# Patient Record
Sex: MALE | Race: WHITE | NOT HISPANIC OR LATINO | Employment: OTHER | URBAN - METROPOLITAN AREA
[De-identification: names, ages, dates, MRNs, and addresses within clinical notes are randomized per-mention and may not be internally consistent; named-entity substitution may affect disease eponyms.]

---

## 2017-08-25 ENCOUNTER — OFFICE VISIT (OUTPATIENT)
Dept: INTERNAL MEDICINE | Facility: CLINIC | Age: 57
End: 2017-08-25
Payer: COMMERCIAL

## 2017-08-25 VITALS
DIASTOLIC BLOOD PRESSURE: 70 MMHG | WEIGHT: 188.94 LBS | BODY MASS INDEX: 25.04 KG/M2 | HEART RATE: 69 BPM | SYSTOLIC BLOOD PRESSURE: 128 MMHG | HEIGHT: 73 IN

## 2017-08-25 DIAGNOSIS — K40.90 INGUINAL HERNIA OF RIGHT SIDE WITHOUT OBSTRUCTION OR GANGRENE: Primary | ICD-10-CM

## 2017-08-25 PROCEDURE — 3008F BODY MASS INDEX DOCD: CPT | Mod: S$GLB,,, | Performed by: INTERNAL MEDICINE

## 2017-08-25 PROCEDURE — 99202 OFFICE O/P NEW SF 15 MIN: CPT | Mod: S$GLB,,, | Performed by: INTERNAL MEDICINE

## 2017-08-25 PROCEDURE — 99999 PR PBB SHADOW E&M-EST. PATIENT-LVL III: CPT | Mod: PBBFAC,,, | Performed by: INTERNAL MEDICINE

## 2017-08-25 NOTE — PROGRESS NOTES
REASON FOR VISIT:  This is a 57-year-old male who has noticed a bulge in his   right suprapubic region.  It is a little bit tender, particularly when he is   moving something.  He started noticing the pain about a week ago when sneezing   and then two days ago, he started noticing the bulge; he is able to push it in.    There is no abdominal pain or back pain.  No problems with bowel or urine   function.    PAST MEDICAL HISTORY:  No major health conditions.    MEDICATIONS:  None.    PHYSICAL EXAMINATION:  VITAL SIGNS:  Weight 188 pounds, blood pressure 128/70, pulse 72.  LUNGS:  Clear.  HEART:  Regular rate and rhythm.  ABDOMEN:  Active bowel sounds, soft, nontender.  GROINS:  There is a noticeable bulge in the right suprapubic region that is   reducible.  On standing up, the bulge is again noticed and I can feel it within   the inguinal canal.  No testicular abnormalities.    IMPRESSION:  Acute right inguinal hernia.    PLAN:  Refer to General Surgery.  Proper fluid intake discussed.    /sc [id number] review      JAM/ALEISHA  dd: 08/25/2017 11:40:43 (CDT)  td: 08/26/2017 01:26:53 (CDT)  Doc ID   #[id number]  Job ID #[id number]    CC:

## 2017-08-28 DIAGNOSIS — Z12.11 COLON CANCER SCREENING: ICD-10-CM

## 2017-08-29 ENCOUNTER — OFFICE VISIT (OUTPATIENT)
Dept: SURGERY | Facility: CLINIC | Age: 57
End: 2017-08-29
Payer: COMMERCIAL

## 2017-08-29 ENCOUNTER — LAB VISIT (OUTPATIENT)
Dept: LAB | Facility: HOSPITAL | Age: 57
End: 2017-08-29
Attending: SURGERY
Payer: COMMERCIAL

## 2017-08-29 ENCOUNTER — HOSPITAL ENCOUNTER (OUTPATIENT)
Dept: CARDIOLOGY | Facility: CLINIC | Age: 57
Discharge: HOME | End: 2017-08-29
Attending: SURGERY
Payer: COMMERCIAL

## 2017-08-29 VITALS
HEIGHT: 73 IN | WEIGHT: 188 LBS | BODY MASS INDEX: 24.92 KG/M2 | DIASTOLIC BLOOD PRESSURE: 78 MMHG | TEMPERATURE: 98 F | HEART RATE: 61 BPM | SYSTOLIC BLOOD PRESSURE: 126 MMHG

## 2017-08-29 DIAGNOSIS — K40.90 RIGHT INGUINAL HERNIA: ICD-10-CM

## 2017-08-29 DIAGNOSIS — K40.90 RIGHT INGUINAL HERNIA: Primary | ICD-10-CM

## 2017-08-29 LAB
ANION GAP SERPL CALC-SCNC: 5 MMOL/L
BASOPHILS # BLD AUTO: 0.03 K/UL
BASOPHILS NFR BLD: 0.5 %
BUN SERPL-MCNC: 18 MG/DL
CALCIUM SERPL-MCNC: 9.6 MG/DL
CHLORIDE SERPL-SCNC: 102 MMOL/L
CO2 SERPL-SCNC: 30 MMOL/L
CREAT SERPL-MCNC: 0.9 MG/DL
DIFFERENTIAL METHOD: NORMAL
EOSINOPHIL # BLD AUTO: 0.1 K/UL
EOSINOPHIL NFR BLD: 1.7 %
ERYTHROCYTE [DISTWIDTH] IN BLOOD BY AUTOMATED COUNT: 13.2 %
EST. GFR  (AFRICAN AMERICAN): >60 ML/MIN/1.73 M^2
EST. GFR  (NON AFRICAN AMERICAN): >60 ML/MIN/1.73 M^2
GLUCOSE SERPL-MCNC: 98 MG/DL
HCT VFR BLD AUTO: 46.8 %
HGB BLD-MCNC: 16 G/DL
LYMPHOCYTES # BLD AUTO: 2.1 K/UL
LYMPHOCYTES NFR BLD: 31.4 %
MCH RBC QN AUTO: 30.3 PG
MCHC RBC AUTO-ENTMCNC: 34.2 G/DL
MCV RBC AUTO: 89 FL
MONOCYTES # BLD AUTO: 0.4 K/UL
MONOCYTES NFR BLD: 6.7 %
NEUTROPHILS # BLD AUTO: 3.9 K/UL
NEUTROPHILS NFR BLD: 59.4 %
PLATELET # BLD AUTO: 206 K/UL
PMV BLD AUTO: 9.3 FL
POTASSIUM SERPL-SCNC: 4.6 MMOL/L
RBC # BLD AUTO: 5.28 M/UL
SODIUM SERPL-SCNC: 137 MMOL/L
WBC # BLD AUTO: 6.56 K/UL

## 2017-08-29 PROCEDURE — 80048 BASIC METABOLIC PNL TOTAL CA: CPT

## 2017-08-29 PROCEDURE — 99999 PR PBB SHADOW E&M-EST. PATIENT-LVL IV: CPT | Mod: PBBFAC,,, | Performed by: SURGERY

## 2017-08-29 PROCEDURE — 36415 COLL VENOUS BLD VENIPUNCTURE: CPT

## 2017-08-29 PROCEDURE — 93000 ELECTROCARDIOGRAM COMPLETE: CPT | Mod: S$GLB,,, | Performed by: INTERNAL MEDICINE

## 2017-08-29 PROCEDURE — 85025 COMPLETE CBC W/AUTO DIFF WBC: CPT

## 2017-08-29 PROCEDURE — 99243 OFF/OP CNSLTJ NEW/EST LOW 30: CPT | Mod: S$GLB,,, | Performed by: SURGERY

## 2017-08-29 RX ORDER — SODIUM CHLORIDE 9 MG/ML
INJECTION, SOLUTION INTRAVENOUS CONTINUOUS
Status: CANCELLED | OUTPATIENT
Start: 2017-08-29

## 2017-08-29 NOTE — PROGRESS NOTES
"History & Physical    SUBJECTIVE:     History of Present Illness:  Patient is a 57 y.o. male referred to the clinic for a bulge in his supra pubic region. He states that one week ago he has coughing and noticed some discomfort and pain in his groin/ suprapubic region radiating up into his abdomen. Two days later he was lifting weights in the gym and he noticed a bulge in the suprapubic area. He was able to reduce the bulge. He has relieved the discomfort and mass some by wearing compression shorts. He is a very active man who enjoys heavy lifting weights and frequent cardio and the discomfort and mass is very bothersome to him.       Chief Complaint   Patient presents with    Consult       Review of patient's allergies indicates:   Allergen Reactions    Peanut        No current outpatient prescriptions on file.     No current facility-administered medications for this visit.        No past medical history on file.  Past Surgical History:   Procedure Laterality Date    EYE SURGERY       Family History   Problem Relation Age of Onset    Heart disease Mother     Stroke Mother      Social History   Substance Use Topics    Smoking status: Never Smoker    Smokeless tobacco: Not on file    Alcohol use 0.0 oz/week      Comment: limited        Review of Systems:  Review of Systems   Respiratory: Negative for cough, chest tightness and shortness of breath.    Cardiovascular: Negative for chest pain.   Gastrointestinal: Negative for abdominal pain, nausea and vomiting.   Genitourinary: Negative for testicular pain.       OBJECTIVE:     Vital Signs (Most Recent)  Temp: 97.6 °F (36.4 °C) (08/29/17 0943)  Pulse: 61 (08/29/17 0943)  BP: 126/78 (08/29/17 0943)  6' 1" (1.854 m)  85.3 kg (188 lb)     Physical Exam:  Physical Exam   Constitutional: He appears well-developed and well-nourished. No distress.   HENT:   Head: Normocephalic and atraumatic.   Eyes: EOM are normal. Pupils are equal, round, and reactive to light. "   Cardiovascular: Normal rate and regular rhythm.    Pulmonary/Chest: Breath sounds normal.   Abdominal: Soft. Normal appearance and bowel sounds are normal. There is no tenderness. A hernia is present. Hernia confirmed positive in the right inguinal area. Hernia confirmed negative in the left inguinal area.   Skin: He is not diaphoretic.   Nursing note and vitals reviewed.        ASSESSMENT/PLAN:     Patient is a very pleasant and healthy 57 year old male who presents with a reducible right inguinal hernia    Plan for an open right inguinal hernia repair with mesh this Friday  The hernia and surgical repair has been discussed with him in extensive detail including risk, benefits, alternative treatments and he is interested in going through with the procedure as soon as possible. . Pre procedure instructions were provided.   Consent obtained    Subhash Ivy MD PGY III  [phone]      ATTENDING ATTESTATION: Patient seen and examined. My examination confirms the resident's findings and I agree with his assessment and plan above.

## 2017-08-29 NOTE — PATIENT INSTRUCTIONS
What Is a Hernia?    A hernia is when an organ or tissue pushes through a weak area in the belly (abdominal) wall. This weak area may be present at birth. Or it may be caused by abdominal strain over time. If not treated, a hernia can get worse with time and physical stress.  When a bulge forms  When there is a weak area in the abdominal wall, an organ or tissue can push outward. This often causes a bulge that you can see under your skin. The bulge may get bigger when you stand up. It may go away when you lie down. You may also feel some pressure or mild pain when lifting, coughing, urinating, or doing other activities.  Types of hernias  The type of hernia you have depends on its location. Most hernias form in the groin at or near the internal ring. This is the entrance to a canal between the abdomen and groin. Hernias can also occur in the abdomen, thigh, or genitals.  · An incisional hernia occurs at the site of a previous surgical incision.  · An umbilical hernia occurs at the navel.  · An indirect inguinal hernia occurs in the groin at the internal ring.  · A direct inguinal hernia occurs in the groin near the internal ring.  · A femoral hernia occurs just below the groin.  · An epigastric hernia occurs in the upper abdomen at the midline.  Diagnosis  In most cases, your healthcare provider can diagnose a hernia by doing a physical exam.  In some cases it might not be clear why you have a swelling in the belly wall. Then your provider may order an imaging test such as an ultrasound. This can help with the diagnosis.  Surgery  A hernia will not heal on its own. Surgery is needed to fix the weak spot in the abdominal wall. If not treated, a hernia can get larger. It can also cause serious health problems. The good news is that hernia surgery can be done quickly and safely. In some cases, you can go home the same day as your surgery.   When to call your provider  Call your healthcare provider right away if the  swelling around your hernia becomes larger, firmer, or more painful. These may be signs that your intestines are stuck in the abdominal wall. This is an emergency. The hernia must be repaired right away to avoid serious problems.  Date Last Reviewed: 7/1/2016 © 2000-2016 [employer]. [address] [place] [address]. All rights reserved. This information is not intended as a substitute for professional medical care. Always follow your healthcare professional's instructions.        Having Hernia Surgery: Patch Repair  Surgery treats a hernia by repairing the weakness in the abdominal wall. An incision is made so the surgeon has a direct view of the hernia. The repair is then done through this incision (open surgery). To repair the defect, special mesh materials are used to patch the weak area and make a tension-free repair. Follow your healthcare providers advice on how to get ready for the procedure. You can usually go home the same day as your surgery. In some cases, though, you may need to stay in the hospital overnight.  Getting ready for surgery  Your healthcare provider will talk with you about preparing for surgery. Follow all the instructions youre given and be sure to:   · Tell your healthcare provider about any medicines, supplements, or herbs you take. This includes both prescription and over-the-counter items.  · Stop taking aspirin, ibuprofen, naproxen and other NSAIDs as directed.  · Arrange for an adult family member or friend to give you a ride home after surgery.  · Stop smoking. Smoking affects blood flow and can slow healing.  · Gently wash the surgical area the night before surgery.  · Dont eat or drink after midnight, the night before your surgery. You may need to take some medicine with sips of water on the day of surgery. Talk with your healthcare provider.     Repair in Front           Repair in Back           Combination Repair      The day of surgery  Arrive at  the hospital or surgical center at your scheduled time. Youll be asked to change into a patient gown. Youll then be given an IV to provide fluids and medicine. Shortly before surgery, an anesthesiologist or nurse anesthetist will talk with you. He or she will explain the types of anesthesia used to prevent pain during surgery. You will have one or more of the following:  · Monitored sedation to make you relaxed and sleepy.  · Local anesthesia to numb the surgical site.  · Regional anesthesia to numb specific areas of your body.  · General anesthesia to let you sleep during surgery.  During the surgery  Most hernias are treated using tension-free repairs. This is surgery that uses special mesh materials to repair the weak area. Unlike traditional repairs, the abdominal fascia (tissue around the muscle that gives strength to the abdominal wall) isnt sewn together. Instead, the mesh covers the weak area like a patch. This repairs the defect without tension on the muscles. It also makes it less likely to happen again. The mesh is made of strong, flexible plastic that stays in the body. Over time, nearby tissues grow into the mesh to strengthen the repair.  After surgery  When the procedure is over, youll be taken to the recovery area to rest. Your blood pressure and heart rate will be monitored. Youll also have a bandage over the surgical site. To help reduce discomfort, youll be given pain medicines. You may also be given breathing exercises to keep your lungs clear. Later, youll be asked to get up and walk. This helps prevent blood clots in the legs. You can go home when your healthcare provider says youre ready.     Risks and complications  Hernia surgery is safe, but does have risks including:  · Bleeding  · Infection  · Anesthesia risks  · Mesh complications  · Inability to urinate   · Bowel or bladder injury   · Numbness or pain in the groin or leg  · Risk the hernia will happen again  · Damage to the  testicles or testicular function      Date Last Reviewed: 10/1/2016  © 2833-0933 [employer]. [address] [place] [address], [place] [address]. All rights reserved. This information is not intended as a substitute for professional medical care. Always follow your healthcare professional's instructions.        After Hernia Surgery    You can usually go home the same day as surgery. If you had surgery to repair ventral or incisional hernia, you may need to stay in the hospital overnight. To speed healing, take an active role in your recovery. The tips below can help.  Reducing swelling  Early on, its common for the area around your incision to be swollen, bruised, and sore. To reduce swelling, put an ice pack or bag of frozen peas in a thin towel. Place the towel on the swollen area 3 to 5 times a day for 15 to 20 minutes at a time.  Managing pain  Take any prescribed pain medicines as directed. Be aware that some pain medicines can cause constipation. So your healthcare provider may also suggest a laxative or stool softener.  Returning to normal  You can return to your normal routine as soon as you feel able. Just take it easy and follow these guidelines:  · Take short walks to improve circulation.  · Avoid heavy lifting for at least a week.  · Ask your healthcare provider when you can drive and return to work.  · You can have sex again when you feel ready.  Follow-up care  Be sure to keep all follow-up appointments with your healthcare provider. These ensure youre healing well. During visits, your stitches, staples, or bandage may be removed.  When to call your healthcare provider   Call your healthcare provider if you have any of the following:  · A large amount of swelling or bruising (some testicular swelling and bruising is normal)  · Fever of 100.4°F (38°C) or higher, or as directed by your healthcare provider  · Pain, redness, bleeding, or fluid from the incision that gets worse  · Trouble  urinating  · Constipation  · Vomiting   Date Last Reviewed: 10/1/2016  © 5554-3424 [employer]. [address] [place] [address], [place] [address]. All rights reserved. This information is not intended as a substitute for professional medical care. Always follow your healthcare professional's instructions.

## 2017-08-29 NOTE — LETTER
August 29, 2017      Saturnino Hernandez MD  [address]  [place] [address]           [facility] - General Surgery  [address]  [facility] [address]  Phone: [phone]          Patient: Jovi Pacheco   MR Number: [id number]   YOB: 1960   Date of Visit: 8/29/2017       Dear Dr. Saturnino Hernandez:    Thank you for referring Jovi Pacheco to me for evaluation. Attached you will find relevant portions of my assessment and plan of care.    If you have questions, please do not hesitate to call me. I look forward to following Jovi Pacheco along with you.    Sincerely,    Joshua Goldberg, MD    Enclosure  CC:  No Recipients    If you would like to receive this communication electronically, please contact [email or web address] or [phone] to request more information on [employer] Link access.    For providers and/or their staff who would like to refer a patient to [facility], please contact us through our one-stop-shop provider referral line, [facility], at [phone].    If you feel you have received this communication in error or would no longer like to receive these types of communications, please e-mail [email or web address]

## 2017-08-31 ENCOUNTER — TELEPHONE (OUTPATIENT)
Dept: SURGERY | Facility: CLINIC | Age: 57
End: 2017-08-31

## 2017-09-01 ENCOUNTER — ANESTHESIA EVENT (OUTPATIENT)
Dept: SURGERY | Facility: HOSPITAL | Age: 57
End: 2017-09-01
Payer: COMMERCIAL

## 2017-09-01 ENCOUNTER — ANESTHESIA (OUTPATIENT)
Dept: SURGERY | Facility: HOSPITAL | Age: 57
End: 2017-09-01
Payer: COMMERCIAL

## 2017-09-01 ENCOUNTER — HOSPITAL ENCOUNTER (OUTPATIENT)
Facility: HOSPITAL | Age: 57
Discharge: HOME | End: 2017-09-01
Attending: SURGERY | Admitting: SURGERY
Payer: COMMERCIAL

## 2017-09-01 ENCOUNTER — SURGERY (OUTPATIENT)
Age: 57
End: 2017-09-01

## 2017-09-01 VITALS
HEIGHT: 73 IN | HEART RATE: 49 BPM | RESPIRATION RATE: 16 BRPM | TEMPERATURE: 97 F | DIASTOLIC BLOOD PRESSURE: 91 MMHG | OXYGEN SATURATION: 100 % | SYSTOLIC BLOOD PRESSURE: 135 MMHG | WEIGHT: 188 LBS | BODY MASS INDEX: 24.92 KG/M2

## 2017-09-01 DIAGNOSIS — Z98.890 S/P INGUINAL HERNIA REPAIR: Primary | ICD-10-CM

## 2017-09-01 DIAGNOSIS — K40.90 RIGHT INGUINAL HERNIA: ICD-10-CM

## 2017-09-01 DIAGNOSIS — Z87.19 S/P INGUINAL HERNIA REPAIR: Primary | ICD-10-CM

## 2017-09-01 PROCEDURE — 49505 PRP I/HERN INIT REDUC >5 YR: CPT | Mod: RT,,, | Performed by: SURGERY

## 2017-09-01 PROCEDURE — S0020 INJECTION, BUPIVICAINE HYDRO: HCPCS | Performed by: SURGERY

## 2017-09-01 PROCEDURE — 37000008 HC ANESTHESIA 1ST 15 MINUTES: Performed by: SURGERY

## 2017-09-01 PROCEDURE — 37000009 HC ANESTHESIA EA ADD 15 MINS: Performed by: SURGERY

## 2017-09-01 PROCEDURE — 71000015 HC POSTOP RECOV 1ST HR: Performed by: SURGERY

## 2017-09-01 PROCEDURE — 71000044 HC DOSC ROUTINE RECOVERY FIRST HOUR: Performed by: SURGERY

## 2017-09-01 PROCEDURE — 63600175 PHARM REV CODE 636 W HCPCS: Performed by: SURGERY

## 2017-09-01 PROCEDURE — D9220A PRA ANESTHESIA: Mod: CRNA,,, | Performed by: NURSE ANESTHETIST, CERTIFIED REGISTERED

## 2017-09-01 PROCEDURE — 71000016 HC POSTOP RECOV ADDL HR: Performed by: SURGERY

## 2017-09-01 PROCEDURE — 25000003 PHARM REV CODE 250: Performed by: SURGERY

## 2017-09-01 PROCEDURE — 36000706: Performed by: SURGERY

## 2017-09-01 PROCEDURE — 63600175 PHARM REV CODE 636 W HCPCS: Performed by: NURSE ANESTHETIST, CERTIFIED REGISTERED

## 2017-09-01 PROCEDURE — D9220A PRA ANESTHESIA: Mod: ANES,,, | Performed by: ANESTHESIOLOGY

## 2017-09-01 PROCEDURE — C1781 MESH (IMPLANTABLE): HCPCS | Performed by: SURGERY

## 2017-09-01 PROCEDURE — 36000707: Performed by: SURGERY

## 2017-09-01 DEVICE — MESH POLY KNIT PERFIX PLG LG+: Type: IMPLANTABLE DEVICE | Site: INGUINAL | Status: FUNCTIONAL

## 2017-09-01 RX ORDER — DIPHENHYDRAMINE HYDROCHLORIDE 50 MG/ML
25 INJECTION INTRAMUSCULAR; INTRAVENOUS EVERY 6 HOURS PRN
Status: DISCONTINUED | OUTPATIENT
Start: 2017-09-01 | End: 2017-09-01 | Stop reason: HOSPADM

## 2017-09-01 RX ORDER — LIDOCAINE HYDROCHLORIDE 10 MG/ML
1 INJECTION, SOLUTION EPIDURAL; INFILTRATION; INTRACAUDAL; PERINEURAL ONCE
Status: COMPLETED | OUTPATIENT
Start: 2017-09-01 | End: 2017-09-01

## 2017-09-01 RX ORDER — FENTANYL CITRATE 50 UG/ML
INJECTION, SOLUTION INTRAMUSCULAR; INTRAVENOUS
Status: DISCONTINUED | OUTPATIENT
Start: 2017-09-01 | End: 2017-09-01

## 2017-09-01 RX ORDER — LIDOCAINE HCL/PF 100 MG/5ML
SYRINGE (ML) INTRAVENOUS
Status: DISCONTINUED | OUTPATIENT
Start: 2017-09-01 | End: 2017-09-01

## 2017-09-01 RX ORDER — PROPOFOL 10 MG/ML
VIAL (ML) INTRAVENOUS CONTINUOUS PRN
Status: DISCONTINUED | OUTPATIENT
Start: 2017-09-01 | End: 2017-09-01

## 2017-09-01 RX ORDER — PROPOFOL 10 MG/ML
VIAL (ML) INTRAVENOUS
Status: DISCONTINUED | OUTPATIENT
Start: 2017-09-01 | End: 2017-09-01

## 2017-09-01 RX ORDER — BUPIVACAINE HYDROCHLORIDE 5 MG/ML
INJECTION, SOLUTION EPIDURAL; INTRACAUDAL
Status: DISCONTINUED | OUTPATIENT
Start: 2017-09-01 | End: 2017-09-01 | Stop reason: HOSPADM

## 2017-09-01 RX ORDER — MIDAZOLAM HYDROCHLORIDE 1 MG/ML
INJECTION, SOLUTION INTRAMUSCULAR; INTRAVENOUS
Status: DISCONTINUED | OUTPATIENT
Start: 2017-09-01 | End: 2017-09-01

## 2017-09-01 RX ORDER — SODIUM CHLORIDE 9 MG/ML
INJECTION, SOLUTION INTRAVENOUS CONTINUOUS
Status: DISCONTINUED | OUTPATIENT
Start: 2017-09-01 | End: 2017-09-01 | Stop reason: HOSPADM

## 2017-09-01 RX ORDER — HYDROMORPHONE HYDROCHLORIDE 1 MG/ML
0.2 INJECTION, SOLUTION INTRAMUSCULAR; INTRAVENOUS; SUBCUTANEOUS EVERY 5 MIN PRN
Status: DISCONTINUED | OUTPATIENT
Start: 2017-09-01 | End: 2017-09-01 | Stop reason: HOSPADM

## 2017-09-01 RX ORDER — OXYCODONE AND ACETAMINOPHEN 5; 325 MG/1; MG/1
1 TABLET ORAL EVERY 4 HOURS PRN
Qty: 31 TABLET | Refills: 0 | Status: SHIPPED | OUTPATIENT
Start: 2017-09-01 | End: 2017-09-14

## 2017-09-01 RX ORDER — LIDOCAINE HYDROCHLORIDE AND EPINEPHRINE 10; 10 MG/ML; UG/ML
INJECTION, SOLUTION INFILTRATION; PERINEURAL
Status: DISCONTINUED | OUTPATIENT
Start: 2017-09-01 | End: 2017-09-01 | Stop reason: HOSPADM

## 2017-09-01 RX ADMIN — SODIUM CHLORIDE: 0.9 INJECTION, SOLUTION INTRAVENOUS at 01:09

## 2017-09-01 RX ADMIN — BUPIVACAINE HYDROCHLORIDE 10 ML: 5 INJECTION, SOLUTION EPIDURAL; INTRACAUDAL; PERINEURAL at 12:09

## 2017-09-01 RX ADMIN — FENTANYL CITRATE 50 MCG: 50 INJECTION, SOLUTION INTRAMUSCULAR; INTRAVENOUS at 01:09

## 2017-09-01 RX ADMIN — PROPOFOL 20 MG: 10 INJECTION, EMULSION INTRAVENOUS at 11:09

## 2017-09-01 RX ADMIN — PROPOFOL 20 MG: 10 INJECTION, EMULSION INTRAVENOUS at 12:09

## 2017-09-01 RX ADMIN — LIDOCAINE HYDROCHLORIDE 50 MG: 20 INJECTION, SOLUTION INTRAVENOUS at 11:09

## 2017-09-01 RX ADMIN — SODIUM CHLORIDE: 0.9 INJECTION, SOLUTION INTRAVENOUS at 11:09

## 2017-09-01 RX ADMIN — Medication 2 G: at 11:09

## 2017-09-01 RX ADMIN — FENTANYL CITRATE 50 MCG: 50 INJECTION, SOLUTION INTRAMUSCULAR; INTRAVENOUS at 11:09

## 2017-09-01 RX ADMIN — LIDOCAINE HYDROCHLORIDE AND EPINEPHRINE 5 ML: 10; 10 INJECTION, SOLUTION INFILTRATION; PERINEURAL at 12:09

## 2017-09-01 RX ADMIN — LIDOCAINE HYDROCHLORIDE 1 MG: 10 INJECTION, SOLUTION EPIDURAL; INFILTRATION; INTRACAUDAL; PERINEURAL at 11:09

## 2017-09-01 RX ADMIN — PROPOFOL 100 MCG/KG/MIN: 10 INJECTION, EMULSION INTRAVENOUS at 11:09

## 2017-09-01 RX ADMIN — MIDAZOLAM HYDROCHLORIDE 2 MG: 1 INJECTION, SOLUTION INTRAMUSCULAR; INTRAVENOUS at 11:09

## 2017-09-01 NOTE — H&P (VIEW-ONLY)
"History & Physical    SUBJECTIVE:     History of Present Illness:  Patient is a 57 y.o. male referred to the clinic for a bulge in his supra pubic region. He states that one week ago he has coughing and noticed some discomfort and pain in his groin/ suprapubic region radiating up into his abdomen. Two days later he was lifting weights in the gym and he noticed a bulge in the suprapubic area. He was able to reduce the bulge. He has relieved the discomfort and mass some by wearing compression shorts. He is a very active man who enjoys heavy lifting weights and frequent cardio and the discomfort and mass is very bothersome to him.       Chief Complaint   Patient presents with    Consult       Review of patient's allergies indicates:   Allergen Reactions    Peanut        No current outpatient prescriptions on file.     No current facility-administered medications for this visit.        No past medical history on file.  Past Surgical History:   Procedure Laterality Date    EYE SURGERY       Family History   Problem Relation Age of Onset    Heart disease Mother     Stroke Mother      Social History   Substance Use Topics    Smoking status: Never Smoker    Smokeless tobacco: Not on file    Alcohol use 0.0 oz/week      Comment: limited        Review of Systems:  Review of Systems   Respiratory: Negative for cough, chest tightness and shortness of breath.    Cardiovascular: Negative for chest pain.   Gastrointestinal: Negative for abdominal pain, nausea and vomiting.   Genitourinary: Negative for testicular pain.       OBJECTIVE:     Vital Signs (Most Recent)  Temp: 97.6 °F (36.4 °C) (08/29/17 0943)  Pulse: 61 (08/29/17 0943)  BP: 126/78 (08/29/17 0943)  6' 1" (1.854 m)  85.3 kg (188 lb)     Physical Exam:  Physical Exam   Constitutional: He appears well-developed and well-nourished. No distress.   HENT:   Head: Normocephalic and atraumatic.   Eyes: EOM are normal. Pupils are equal, round, and reactive to light. "   Cardiovascular: Normal rate and regular rhythm.    Pulmonary/Chest: Breath sounds normal.   Abdominal: Soft. Normal appearance and bowel sounds are normal. There is no tenderness. A hernia is present. Hernia confirmed positive in the right inguinal area. Hernia confirmed negative in the left inguinal area.   Skin: He is not diaphoretic.   Nursing note and vitals reviewed.        ASSESSMENT/PLAN:     Patient is a very pleasant and healthy 57 year old male who presents with a reducible right inguinal hernia    Plan for an open right inguinal hernia repair with mesh this Friday  The hernia and surgical repair has been discussed with him in extensive detail including risk, benefits, alternative treatments and he is interested in going through with the procedure as soon as possible. . Pre procedure instructions were provided.   Consent obtained    Subhash Ivy MD PGY III  [phone]      ATTENDING ATTESTATION: Patient seen and examined. My examination confirms the resident's findings and I agree with his assessment and plan above.

## 2017-09-01 NOTE — OP NOTE
DATE OF PROCEDURE: 09/01/2017     PREOPERATIVE DIAGNOSIS: Right inguinal hernia.     POSTOPERATIVE DIAGNOSIS: Right pantalloon inguinal hernia.     PROCEDURE PERFORMED: Right inguinal hernia repair with mesh.     ATTENDING SURGEON: Joshua Goldberg, M.D.     HOUSESTAFF SURGEON: No appropriate level resident was available. Barry Langley, MS4, assisted    ANESTHESIA: Monitored anesthesia care plus local.     ESTIMATED BLOOD LOSS: 5 mL.     FINDINGS: Modeate-sized indirect and small direct inguinal hernia repaired with polypropylene plug-and-patch mesh.    SPECIMEN: None     DRAINS: None.     COMPLICATIONS: None.     INDICATIONS: Jovi Pacheco is a 57 y.o.male referred to my General Surgery Clinic with a history of a symptomatic, reducible inguinal bulge. The history and exam were consistent with inguinal hernia. We recommended an open inguinal hernia repair with mesh and the patient agreed to proceed. The patient signed informed consent and expressed understanding of the risks and benefits of surgery.     DESCRIPTION OF OPERATIVE PROCEDURE: The patient was identified in preoperative holding and brought back to the Operating Room. The patient was placed supine on the operating table and padded appropriately. Monitors were applied and monitored anesthesia care was initiated. His right groin was shaved with electric clippers and prepped and draped in the standard sterile surgical fashion. A timeout was performed and all team members present agreed this was the correct procedure on the correct side of the correct patient. We also confirmed administration of appropriate preoperative antibiotics.     We marked out an appropriate right inguinal incision and administered a mix of lidocaine and Marcaine. After assuring adequate local anesthesia, a 5 cm oblique skin incision was made. Subcutaneous tissue was divided with Bovie electrocautery. This dissection was carried down through Didier fascia down to the aponeurosis  of the external oblique. The aponeurosis of the external oblique was incised in line with its fibers, first with a scalpel and then Metzenbaum scissors, and this incision was extended to the external spermatic ring. The inguinal canal contents were bluntly encircled, first digitally and then with a Penrose drain. Weitlaner retractors were placed to facilitate the dissection. We proceeded to identify a cord lipoma and an indirect inguinal hernia sac, which were carefully dissected away from the inguinal canal contents until they could be reduced into the defect. We performed skeletonization of the spermatic cord and also identified a smaller direct hernia. We then had appropriate borders of the inguinal canal identified and decided to repair the defect with a plug and patch polypropylene mesh. The plug was inserted into the indirect defect and secured with two 2-0 Prolene sutures. The patch was then sewn in place with interrupted 2-0 Prolene suture. Medially, the mesh was anchored to the fascia overlying the pubic tubercle. Inferiorly, the mesh was anchored to the shelving edge of the inguinal ligament. Superiorly, the mesh was anchored to the conjoined tendon. The tails of the mesh were brought together around the cord structures creating a new internal ring that just admitted the tip of the finger. The mesh was inspected and noted to lie in appropriate position without any redundancy or tension. The testicle was pulled back down to the scrotum and there was noted to be no tension on the cord structures. The aponeurosis of the external oblique was closed with a running 3-0 Vicryl suture. Didier fascia was closed with several buried interrupted 3-0 Vicryl sutures and the skin was closed with a running subcuticular Monocryl suture. A sterile dressing was applied. The patient was then transported to the Recovery Room in stable condition. All sponge, instrument and needle counts were correct at the end of the case. I  was present for and performed the entire procedure.

## 2017-09-01 NOTE — TRANSFER OF CARE
"Anesthesia Transfer of Care Note    Patient: Jovi Pacheco    Procedure(s) Performed: Procedure(s) (LRB):  REPAIR-HERNIA-INGUINAL-INITIAL (5 YRS+) Open with Mesh (Right)    Patient location: [facility]    Anesthesia Type: MAC    Transport from OR: Transported from OR on 6-10 L/min O2 by face mask with adequate spontaneous ventilation    Post pain: adequate analgesia    Post assessment: no apparent anesthetic complications and tolerated procedure well    Post vital signs: stable    Level of consciousness: awake, alert and oriented    Nausea/Vomiting: no nausea/vomiting    Complications: none    Transfer of care protocol was followed      Last vitals:   Visit Vitals  /87 (BP Location: Right arm, Patient Position: Lying)   Pulse (!) 58   Temp 36.7 °C (98 °F) (Oral)   Resp 18   Ht 6' 1" (1.854 m)   Wt 85.3 kg (188 lb)   SpO2 99%   BMI 24.80 kg/m²     "

## 2017-09-01 NOTE — DISCHARGE INSTRUCTIONS
Discharge Instructions for Open Hernia Repair  You had a procedure called open hernia repair. Also called a rupture, a hernia is a tear or weakness in the wall of the belly. This weakness may be present at birth. Or it can be caused by the wear and tear of daily living. Hernias may get worse with time or with physical stress. But surgery can help repair the weakness and eliminate symptoms.  Activity after surgery  Recommendations include the following:  · After surgery, take it easy for the rest of the day. If you had general anesthesia, dont use machinery or power tools, drink alcohol, or make any major decisions for at least the first 24 hours.  · Dont drive while you are still taking opioid pain medicine, and dont drive until you are able to step firmly on the brake pedal without hesitation.  · Ask others to help with chores and errands while you recover.  · Dont lift anything heavier than 10 pounds until your healthcare provider says its OK.  · Dont mow the lawn, use a vacuum , or do other strenuous activities until your healthcare provider says its OK.  · Walk as often as you feel able.  · Continue the coughing and deep breathing exercises that you learned in the hospital.  · Ask your healthcare provider when you can expect to return to work.  · Avoid constipation:  ¨ Eat fruits, vegetables, and whole grains.  ¨ Drink 6 to 8 glasses of water a day, unless otherwise instructed.  ¨ Use a laxative or a mild stool softener as instructed by your healthcare provider.  Bandage and incision care  Tips include the following:  · Do not get the bandage or wound wet for 48 hours.  · If strips of tape were used to close your incision, dont pull them off. Let them fall off on their own.  · Remove any gauze bandage in 48 hours.  · Wash your incision with mild soap and water. Pat it dry. Dont use oils, powders, or lotions on your incision. Do not soak your incision or take tub baths until cleared by your  healthcare provider.  Follow-up care  Keep follow-up appointments during your recovery. These allow your healthcare provider to check your progress and make sure youre healing well. You may also need to have your stitches, staples, or bandage removed. During office visits, tell your healthcare provider if you have any new symptoms. And be sure to ask any questions you have.     When to call your healthcare provider  Call your healthcare provider immediately if you have any of the following:  · A large amount of swelling or bruising (some testicular swelling and bruising is common)  · Bleeding  · Increasing pain  · Increased redness or drainage of the incision  · Fever of 100.4°F (38.0°C) or higher, or as directed by your healthcare provider  · Trouble urinating  · Nausea or vomiting   Date Last Reviewed: 7/1/2016  © 5048-2168 [employer] [address] [place] [address], [place] [address]. All rights reserved. This information is not intended as a substitute for professional medical care. Always follow your healthcare professional's instructions.

## 2017-09-01 NOTE — ANESTHESIA PREPROCEDURE EVALUATION
09/01/2017  Jovi Pacheco is a 57 y.o., male.    Anesthesia Evaluation         Review of Systems  Anesthesia Hx:  No problems with previous Anesthesia   Social:  Non-Smoker    Cardiovascular:   Exercise tolerance: good Denies Hypertension.  Denies CAD.     Denies Angina.  Functional Capacity Can you climb two flights of stairs? ==> Yes    Pulmonary:   Denies Asthma.  Denies Recent URI.  Denies Sleep Apnea.    Renal/:  Renal/ Normal     Hepatic/GI:   Denies PUD. Denies Hiatal Hernia.  Denies GERD. Denies Liver Disease.  Denies Hepatitis.    Neurological:   Denies CVA. Denies Seizures.    Endocrine:   Denies Diabetes. Denies Hypothyroidism.        Physical Exam  General:  Well nourished    Airway/Jaw/Neck:  Airway Findings: Mouth Opening: Normal Tongue: Normal  General Airway Assessment: Adult  Mallampati: I  TM Distance: Normal, at least 6 cm  Jaw/Neck Findings:  Neck ROM: Normal ROM  Neck Findings:     Eyes/Ears/Nose:  EYES/EARS/NOSE FINDINGS: Normal   Dental:  Dental Findings: In tact   Chest/Lungs:  Chest/Lungs Findings: Clear to auscultation     Heart/Vascular:  Heart Findings: Rate: Normal  Rhythm: Regular Rhythm  Sounds: Normal        Mental Status:  Mental Status Findings:  Alert and Oriented         Anesthesia Plan  Type of Anesthesia, risks & benefits discussed:  Anesthesia Type:  general  Patient's Preference: Proceed with anesthesia understanding that the risks are very small but could be serious or life threatening.  Intra-op Monitoring Plan: standard ASA monitors  Intra-op Monitoring Plan Comments:   Post Op Pain Control Plan:   Post Op Pain Control Plan Comments:   Induction:   IV  Beta Blocker:  Patient is not currently on a Beta-Blocker (No further documentation required).       Informed Consent: Patient understands risks and agrees with Anesthesia plan.  Questions answered.  Anesthesia consent signed with patient.  ASA Score: 1     Day of Surgery Review of History & Physical: I have interviewed and examined the patient. I have reviewed the patient's H&P dated:            Ready For Surgery From Anesthesia Perspective.

## 2017-09-01 NOTE — PLAN OF CARE
Patient and patient's fiance received discharge instructions and prescription.  Patient and patient's fiance verbalized understanding of all instructions given and all questions were addressed prior to patient's discharge.  Patient's vital signs are stable and within patient's baseline.  Patient tolerated clear liquids PO.  Patient voided 225 mL of clear yellow urine without difficulty in post-op.  Patient denies pain.  Patient denies nausea and vomiting at this time.  Patient meets all criteria for discharge at this time.  All required consents present in patient's chart upon patient's discharge.

## 2017-09-01 NOTE — DISCHARGE SUMMARY
[facility]  Brief Operative Note     SUMMARY     Surgery Date: 9/1/2017     Surgeon(s) and Role:     * Joshua Goldberg, MD - Primary    Pre-op Diagnosis:  Right inguinal hernia [K40.90]    Post-op Diagnosis:  Post-Op Diagnosis Codes:     * Right inguinal hernia [K40.90]    Procedure(s) (LRB):  REPAIR-HERNIA-INGUINAL-INITIAL (5 YRS+) Open with Mesh (Right)    Anesthesia: Local MAC    Description of the findings of the procedure: Right inguinal hernia repair with mesh    Findings/Key Components: see above    Estimated Blood Loss: * No values recorded between 9/1/2017 12:03 PM and 9/1/2017  1:07 PM *         Specimens:   Specimen (12h ago through future)    None          Discharge Note    SUMMARY     Admit Date: 9/1/2017    Discharge Date and Time:  09/01/2017 1:08 PM    Hospital Course (synopsis of major diagnoses, care, treatment, and services provided during the course of the hospital stay): Jovi Pacheco was admitted to the hospital for surgery, specifically right inguinal hernia repair, the surgery went well and the patient was transferred to the PACU and then home in stable condition. The patient's postoperative course was uncomplicated. The patient was deemed stable and was discharged on 09/01/2017.       Final Diagnosis: Post-Op Diagnosis Codes:     * Right inguinal hernia [K40.90]    Disposition: Home or Self Care    Follow Up/Patient Instructions:     Medications:  Reconciled Home Medications:   Current Discharge Medication List      START taking these medications    Details   oxycodone-acetaminophen (PERCOCET) 5-325 mg per tablet Take 1 tablet by mouth every 4 (four) hours as needed.  Qty: 31 tablet, Refills: 0             Discharge Procedure Orders  Diet general     Shower on day dressing removed (No bath)   Scheduling Instructions: No submerging wound in water for 2 weeks, showering OK (no baths, hot tubs, pools, ocean, bayou, etc.)     Lifting restrictions   Scheduling  Instructions: No heavy lifting of anything 10 lbs or greater for six weeks.     Other restrictions (specify):   Scheduling Instructions: No driving while on narcotic medications.     Call MD for:  temperature >100.4     Call MD for:  persistent nausea and vomiting or diarrhea     Call MD for:  severe uncontrolled pain     Call MD for:  redness, tenderness, or signs of infection (pain, swelling, redness, odor or green/yellow discharge around incision site)     Call MD for:  difficulty breathing or increased cough     Call MD for:  severe persistent headache     Call MD for:  worsening rash     Call MD for:  persistent dizziness, light-headedness, or visual disturbances     Call MD for:  increased confusion or weakness     Remove dressing in 48 hours       Follow-up Information     Joshua Goldberg, MD In 2 weeks.    Specialty:  General Surgery  Why:  For wound re-check, Follow up  Contact information:  [address]  [facility] [id number] [phone]

## 2017-09-03 NOTE — ANESTHESIA POSTPROCEDURE EVALUATION
"Anesthesia Post Evaluation    Patient: Jovi Pacheco    Procedure(s) Performed: Procedure(s) (LRB):  REPAIR-HERNIA-INGUINAL-INITIAL (5 YRS+) Open with Mesh (Right)    Final Anesthesia Type: general  Patient location during evaluation: PACU  Patient participation: Yes- Able to Participate  Level of consciousness: awake and alert  Post-procedure vital signs: reviewed and stable  Pain management: adequate  Airway patency: patent  PONV status at discharge: No PONV  Anesthetic complications: no      Cardiovascular status: blood pressure returned to baseline  Respiratory status: unassisted  Hydration status: euvolemic  Follow-up not needed.        Visit Vitals  BP (!) 135/91 (BP Location: Right arm, Patient Position: Lying)   Pulse (!) 49   Temp 36.3 °C (97.3 °F) (Temporal)   Resp 16   Ht 6' 1" (1.854 m)   Wt 85.3 kg (188 lb)   SpO2 100%   BMI 24.80 kg/m²       Pain/Esperanza Score: Pain Assessment Performed: Yes (9/1/2017  3:00 PM)  Presence of Pain: denies (9/1/2017  3:00 PM)  Esperanza Score: 10 (9/1/2017  3:00 PM)      "

## 2017-09-14 ENCOUNTER — OFFICE VISIT (OUTPATIENT)
Dept: SURGERY | Facility: CLINIC | Age: 57
End: 2017-09-14
Payer: COMMERCIAL

## 2017-09-14 VITALS
HEIGHT: 73 IN | SYSTOLIC BLOOD PRESSURE: 128 MMHG | DIASTOLIC BLOOD PRESSURE: 77 MMHG | HEART RATE: 83 BPM | WEIGHT: 183 LBS | BODY MASS INDEX: 24.25 KG/M2

## 2017-09-14 DIAGNOSIS — K40.90 RIGHT INGUINAL HERNIA: Primary | ICD-10-CM

## 2017-09-14 PROCEDURE — 99024 POSTOP FOLLOW-UP VISIT: CPT | Mod: S$GLB,,, | Performed by: SURGERY

## 2017-09-14 PROCEDURE — 99999 PR PBB SHADOW E&M-EST. PATIENT-LVL III: CPT | Mod: PBBFAC,,, | Performed by: SURGERY

## 2017-09-14 NOTE — PROGRESS NOTES
SUBJECTIVE:  The patient is a 57 y.o. y/o male 2 weeks s/p mesh repair of right inguinal hernia. He states he had significant pain for the first 48 hours but now it is minimal. He denies fevers, chills, nausea, vomiting, diarrhea, or constipation. Eating well with normal appetite and bowel function. Denies redness around or drainage from incision.    OBJECTIVE:  GEN: male in NAD  ABD: soft, non-tender, non-distended  INCISION: healing well without signs of infection or hernia recurrence    ASSESSMENT/PLAN:  Doing well 2 weeks s/p mesh repair for inguinal hernia. Patient is advised to avoid heavy lifting or strenuous activity for another 2-4 weeks. Patient may bathe and continue to take a regular diet. Will follow-up with me on an as-needed basis. All questions answered; patient is comfortable with follow-up plan.

## 2017-11-14 ENCOUNTER — TELEPHONE (OUTPATIENT)
Dept: INTERNAL MEDICINE | Facility: CLINIC | Age: 57
End: 2017-11-14

## 2017-11-14 ENCOUNTER — OFFICE VISIT (OUTPATIENT)
Dept: INTERNAL MEDICINE | Facility: CLINIC | Age: 57
End: 2017-11-14
Payer: COMMERCIAL

## 2017-11-14 VITALS
SYSTOLIC BLOOD PRESSURE: 123 MMHG | HEART RATE: 64 BPM | DIASTOLIC BLOOD PRESSURE: 86 MMHG | HEIGHT: 73 IN | WEIGHT: 191.56 LBS | BODY MASS INDEX: 25.39 KG/M2

## 2017-11-14 DIAGNOSIS — I80.02 THROMBOPHLEBITIS OF SUPERFICIAL VEINS OF LEFT LOWER EXTREMITY: Primary | ICD-10-CM

## 2017-11-14 PROCEDURE — 99999 PR PBB SHADOW E&M-EST. PATIENT-LVL III: CPT | Mod: PBBFAC,,, | Performed by: INTERNAL MEDICINE

## 2017-11-14 PROCEDURE — 99214 OFFICE O/P EST MOD 30 MIN: CPT | Mod: S$GLB,,, | Performed by: INTERNAL MEDICINE

## 2017-11-14 RX ORDER — DOXYCYCLINE HYCLATE 100 MG
100 TABLET ORAL DAILY
COMMUNITY
End: 2017-11-14

## 2017-11-14 RX ORDER — INDOMETHACIN 50 MG/1
50 CAPSULE ORAL
Qty: 21 CAPSULE | Refills: 0 | Status: SHIPPED | OUTPATIENT
Start: 2017-11-14 | End: 2017-11-21

## 2017-11-14 NOTE — TELEPHONE ENCOUNTER
Called pt markel back and she states that the pt has a possible blood clot in his leg and the  Dr told him to follow up with Dr Hernandez today  Scheduled for today waiting on a call back from marylu to know what time works best for pt

## 2017-11-14 NOTE — PROGRESS NOTES
REASON FOR VISIT:  This is a 57-year-old male.  About a week ago or maybe a   little bit more, he was feeling a discomfort within his left inner thigh, more   medially.  He did notice some redness and swelling in the area and then as days   went on, it got a little bit worse and yesterday it extended down into the   proximal calf.  He went to Urgent Care at [facility] where they did a venous   Doppler.  There was no evidence of DVT.  There was prominent superficial   thrombophlebitis in the thigh, calf, and ankle region as described, which noted   the greater saphenous and lesser saphenous veins.  He has had no direct trauma   to the leg.  He had hernia surgery on September 1, and postoperatively had no   problems.  At some point, he did go back to being more active, walking and   jogging, but then eventually stopped because it was irritating his hernia area.    PAST MEDICAL HISTORY:  He has had no major health conditions.    MEDICATIONS:  Doxycycline was prescribed by the nurse practitioner yesterday.    REVIEW OF SYMPTOMS:  No shortness of breath or chest pain.  He has had no edema   involving the legs.    PHYSICAL EXAMINATION:  VITAL SIGNS:  His weight is 191, pulse 64, blood pressure 120/72.  MUSCULOSKELETAL:  He has 2+ femoral and pedal pulses.  There is a venous cord   that I can palpate in the medial aspect of the leg from the mid-thigh to the   proximal calf region.  It is tender to touch and there is surrounding hyperemia.    IMPRESSION:  Superficial thrombophlebitis.    PLAN:  I feel he can stop doxycycline, but we will start him on indomethacin 50   mg three times a day for one week.  Warm pack has been recommended over the   area.  However, if he sees no improvement with this at all, we will make an   appointment to meet with Vascular Medicine.      JAM/HN  dd: 11/14/2017 17:14:35 (CST)  td: 11/15/2017 03:24:57 (CST)  Doc ID   #[id number]  Job ID #[id number]    CC:

## 2017-11-14 NOTE — TELEPHONE ENCOUNTER
----- Message from Henna Leung sent at 11/14/2017  8:24 AM CST -----  Contact: Pt's alexis Burch  Pt called asking for advice about an earlier appointment.  Pt was seen in UC on 11/13 and was told he may have a potential blood clot in his leg.      Please call Ms. Burch at [phone].        Thanks!

## 2017-11-15 ENCOUNTER — PATIENT MESSAGE (OUTPATIENT)
Dept: INTERNAL MEDICINE | Facility: CLINIC | Age: 57
End: 2017-11-15

## 2017-11-24 ENCOUNTER — PATIENT MESSAGE (OUTPATIENT)
Dept: INTERNAL MEDICINE | Facility: CLINIC | Age: 57
End: 2017-11-24

## 2017-11-27 NOTE — TELEPHONE ENCOUNTER
Sunday evening....     The pain above my knee has mitigated dramatically in the last 24 hours.  I did a light gym work out again today and soaked in the hot tub for 15 minutes after.  I still feel it, but it is much less than before.  Believe my body is taking care of it.  I will follow up if changes for the worse.     thanks for your help....

## 2017-12-06 ENCOUNTER — OFFICE VISIT (OUTPATIENT)
Dept: OPTOMETRY | Facility: CLINIC | Age: 57
End: 2017-12-06
Payer: COMMERCIAL

## 2017-12-06 DIAGNOSIS — H43.393 VITREOUS SYNERESIS OF BOTH EYES: ICD-10-CM

## 2017-12-06 DIAGNOSIS — Z98.890 HISTORY OF PHOTOREFRACTIVE KERATECTOMY (PRK): ICD-10-CM

## 2017-12-06 DIAGNOSIS — H04.123 DRY EYE SYNDROME, BILATERAL: ICD-10-CM

## 2017-12-06 DIAGNOSIS — H25.13 NS (NUCLEAR SCLEROSIS), BILATERAL: ICD-10-CM

## 2017-12-06 DIAGNOSIS — H52.4 PRESBYOPIA: ICD-10-CM

## 2017-12-06 DIAGNOSIS — H16.101 SUPERFICIAL KERATITIS OF RIGHT EYE: Primary | ICD-10-CM

## 2017-12-06 PROCEDURE — 99999 PR PBB SHADOW E&M-EST. PATIENT-LVL II: CPT | Mod: PBBFAC,,, | Performed by: OPTOMETRIST

## 2017-12-06 PROCEDURE — 92004 COMPRE OPH EXAM NEW PT 1/>: CPT | Mod: S$GLB,,, | Performed by: OPTOMETRIST

## 2017-12-06 RX ORDER — ASPIRIN 81 MG/1
81 TABLET ORAL DAILY
COMMUNITY

## 2017-12-06 NOTE — PROGRESS NOTES
HPI     Patient's age: 57 y.o.    Approximate date of last eye examination:  years  Name of last eye doctor seen:       Pt states that it been years since last exam , think right eye noticing   more tearing and extra floaters.  Had lasik years ago for high myopia.    Wears glasses? Readers +1.75      Wears CLs?:  no           Headaches?  no  Eye pain/discomfort?  no                                                                                     Flashes?  no  Floaters?  yes  Diplopia/Double vision?  no    Patient's Ocular History:         Any eye surgeries? lasik  2012         Any eye injury?  no         Any treatment for eye disease?  no  Family history of eye disease?  no    Significant patient medical history:         1. Diabetes?  no       If yes, IDDM or NIDDM? no   2. HBP?  no                 ! OTC eyedrops currently using:  none   ! Prescription eye meds currently using:  none           Last edited by Ann-Marie Bragg MA on 12/6/2017 10:50 AM. (History)            Assessment /Plan     For exam results, see Encounter Report.                  Superficial keratitis of right eye  History of photorefractive keratectomy (PRK)  Dry eye syndrome, bilateral   Use refresh liquigel BID/ PRN    Vitreous syneresis of both eyes   No holes, tears or RD    NS (nuclear sclerosis), bilateral   Mild, monitor    Presbyopia   OK to continue with Readers

## 2018-08-30 DIAGNOSIS — Z12.11 COLON CANCER SCREENING: ICD-10-CM

## 2018-08-30 DIAGNOSIS — Z11.59 NEED FOR HEPATITIS C SCREENING TEST: ICD-10-CM

## 2018-12-17 ENCOUNTER — OFFICE VISIT (OUTPATIENT)
Dept: INTERNAL MEDICINE | Facility: CLINIC | Age: 58
End: 2018-12-17
Payer: COMMERCIAL

## 2018-12-17 ENCOUNTER — LAB VISIT (OUTPATIENT)
Dept: LAB | Facility: HOSPITAL | Age: 58
End: 2018-12-17
Attending: INTERNAL MEDICINE
Payer: COMMERCIAL

## 2018-12-17 VITALS
HEART RATE: 70 BPM | HEIGHT: 73 IN | WEIGHT: 189 LBS | OXYGEN SATURATION: 98 % | DIASTOLIC BLOOD PRESSURE: 75 MMHG | BODY MASS INDEX: 25.05 KG/M2 | SYSTOLIC BLOOD PRESSURE: 120 MMHG

## 2018-12-17 DIAGNOSIS — Z82.49 FAMILY HISTORY OF ATHEROSCLEROSIS: ICD-10-CM

## 2018-12-17 DIAGNOSIS — J30.9 ALLERGIC RHINITIS, UNSPECIFIED SEASONALITY, UNSPECIFIED TRIGGER: ICD-10-CM

## 2018-12-17 DIAGNOSIS — Z86.010 HISTORY OF COLON POLYPS: ICD-10-CM

## 2018-12-17 DIAGNOSIS — Z12.11 SCREEN FOR COLON CANCER: ICD-10-CM

## 2018-12-17 DIAGNOSIS — N40.1 BENIGN PROSTATIC HYPERPLASIA WITH URINARY FREQUENCY: ICD-10-CM

## 2018-12-17 DIAGNOSIS — R35.0 BENIGN PROSTATIC HYPERPLASIA WITH URINARY FREQUENCY: ICD-10-CM

## 2018-12-17 DIAGNOSIS — I51.7 HEART ENLARGEMENT: ICD-10-CM

## 2018-12-17 DIAGNOSIS — Z00.00 ANNUAL PHYSICAL EXAM: ICD-10-CM

## 2018-12-17 DIAGNOSIS — Z00.00 ANNUAL PHYSICAL EXAM: Primary | ICD-10-CM

## 2018-12-17 LAB
BACTERIA #/AREA URNS AUTO: NORMAL /HPF
BASOPHILS # BLD AUTO: 0.06 K/UL
BASOPHILS NFR BLD: 0.6 %
BILIRUB UR QL STRIP: NEGATIVE
CLARITY UR REFRACT.AUTO: ABNORMAL
COLOR UR AUTO: ABNORMAL
DIFFERENTIAL METHOD: ABNORMAL
EOSINOPHIL # BLD AUTO: 0 K/UL
EOSINOPHIL NFR BLD: 0.4 %
ERYTHROCYTE [DISTWIDTH] IN BLOOD BY AUTOMATED COUNT: 13.2 %
GLUCOSE UR QL STRIP: NEGATIVE
HCT VFR BLD AUTO: 50.3 %
HGB BLD-MCNC: 16.5 G/DL
HGB UR QL STRIP: NEGATIVE
IMM GRANULOCYTES # BLD AUTO: 0.04 K/UL
IMM GRANULOCYTES NFR BLD AUTO: 0.4 %
KETONES UR QL STRIP: ABNORMAL
LEUKOCYTE ESTERASE UR QL STRIP: ABNORMAL
LYMPHOCYTES # BLD AUTO: 2.1 K/UL
LYMPHOCYTES NFR BLD: 22 %
MCH RBC QN AUTO: 31.3 PG
MCHC RBC AUTO-ENTMCNC: 32.8 G/DL
MCV RBC AUTO: 95 FL
MICROSCOPIC COMMENT: NORMAL
MONOCYTES # BLD AUTO: 0.7 K/UL
MONOCYTES NFR BLD: 7.4 %
NEUTROPHILS # BLD AUTO: 6.6 K/UL
NEUTROPHILS NFR BLD: 69.2 %
NITRITE UR QL STRIP: NEGATIVE
NRBC BLD-RTO: 0 /100 WBC
PH UR STRIP: 6 [PH] (ref 5–8)
PLATELET # BLD AUTO: 241 K/UL
PMV BLD AUTO: 9.9 FL
PROT UR QL STRIP: NEGATIVE
RBC # BLD AUTO: 5.28 M/UL
SP GR UR STRIP: 1.02 (ref 1–1.03)
URN SPEC COLLECT METH UR: ABNORMAL
WBC # BLD AUTO: 9.56 K/UL
WBC #/AREA URNS AUTO: 0 /HPF (ref 0–5)

## 2018-12-17 PROCEDURE — 80061 LIPID PANEL: CPT

## 2018-12-17 PROCEDURE — 99999 PR PBB SHADOW E&M-EST. PATIENT-LVL IV: CPT | Mod: PBBFAC,,, | Performed by: INTERNAL MEDICINE

## 2018-12-17 PROCEDURE — 80053 COMPREHEN METABOLIC PANEL: CPT

## 2018-12-17 PROCEDURE — 85025 COMPLETE CBC W/AUTO DIFF WBC: CPT

## 2018-12-17 PROCEDURE — 82607 VITAMIN B-12: CPT

## 2018-12-17 PROCEDURE — 36415 COLL VENOUS BLD VENIPUNCTURE: CPT | Mod: PO

## 2018-12-17 PROCEDURE — 99396 PREV VISIT EST AGE 40-64: CPT | Mod: S$GLB,,, | Performed by: INTERNAL MEDICINE

## 2018-12-17 PROCEDURE — 84443 ASSAY THYROID STIM HORMONE: CPT

## 2018-12-17 PROCEDURE — 81001 URINALYSIS AUTO W/SCOPE: CPT

## 2018-12-17 PROCEDURE — 84153 ASSAY OF PSA TOTAL: CPT

## 2018-12-17 RX ORDER — ZOLPIDEM TARTRATE 10 MG/1
10 TABLET ORAL NIGHTLY PRN
Qty: 15 TABLET | Refills: 0 | Status: SHIPPED | OUTPATIENT
Start: 2018-12-17 | End: 2020-11-09

## 2018-12-17 NOTE — PROGRESS NOTES
REASON FOR VISIT:  This is a 58-year-old male who comes in for an annual routine   visit; however, a number of things to discuss.    He would like to have his carotid arteries checked mainly because his mother has   had a stroke and right carotid artery disease.  He has not been told of any   problems with his carotids.    He needs to have a colonoscopy.  A colonoscopy about eight years ago revealed a   polyp.    He is inquiring about having a prostate ablation only because a friend of his   had it.  At one time he was on the medication, finasteride, which may have   helped urine flow, but caused him to be down and having no ambition.  His   urination is such that first thing in the morning or when he urinates at night   the urine stream is slow.  At times, after urinating, he feels he got to go back   5 or 10 minutes later, and depending on how much he drinks in the evening, he   can urinate anywhere from twice a night to four to five times a night.    Occasionally, there is urgency.    He would like to have a prescription of Ambien 10 mg half a tablet, which has   been given to him in the past.  It is not frequent use, but occasionally it is   helpful when he has difficulty going to sleep.  It appears that is no more than   once a month.    He stated that years ago he was given medications, had allergic reaction,   thought that he might have been having a heart attack.  He states that it was   not the case, but an echo of his heart was performed, which he was told was on   the upper end of normal as far as size.    PAST MEDICAL HISTORY:  1.  Allergic rhinitis for which he is seeing an allergist and gets allergy   injections.  2.  Right inguinal hernia repair.  3.  Eye surgery.    SOCIAL HISTORY:  Tobacco use is limited and sporadic.  Exercise, goes to the gym   twice a week.  alcohol use, none.    FAMILY HISTORY:  Mother is , stroke, heart disease, Alzheimer's.  Father   is , unknown type of  cancer.  One brother, no known major health   conditions.    REVIEW OF SYMPTOMS:  He endorses no chest pain, palpitations, shortness of   breath or abdominal pain.  Has regular bowel function every day.  No chronic   headaches.  No arthralgias.  No problems with indigestion or heartburn.    PHYSICAL EXAMINATION:  VITAL SIGNS:  His weight is 189 pounds, pulse rate 72, blood pressure reading   128/76.  HEENT:  Tympanic membranes normal.  Nasal mucosa is clear.  Oropharynx, no   abnormal findings.  NECK:  No thyromegaly.  No masses.  LUNGS:  Clear breath sounds, good effort.  HEART:  Regular rate and rhythm.  ABDOMEN:  Active bowel sounds, soft, nontender.  No hepatosplenomegaly or   abdominal masses.  PULSES:  2+ carotid pulses, no bruits, 2+ pedal pulses.  GENITALIA:  No scrotal masses, no hernias.  DIGITAL RECTAL EXAM:  Stool is brown.  Prostate is mildly enlarged.    IMPRESSION:  1.  General examination.  2.  BPH with frequency and nocturia.  3.  History of colon polyps.  4.  Insomnia.  5.  Family history of stroke and carotid artery disease.  6.  Reported history of either cardiomegaly or left ventricular hypertrophy.    PLAN:  Comprehensive set of labs, screening colonoscopy, refer to Urology.  We   will arrange for a carotid Doppler and 2D echo.  Maintain proper diet and   physical activity and phone review to follow up.          /ls [id number] blank(s)        JAM/HN  dd: 12/17/2018 13:39:32 (CST)  td: 12/17/2018 22:15:04 (CST)  Doc ID   #[id number]  Job ID #[id number]    CC:       Answers for HPI/ROS submitted by the patient on 12/17/2018   activity change: No  unexpected weight change: No  neck pain: No  hearing loss: No  rhinorrhea: No  trouble swallowing: No  eye discharge: No  visual disturbance: No  chest tightness: No  wheezing: No  chest pain: No  palpitations: No  blood in stool: No  constipation: No  vomiting: No  diarrhea: No  polydipsia: No  polyuria: No  difficulty urinating: No  urgency: No  hematuria:  No  joint swelling: No  arthralgias: No  headaches: No  weakness: No  confusion: No  dysphoric mood: No

## 2018-12-18 ENCOUNTER — OFFICE VISIT (OUTPATIENT)
Dept: DERMATOLOGY | Facility: CLINIC | Age: 58
End: 2018-12-18
Payer: COMMERCIAL

## 2018-12-18 DIAGNOSIS — Z12.83 SCREENING EXAM FOR SKIN CANCER: ICD-10-CM

## 2018-12-18 DIAGNOSIS — L82.1 SK (SEBORRHEIC KERATOSIS): ICD-10-CM

## 2018-12-18 DIAGNOSIS — D22.9 MULTIPLE BENIGN NEVI: ICD-10-CM

## 2018-12-18 DIAGNOSIS — L57.3 POIKILODERMA OF CIVATTE: ICD-10-CM

## 2018-12-18 DIAGNOSIS — L57.0 AK (ACTINIC KERATOSIS): Primary | ICD-10-CM

## 2018-12-18 LAB
ALBUMIN SERPL BCP-MCNC: 4.1 G/DL
ALP SERPL-CCNC: 53 U/L
ALT SERPL W/O P-5'-P-CCNC: 46 U/L
ANION GAP SERPL CALC-SCNC: 11 MMOL/L
AST SERPL-CCNC: 32 U/L
BILIRUB SERPL-MCNC: 1.4 MG/DL
BUN SERPL-MCNC: 19 MG/DL
CALCIUM SERPL-MCNC: 9.5 MG/DL
CHLORIDE SERPL-SCNC: 104 MMOL/L
CHOLEST SERPL-MCNC: 204 MG/DL
CHOLEST/HDLC SERPL: 3.6 {RATIO}
CO2 SERPL-SCNC: 23 MMOL/L
COMPLEXED PSA SERPL-MCNC: 4.6 NG/ML
CREAT SERPL-MCNC: 1 MG/DL
EST. GFR  (AFRICAN AMERICAN): >60 ML/MIN/1.73 M^2
EST. GFR  (NON AFRICAN AMERICAN): >60 ML/MIN/1.73 M^2
GLUCOSE SERPL-MCNC: 92 MG/DL
HDLC SERPL-MCNC: 57 MG/DL
HDLC SERPL: 27.9 %
LDLC SERPL CALC-MCNC: 135.8 MG/DL
NONHDLC SERPL-MCNC: 147 MG/DL
POTASSIUM SERPL-SCNC: 4.9 MMOL/L
PROT SERPL-MCNC: 7.1 G/DL
SODIUM SERPL-SCNC: 138 MMOL/L
TRIGL SERPL-MCNC: 56 MG/DL
TSH SERPL DL<=0.005 MIU/L-ACNC: 0.47 UIU/ML
VIT B12 SERPL-MCNC: 511 PG/ML

## 2018-12-18 PROCEDURE — 17000 DESTRUCT PREMALG LESION: CPT | Mod: S$GLB,,, | Performed by: DERMATOLOGY

## 2018-12-18 PROCEDURE — 99203 OFFICE O/P NEW LOW 30 MIN: CPT | Mod: 25,S$GLB,, | Performed by: DERMATOLOGY

## 2018-12-18 PROCEDURE — 99999 PR PBB SHADOW E&M-EST. PATIENT-LVL III: CPT | Mod: PBBFAC,,, | Performed by: DERMATOLOGY

## 2018-12-18 NOTE — PROGRESS NOTES
Subjective:       Patient ID:  Jovi Pacheco is a 58 y.o. male who presents for   Chief Complaint   Patient presents with    Skin Check     UBSE      Patient complains of lesion(s)  Location: left thigh  Duration: 2 months  Symptoms: scabs and gotten smaller  Relieving factors/Previous treatments: none    No h/o nmsc or mm    Wants TBSE          Review of Systems   Skin: Positive for activity-related sunscreen use and wears hat. Negative for daily sunscreen use and recent sunburn.   Hematologic/Lymphatic: Does not bruise/bleed easily.        Objective:    Physical Exam   Constitutional: He appears well-developed and well-nourished. No distress.   Neurological: He is alert and oriented to person, place, and time. He is not disoriented.   Psychiatric: He has a normal mood and affect.   Skin:   Areas Examined (abnormalities noted in diagram):   Scalp / Hair Palpated and Inspected  Head / Face Inspection Performed  Neck Inspection Performed  Chest / Axilla Inspection Performed  Abdomen Inspection Performed  Genitals / Buttocks / Groin Inspection Performed  Back Inspection Performed  RUE Inspected  LUE Inspection Performed  RLE Inspected  LLE Inspection Performed  Nails and Digits Inspection Performed                       Diagram Legend     Erythematous scaling macule/papule c/w actinic keratosis       Vascular papule c/w angioma      Pigmented verrucoid papule/plaque c/w seborrheic keratosis      Yellow umbilicated papule c/w sebaceous hyperplasia      Irregularly shaped tan macule c/w lentigo     1-2 mm smooth white papules consistent with Milia      Movable subcutaneous cyst with punctum c/w epidermal inclusion cyst      Subcutaneous movable cyst c/w pilar cyst      Firm pink to brown papule c/w dermatofibroma      Pedunculated fleshy papule(s) c/w skin tag(s)      Evenly pigmented macule c/w junctional nevus     Mildly variegated pigmented, slightly irregular-bordered macule c/w mildly atypical nevus       Flesh colored to evenly pigmented papule c/w intradermal nevus       Pink pearly papule/plaque c/w basal cell carcinoma      Erythematous hyperkeratotic cursted plaque c/w SCC      Surgical scar with no sign of skin cancer recurrence      Open and closed comedones      Inflammatory papules and pustules      Verrucoid papule consistent consistent with wart     Erythematous eczematous patches and plaques     Dystrophic onycholytic nail with subungual debris c/w onychomycosis     Umbilicated papule    Erythematous-base heme-crusted tan verrucoid plaque consistent with inflamed seborrheic keratosis     Erythematous Silvery Scaling Plaque c/w Psoriasis     See annotation      Assessment / Plan:        AK (actinic keratosis)  Cryosurgery Procedure Note    Verbal consent from the patient is obtained including, but not limited to, risk of hypopigmentation/hyperpigmentation, scar, recurrence of lesion. The patient is aware of the precancerous quality and need for treatment of these lesions. Liquid nitrogen cryosurgery is applied to the 1 actinic keratoses, as detailed in the physical exam, to produce a freeze injury. The patient is aware that blisters may form and is instructed on wound care with gentle cleansing and use of vaseline ointment to keep moist until healed. The patient is supplied a handout on cryosurgery and is instructed to call if lesions do not completely resolve.    Wear hat always      SK (seborrheic keratosis) - left thigh  These are benign inherited growths without a malignant potential. Reassurance given to patient. No treatment is necessary.       Multiple benign nevi  total body skin examination performed today including at least 12 points as noted in physical examination. No lesions suspicious for malignancy noted.  Reassurance provided.  Instructed patient to observe lesion(s) for changes and follow up in clinic if changes are noted. Discussed ABCDE's of moles and brochure provided.      Screening  exam for skin cancer  Total body skin examination performed today including at least 12 points as noted in physical examination. No lesions suspicious for malignancy noted.      Poikiloderma of [place]  This is a common finding on necks, chests and sometimes backs and shoulders after chronic sun exposure. Aggressive sun protection is recommended.                Follow-up in about 1 year (around 12/18/2019).

## 2018-12-18 NOTE — PATIENT INSTRUCTIONS

## 2018-12-19 DIAGNOSIS — Z12.11 SPECIAL SCREENING FOR MALIGNANT NEOPLASMS, COLON: Primary | ICD-10-CM

## 2018-12-19 RX ORDER — SODIUM, POTASSIUM,MAG SULFATES 17.5-3.13G
1 SOLUTION, RECONSTITUTED, ORAL ORAL DAILY
Qty: 1 KIT | Refills: 0 | Status: SHIPPED | OUTPATIENT
Start: 2018-12-19 | End: 2019-01-19

## 2018-12-21 ENCOUNTER — HOSPITAL ENCOUNTER (OUTPATIENT)
Dept: CARDIOLOGY | Facility: CLINIC | Age: 58
Discharge: HOME | End: 2018-12-21
Attending: INTERNAL MEDICINE
Payer: COMMERCIAL

## 2018-12-21 VITALS
HEIGHT: 73 IN | DIASTOLIC BLOOD PRESSURE: 85 MMHG | WEIGHT: 189 LBS | HEART RATE: 72 BPM | SYSTOLIC BLOOD PRESSURE: 123 MMHG | BODY MASS INDEX: 25.05 KG/M2

## 2018-12-21 DIAGNOSIS — Z82.49 FAMILY HISTORY OF ATHEROSCLEROSIS: ICD-10-CM

## 2018-12-21 DIAGNOSIS — I51.7 HEART ENLARGEMENT: ICD-10-CM

## 2018-12-21 LAB
ASCENDING AORTA: 3.72 CM
AV INDEX (PROSTH): 0.66
AV MEAN GRADIENT: 3.17 MMHG
AV PEAK GRADIENT: 5.11 MMHG
AV VALVE AREA: 2.71 CM2
BSA FOR ECHO PROCEDURE: 2.1 M2
CV ECHO LV RWT: 0.23 CM
DOP CALC AO PEAK VEL: 1.13 M/S
DOP CALC AO VTI: 22.17 CM
DOP CALC LVOT AREA: 4.08 CM2
DOP CALC LVOT DIAMETER: 2.28 CM
DOP CALC LVOT STROKE VOLUME: 60.11 CM3
DOP CALCLVOT PEAK VEL VTI: 14.73 CM
E WAVE DECELERATION TIME: 311.1 MSEC
E/A RATIO: 0.83
E/E' RATIO: 4.22
ECHO LV POSTERIOR WALL: 0.63 CM (ref 0.6–1.1)
FRACTIONAL SHORTENING: 38 % (ref 28–44)
INTERVENTRICULAR SEPTUM: 0.76 CM (ref 0.6–1.1)
IVRT: 0.1 MSEC
LA MAJOR: 6.19 CM
LA MINOR: 6.14 CM
LA WIDTH: 4.03 CM
LEFT ATRIUM SIZE: 4.01 CM
LEFT ATRIUM VOLUME INDEX: 40.3 ML/M2
LEFT ATRIUM VOLUME: 84.68 CM3
LEFT INTERNAL DIMENSION IN SYSTOLE: 3.36 CM (ref 2.1–4)
LEFT VENTRICLE DIASTOLIC VOLUME INDEX: 66.85 ML/M2
LEFT VENTRICLE DIASTOLIC VOLUME: 140.43 ML
LEFT VENTRICLE MASS INDEX: 61.7 G/M2
LEFT VENTRICLE SYSTOLIC VOLUME INDEX: 21.9 ML/M2
LEFT VENTRICLE SYSTOLIC VOLUME: 46.06 ML
LEFT VENTRICULAR INTERNAL DIMENSION IN DIASTOLE: 5.39 CM (ref 3.5–6)
LEFT VENTRICULAR MASS: 129.62 G
LV LATERAL E/E' RATIO: 3.45
LV SEPTAL E/E' RATIO: 5.43
MV PEAK A VEL: 0.46 M/S
MV PEAK E VEL: 0.38 M/S
PISA TR MAX VEL: 2.34 M/S
PULM VEIN S/D RATIO: 1.31
PV PEAK D VEL: 0.42 M/S
PV PEAK S VEL: 0.55 M/S
RA MAJOR: 5.5 CM
RA WIDTH: 5.11 CM
RETIRED EF AND QEF - SEE NOTES: 49 %
RIGHT VENTRICULAR END-DIASTOLIC DIMENSION: 4.65 CM
RV TISSUE DOPPLER FREE WALL SYSTOLIC VELOCITY 1 (APICAL 4 CHAMBER VIEW): 11.62 M/S
SINUS: 3.09 CM
STJ: 3 CM
TDI LATERAL: 0.11
TDI SEPTAL: 0.07
TDI: 0.09
TR MAX PG: 21.9 MMHG
TRICUSPID ANNULAR PLANE SYSTOLIC EXCURSION: 2.03 CM

## 2018-12-21 PROCEDURE — 93308 TTE F-UP OR LMTD: CPT | Mod: S$GLB,,, | Performed by: INTERNAL MEDICINE

## 2018-12-24 ENCOUNTER — HOSPITAL ENCOUNTER (OUTPATIENT)
Dept: VASCULAR SURGERY | Facility: CLINIC | Age: 58
Discharge: HOME | End: 2018-12-24
Attending: INTERNAL MEDICINE
Payer: COMMERCIAL

## 2018-12-24 DIAGNOSIS — Z82.49 FAMILY HISTORY OF ATHEROSCLEROSIS: ICD-10-CM

## 2018-12-24 DIAGNOSIS — I65.22 STENOSIS OF LEFT CAROTID ARTERY: ICD-10-CM

## 2018-12-24 PROCEDURE — 93880 EXTRACRANIAL BILAT STUDY: CPT | Mod: S$GLB,,, | Performed by: SURGERY

## 2018-12-27 ENCOUNTER — PATIENT MESSAGE (OUTPATIENT)
Dept: INTERNAL MEDICINE | Facility: CLINIC | Age: 58
End: 2018-12-27

## 2018-12-31 ENCOUNTER — TELEPHONE (OUTPATIENT)
Dept: INTERNAL MEDICINE | Facility: CLINIC | Age: 58
End: 2018-12-31

## 2018-12-31 ENCOUNTER — PATIENT MESSAGE (OUTPATIENT)
Dept: INTERNAL MEDICINE | Facility: CLINIC | Age: 58
End: 2018-12-31

## 2018-12-31 DIAGNOSIS — I51.9 LEFT VENTRICULAR DYSFUNCTION: Primary | ICD-10-CM

## 2018-12-31 NOTE — TELEPHONE ENCOUNTER
Scheduled appointment, 1/2/2019@1015am main building Deejay naik, notified patient via the portal.----- Message from Saturnino Hernandez MD sent at 12/31/2018 11:37 AM CST -----  Judy    Regarding this patient, I was ion communication with him via FindIt messaging , in which you were able to see    Try to contact him regarding setting up the cardiac stress test in which it appears he is agreeable to this    Let me know either way and the date if it is ordered   The order is in

## 2019-01-02 ENCOUNTER — PATIENT MESSAGE (OUTPATIENT)
Dept: INTERNAL MEDICINE | Facility: CLINIC | Age: 59
End: 2019-01-02

## 2019-01-02 ENCOUNTER — OFFICE VISIT (OUTPATIENT)
Dept: OPTOMETRY | Facility: CLINIC | Age: 59
End: 2019-01-02
Payer: COMMERCIAL

## 2019-01-02 DIAGNOSIS — H43.393 VITREOUS SYNERESIS OF BOTH EYES: ICD-10-CM

## 2019-01-02 DIAGNOSIS — H52.4 PRESBYOPIA: ICD-10-CM

## 2019-01-02 DIAGNOSIS — Z98.890 HISTORY OF PHOTOREFRACTIVE KERATECTOMY (PRK): ICD-10-CM

## 2019-01-02 DIAGNOSIS — H25.13 NS (NUCLEAR SCLEROSIS), BILATERAL: Primary | ICD-10-CM

## 2019-01-02 PROCEDURE — 92014 PR EYE EXAM, EST PATIENT,COMPREHESV: ICD-10-PCS | Mod: S$GLB,,, | Performed by: OPTOMETRIST

## 2019-01-02 PROCEDURE — 92014 COMPRE OPH EXAM EST PT 1/>: CPT | Mod: S$GLB,,, | Performed by: OPTOMETRIST

## 2019-01-02 PROCEDURE — 99999 PR PBB SHADOW E&M-EST. PATIENT-LVL II: ICD-10-PCS | Mod: PBBFAC,,, | Performed by: OPTOMETRIST

## 2019-01-02 PROCEDURE — 99999 PR PBB SHADOW E&M-EST. PATIENT-LVL II: CPT | Mod: PBBFAC,,, | Performed by: OPTOMETRIST

## 2019-01-02 NOTE — PROGRESS NOTES
HPI     Patient's age: 58 y.o.    Approximate date of last eye examination:  12/06/2017  Name of last eye doctor seen: Dr. Johnson      Pt states OD slight decline in va Os doing well no other changes     Wears glasses? Readers +1.75      Wears CLs?:  no           Headaches?  no  Eye pain/discomfort?  no                                                                                     Flashes?  no  Floaters?  yes  Diplopia/Double vision?  no    Patient's Ocular History:         Any eye surgeries? lasik  October 27th 2010         Any eye injury?  no         Any treatment for eye disease?  no  Family history of eye disease?  no    Significant patient medical history:         1. Diabetes?  no       If yes, IDDM or NIDDM? no   2. HBP?  no                 ! OTC eyedrops currently using:  Refresh    ! Prescription eye meds currently using:  none           Last edited by Libby Carvalho on 1/2/2019  3:47 PM. (History)            Assessment /Plan     For exam results, see Encounter Report.      History of photorefractive keratectomy (PRK)  Dry eye syndrome, bilateral              Use refresh liquigel BID/ PRN     Vitreous syneresis of both eyes              No holes, tears or RD     NS (nuclear sclerosis), bilateral              Mild, monitor     Presbyopia   OK to continue with Readers

## 2019-01-03 ENCOUNTER — OFFICE VISIT (OUTPATIENT)
Dept: UROLOGY | Facility: CLINIC | Age: 59
End: 2019-01-03
Payer: COMMERCIAL

## 2019-01-03 VITALS
WEIGHT: 196.19 LBS | DIASTOLIC BLOOD PRESSURE: 77 MMHG | BODY MASS INDEX: 26 KG/M2 | HEIGHT: 73 IN | SYSTOLIC BLOOD PRESSURE: 119 MMHG | HEART RATE: 64 BPM

## 2019-01-03 DIAGNOSIS — N40.1 BPH WITH URINARY OBSTRUCTION: Primary | ICD-10-CM

## 2019-01-03 DIAGNOSIS — N13.8 BPH WITH URINARY OBSTRUCTION: Primary | ICD-10-CM

## 2019-01-03 DIAGNOSIS — R97.20 ELEVATED PROSTATE SPECIFIC ANTIGEN (PSA): ICD-10-CM

## 2019-01-03 PROBLEM — K40.90 RIGHT INGUINAL HERNIA: Status: RESOLVED | Noted: 2017-09-01 | Resolved: 2019-01-03

## 2019-01-03 PROBLEM — Z98.890 S/P INGUINAL HERNIA REPAIR: Status: RESOLVED | Noted: 2017-09-01 | Resolved: 2019-01-03

## 2019-01-03 PROBLEM — Z87.19 S/P INGUINAL HERNIA REPAIR: Status: RESOLVED | Noted: 2017-09-01 | Resolved: 2019-01-03

## 2019-01-03 PROCEDURE — 99999 PR PBB SHADOW E&M-EST. PATIENT-LVL III: CPT | Mod: PBBFAC,,, | Performed by: UROLOGY

## 2019-01-03 PROCEDURE — 99999 PR PBB SHADOW E&M-EST. PATIENT-LVL III: ICD-10-PCS | Mod: PBBFAC,,, | Performed by: UROLOGY

## 2019-01-03 PROCEDURE — 99244 OFF/OP CNSLTJ NEW/EST MOD 40: CPT | Mod: S$GLB,,, | Performed by: UROLOGY

## 2019-01-03 PROCEDURE — 99244 PR OFFICE CONSULTATION,LEVEL IV: ICD-10-PCS | Mod: S$GLB,,, | Performed by: UROLOGY

## 2019-01-03 RX ORDER — LIDOCAINE HYDROCHLORIDE 20 MG/ML
JELLY TOPICAL ONCE
Status: CANCELLED | OUTPATIENT
Start: 2019-01-03 | End: 2019-01-03

## 2019-01-03 NOTE — LETTER
January 3, 2019      Saturnino Hernandez MD  [address] [facility]  [facility] [address]           [facility] - Urology 4th Floor  [address] [facility]  [facility] [address]  Phone: [phone]          Patient: Jovi Pacheco   MR Number: [id number]   YOB: 1960   Date of Visit: 1/3/2019       Dear Dr. Saturnino Hernandez:    Thank you for referring Jovi Pacheco to me for evaluation. Attached you will find relevant portions of my assessment and plan of care.    If you have questions, please do not hesitate to call me. I look forward to following Jovi Pacheco along with you.    Sincerely,    Kayode Zabala MD    Enclosure  CC:  No Recipients    If you would like to receive this communication electronically, please contact [email or web address] or [phone] to request more information on [employer] access.    For providers and/or their staff who would like to refer a patient to [facility], please contact us through our one-stop-shop provider referral line, [facility], at [phone].    If you feel you have received this communication in error or would no longer like to receive these types of communications, please e-mail [email or web address]

## 2019-01-03 NOTE — PROGRESS NOTES
CHIEF COMPLAINT:    Mr. Pacheco is a 58 y.o. male presenting for a consultation at the request of Dr. Hernandez. Patient presents with LUTS.    PRESENTING ILLNESS:    Jovi Pacheco is a 58 y.o. male who c/o LUTS.  He has nocturia x 3, + feeling of incomplete emptying, + decreased FOS.  No hematuria.  No dysuria.  He'd like a procedure done as he doesn't like meds.    He has some mild ED, but it's not bothersome.    REVIEW OF SYSTEMS:    Jovi Pacheco denies headache, blurred vision, fever, nausea, vomiting, chills, abdominal pain, bleeding per rectum, cough, SOB, recent loss of consciousness, recent mental status changes, seizures, dizziness, or upper or lower extremity weakness.    FREDERICK  1. 3  2. 2  3. 2  4. 5  5. 4      PATIENT HISTORY:    Past Medical History:   Diagnosis Date    Allergic rhinitis 12/17/2018    Family history of atherosclerosis     Keloid cicatrix     S/P right inguinal hernia repair with mesh 9/1/2017       Past Surgical History:   Procedure Laterality Date    EYE SURGERY      HERNIA REPAIR Right     inguinal    REPAIR-HERNIA-INGUINAL-INITIAL (5 YRS+) Open with Mesh Right 9/1/2017    Performed by Joshua Goldberg, MD at [facility] OR [address] [place] [address]       Family History   Problem Relation Age of Onset    Heart disease Mother     Stroke Mother     Cancer Father     No Known Problems Brother     Melanoma Neg Hx        Social History     Socioeconomic History    Marital status:      Spouse name: Not on file    Number of children: Not on file    Years of education: Not on file    Highest education level: Not on file   Social Needs    Financial resource strain: Not on file    Food insecurity - worry: Not on file    Food insecurity - inability: Not on file    Transportation needs - medical: Not on file    Transportation needs - non-medical: Not on file   Occupational History    Not on file   Tobacco Use    Smoking status: Never Smoker    Smokeless tobacco: Never Used    Substance and Sexual Activity    Alcohol use: Yes     Alcohol/week: 0.0 oz     Comment: limited/sporadic    Drug use: No    Sexual activity: Not on file   Other Topics Concern    Not on file   Social History Narrative    Not on file       Allergies:  Cat/feline products; Dog dander; and Peanut    Medications:    Current Outpatient Medications:     aspirin (ECOTRIN) 81 MG EC tablet, Take 81 mg by mouth once daily., Disp: , Rfl:     zolpidem (AMBIEN) 10 mg Tab, Take 1 tablet (10 mg total) by mouth nightly as needed., Disp: 15 tablet, Rfl: 0    PHYSICAL EXAMINATION:    The patient generally appears in good health, is appropriately interactive, and is in no apparent distress.     Eyes: anicteric sclerae, moist conjunctivae; no lid-lag; PERRLA     HENT: Atraumatic; oropharynx clear with moist mucous membranes and no mucosal ulcerations;normal hard and soft palate.  No evidence of lymphadenopathy.    Neck: Trachea midline.  No thyromegaly.    Musculoskeletal: No abnormal gait.    Skin: No lesions.    Mental: Cooperative with normal affect.  Is oriented to time, place, and person.    Neuro: Grossly intact.    Chest: Normal inspiratory effort.   No accessory muscles.  No audible wheezes.  Respirations symmetric on inspiration and expiration.    Heart: Regular rhythm.      Abdomen:  Soft, non-tender. No masses or organomegaly. Bladder is not palpable. No evidence of flank discomfort. No evidence of inguinal hernia.    Genitourinary: The penis is not circumcised with no evidence of plaques or induration. The urethral meatus is normal. The testes, epididymides, and cord structures are normal in size and contour bilaterally. The scrotum is normal in size and contour.    Normal anal sphincter tone. No rectal mass.    The prostate is 45 g. Normal landmarks. Lateral sulci. Median furrow intact.  No nodularity or induration. Seminal vesicles are normal.    Extremities: No clubbing, cyanosis, or edema      LABS:    UA  dipped negative today  Lab Results   Component Value Date    PSA 4.6 (H) 12/17/2018    PSA 2.6 02/11/2010    PSATOTAL 2.5 02/15/2010    PSAFREE 0.27 02/15/2010    PSAFREEPCT 10.80 02/15/2010       IMPRESSION:    Encounter Diagnoses   Name Primary?    BPH with urinary obstruction Yes         PLAN:    1. Discussed that his PSA is elevated.  Will recheck in 2 weeks.  2. Will cysto to see if he is a candidate for rezum.   3. Discussed risks/benefits of Rezum.  Discussed bleeding, frequency, failure amongst other risks.  Also discussed very small risk of EjD and ED.  He was given the chance to ask questions.  Alternatives discussed.      Copy to:

## 2019-01-04 ENCOUNTER — HOSPITAL ENCOUNTER (OUTPATIENT)
Dept: CARDIOLOGY | Facility: CLINIC | Age: 59
Discharge: HOME | End: 2019-01-04
Attending: INTERNAL MEDICINE
Payer: COMMERCIAL

## 2019-01-04 VITALS — HEIGHT: 73 IN | WEIGHT: 188 LBS | BODY MASS INDEX: 24.92 KG/M2

## 2019-01-04 DIAGNOSIS — I51.9 LEFT VENTRICULAR DYSFUNCTION: ICD-10-CM

## 2019-01-04 LAB
ASCENDING AORTA: 3.4 CM
BSA FOR ECHO PROCEDURE: 2.1 M2
CV ECHO LV RWT: 0.25 CM
CV STRESS BASE HR: 71
DIASTOLIC BLOOD PRESSURE: 80
DOP CALC LVOT AREA: 3.76 CM2
DOP CALC LVOT DIAMETER: 2.19 CM
DOP CALC LVOT STROKE VOLUME: 74.47 CM3
DOP CALCLVOT PEAK VEL VTI: 19.78 CM
E WAVE DECELERATION TIME: 141.94 MSEC
E/A RATIO: 0.98
E/E' RATIO: 5.22
ECHO LV POSTERIOR WALL: 0.7 CM (ref 0.6–1.1)
FRACTIONAL SHORTENING: 35 % (ref 28–44)
INTERVENTRICULAR SEPTUM: 0.8 CM (ref 0.6–1.1)
LA MAJOR: 5.53 CM
LA MINOR: 5.16 CM
LA WIDTH: 3.81 CM
LEFT ATRIUM SIZE: 3.38 CM
LEFT ATRIUM VOLUME INDEX: 27.9 ML/M2
LEFT ATRIUM VOLUME: 58.44 CM3
LEFT INTERNAL DIMENSION IN SYSTOLE: 3.72 CM (ref 2.1–4)
LEFT VENTRICLE DIASTOLIC VOLUME INDEX: 75.98 ML/M2
LEFT VENTRICLE DIASTOLIC VOLUME: 159.25 ML
LEFT VENTRICLE MASS INDEX: 74.7 G/M2
LEFT VENTRICLE SYSTOLIC VOLUME INDEX: 28.1 ML/M2
LEFT VENTRICLE SYSTOLIC VOLUME: 58.98 ML
LEFT VENTRICULAR INTERNAL DIMENSION IN DIASTOLE: 5.69 CM (ref 3.5–6)
LEFT VENTRICULAR MASS: 156.58 G
LV LATERAL E/E' RATIO: 4.7
LV SEPTAL E/E' RATIO: 5.88
MV PEAK A VEL: 0.48 M/S
MV PEAK E VEL: 0.47 M/S
OHS CV CPX 1 MINUTE RECOVERY HEART RATE: 157 BPM
OHS CV CPX 85 PERCENT MAX PREDICTED HEART RATE MALE: 136
OHS CV CPX ESTIMATED METS: 18
OHS CV CPX MAX PREDICTED HEART RATE: 160
OHS CV CPX PATIENT IS FEMALE: 0
OHS CV CPX PATIENT IS MALE: 1
OHS CV CPX PEAK DIASTOLIC BLOOD PRESSURE: 113 MMHG
OHS CV CPX PEAK HEAR RATE: 157
OHS CV CPX PEAK RATE PRESSURE PRODUCT: NORMAL
OHS CV CPX PEAK SYSTOLIC BLOOD PRESSURE: 166
OHS CV CPX PERCENT MAX PREDICTED HEART RATE ACHIEVED: 98
OHS CV CPX PERCENT TARGET HEART RATE ACHIEVED: 115.44
OHS CV CPX RATE PRESSURE PRODUCT PRESENTING: NORMAL
OHS CV CPX TARGET HEART RATE: 136
PULM VEIN S/D RATIO: 1.39
PV PEAK D VEL: 0.41 M/S
PV PEAK S VEL: 0.57 M/S
RA MAJOR: 5 CM
RA PRESSURE: 8 MMHG
RA WIDTH: 3.98 CM
RIGHT VENTRICULAR END-DIASTOLIC DIMENSION: 4.43 CM
SINUS: 3.3 CM
STJ: 2.8 CM
STRESS ECHO POST EXERCISE DUR MIN: 10 MIN
STRESS ECHO POST EXERCISE DUR SEC: 11
SYSTOLIC BLOOD PRESSURE: 141
TDI LATERAL: 0.1
TDI SEPTAL: 0.08
TDI: 0.09
TRICUSPID ANNULAR PLANE SYSTOLIC EXCURSION: 2.42 CM

## 2019-01-04 PROCEDURE — 93351 STRESS TTE COMPLETE: CPT | Mod: S$GLB,,, | Performed by: INTERNAL MEDICINE

## 2019-01-04 PROCEDURE — 93351 ECHOCARDIOGRAM STRESS TEST (CUPID ONLY): ICD-10-PCS | Mod: S$GLB,,, | Performed by: INTERNAL MEDICINE

## 2019-01-09 ENCOUNTER — OFFICE VISIT (OUTPATIENT)
Dept: INTERNAL MEDICINE | Facility: CLINIC | Age: 59
End: 2019-01-09
Payer: COMMERCIAL

## 2019-01-09 VITALS
OXYGEN SATURATION: 98 % | WEIGHT: 187 LBS | SYSTOLIC BLOOD PRESSURE: 127 MMHG | HEIGHT: 73 IN | DIASTOLIC BLOOD PRESSURE: 70 MMHG | BODY MASS INDEX: 24.78 KG/M2 | HEART RATE: 69 BPM

## 2019-01-09 DIAGNOSIS — R97.20 PSA ELEVATION: ICD-10-CM

## 2019-01-09 DIAGNOSIS — I47.29 NSVT (NONSUSTAINED VENTRICULAR TACHYCARDIA): Primary | ICD-10-CM

## 2019-01-09 DIAGNOSIS — R74.01 ALT (SGPT) LEVEL RAISED: ICD-10-CM

## 2019-01-09 DIAGNOSIS — I49.3 PVC (PREMATURE VENTRICULAR CONTRACTION): ICD-10-CM

## 2019-01-09 DIAGNOSIS — I65.22 CAROTID ARTERY PLAQUE, LEFT: ICD-10-CM

## 2019-01-09 PROCEDURE — 99999 PR PBB SHADOW E&M-EST. PATIENT-LVL IV: ICD-10-PCS | Mod: PBBFAC,,, | Performed by: INTERNAL MEDICINE

## 2019-01-09 PROCEDURE — 99214 OFFICE O/P EST MOD 30 MIN: CPT | Mod: S$GLB,,, | Performed by: INTERNAL MEDICINE

## 2019-01-09 PROCEDURE — 3008F PR BODY MASS INDEX (BMI) DOCUMENTED: ICD-10-PCS | Mod: CPTII,S$GLB,, | Performed by: INTERNAL MEDICINE

## 2019-01-09 PROCEDURE — 99999 PR PBB SHADOW E&M-EST. PATIENT-LVL IV: CPT | Mod: PBBFAC,,, | Performed by: INTERNAL MEDICINE

## 2019-01-09 PROCEDURE — 99214 PR OFFICE/OUTPT VISIT, EST, LEVL IV, 30-39 MIN: ICD-10-PCS | Mod: S$GLB,,, | Performed by: INTERNAL MEDICINE

## 2019-01-09 PROCEDURE — 3008F BODY MASS INDEX DOCD: CPT | Mod: CPTII,S$GLB,, | Performed by: INTERNAL MEDICINE

## 2019-01-09 NOTE — PROGRESS NOTES
REASON FOR VISIT:  This is a 58-year-old male who comes for followup and review   of his testing from his visit with me on December 17th.  It was difficult to   reach him by phone and a lot of communication, which required extensive   discussion through e-mail.    His PSA was elevated at 4.6, he saw Urology, it is going to be repeated in two   weeks and he is going to have a cystoscopy.  It is felt that this is related to   BPH.    A carotid Doppler was done because of a family history of stroke and carotid   artery disease.  The results revealed 1 and 39% minimal plaque right carotid.    A 2D echo Doppler was performed because years ago he went to an urgent care   Emergency Room for suspected cardiac event and what he remembers shown was a   large heart chamber.  The 2D echo did show low normal ejection fraction of 50%   and mild mitral regurgitation.  Because of such, he underwent a cardiac stress   test, which was negative for ischemia, ejection fraction was low normal 50 to   55%, but there were frequent PVCs and one nonsustained V-tach run of 6 beats   with stress.    It was noted that his AST was minimally elevated at 46 eight years ago, it was   at 52.  His alcohol use is very minimal.    In regard to above, his cholesterol was slightly elevated at 203 with HDL of   135.  He remembers in the past his cholesterol used to be in the 180s.    Presently, he feels well.  No chest pain or shortness of breath.    PHYSICAL EXAMINATION:  VITAL SIGNS:  Today, his weight is 187 pounds, blood pressure 126/68, pulse 72.  LUNGS:  Clear.  HEART:  Regular rate and rhythm.  No murmurs detected.  PULSES:  2+ carotid pulses, no bruits.    IMPRESSION:  1.  Exertional arrhythmia with PVCs and nonsustained V-tach.  2.  Minimal carotid artery disease.  3.  Mild hyperlipidemia.  4.  Elevated liver enzymes.  5.  Elevated PSA.    PLAN:  When we will have his repeat PSA, we will get a hepatitis C and repeat   liver enzymes.  We will  refer to arrhythmia in regard to the arrhythmia that was   noted on the stress test.    I recommend the use of medication to lower the cholesterol, but he defers on   this.  He would like to see what he can do with diet and physical activity.          / [id number] review              JAM/ALEISHA  dd: 01/09/2019 15:21:55 (CST)  td: 01/10/2019 06:07:50 (CST)  Doc ID   #[id number]  Job ID #[id number]    CC:       Answers for HPI/ROS submitted by the patient on 1/2/2019   activity change: No  unexpected weight change: No  neck pain: No  hearing loss: No  rhinorrhea: Yes  trouble swallowing: No  eye discharge: No  visual disturbance: No  chest tightness: No  wheezing: No  chest pain: No  palpitations: No  blood in stool: No  constipation: No  vomiting: No  diarrhea: No  polydipsia: No  polyuria: No  difficulty urinating: Yes  urgency: No  hematuria: No  joint swelling: No  arthralgias: No  headaches: No  weakness: No  confusion: No  dysphoric mood: No

## 2019-01-10 ENCOUNTER — INITIAL CONSULT (OUTPATIENT)
Dept: ELECTROPHYSIOLOGY | Facility: CLINIC | Age: 59
End: 2019-01-10
Payer: COMMERCIAL

## 2019-01-10 VITALS
BODY MASS INDEX: 25.13 KG/M2 | DIASTOLIC BLOOD PRESSURE: 88 MMHG | HEIGHT: 73 IN | HEART RATE: 63 BPM | WEIGHT: 189.63 LBS | SYSTOLIC BLOOD PRESSURE: 120 MMHG

## 2019-01-10 DIAGNOSIS — I47.29 NSVT (NONSUSTAINED VENTRICULAR TACHYCARDIA): ICD-10-CM

## 2019-01-10 PROCEDURE — 99245 PR OFFICE CONSULTATION,LEVEL V: ICD-10-PCS | Mod: S$GLB,,, | Performed by: INTERNAL MEDICINE

## 2019-01-10 PROCEDURE — 99999 PR PBB SHADOW E&M-EST. PATIENT-LVL III: ICD-10-PCS | Mod: PBBFAC,,, | Performed by: INTERNAL MEDICINE

## 2019-01-10 PROCEDURE — 99999 PR PBB SHADOW E&M-EST. PATIENT-LVL III: CPT | Mod: PBBFAC,,, | Performed by: INTERNAL MEDICINE

## 2019-01-10 PROCEDURE — 93000 ELECTROCARDIOGRAM COMPLETE: CPT | Mod: S$GLB,,, | Performed by: INTERNAL MEDICINE

## 2019-01-10 PROCEDURE — 93000 RHYTHM STRIP: ICD-10-PCS | Mod: S$GLB,,, | Performed by: INTERNAL MEDICINE

## 2019-01-10 PROCEDURE — 99245 OFF/OP CONSLTJ NEW/EST HI 55: CPT | Mod: S$GLB,,, | Performed by: INTERNAL MEDICINE

## 2019-01-10 NOTE — PROGRESS NOTES
Subjective:    Patient ID:  Jovi Pacheco is a 58 y.o. male who presents for evaluation of NSVT      58 yoM here for evaluation of NSVT during a stress test. He underwent a routine echo for family history of cardiac disease. Echo showed EF 50%. Exercise stress echo showed no ischemia but he has several salvos of PMVT lasting up to 6 beats. He reported no symptoms during activity. He admits to heavy exertion during exercise. He can frequently push his heart rate above 150-160 bpm. He denies syncope, near syncope, palpitations, chest pain, dyspnea. No prior known arrhythmia or cardiac issues.     Echo 12/21/18:  · Low normal left ventricular systolic function. The estimated ejection fraction is 50%  · Normal LV diastolic function.  · Mild-moderate left atrial enlargement.  · Mild mitral sclerosis.  · Mild to moderate tricuspid regurgitation.  · Mild mitral regurgitation.  · Mild right ventricular enlargement.  · Normal right ventricular systolic function.    Stress echo 1/19:  · Low normal left ventricular systolic function. The estimated ejection fraction is 50-55%  · Mild left ventricular enlargement.  · Normal LV diastolic function.  · Mild right ventricular enlargement with normal systolic function.  · Intermediate central venous pressure (8 mm Hg).  · The stress echo portion of this study is negative for myocardial ischemia.  · The EKG portion of this study is negative for myocardial ischemia.  · Arrhythmias during stress: frequent PVCs, non-sustained NSVT (longest run was 6 beats).  · The patient reported no symptoms during the stress test.    Past Medical History:  12/17/2018: Allergic rhinitis  No date: Family history of atherosclerosis  No date: Keloid cicatrix  9/1/2017: S/P right inguinal hernia repair with mesh    Past Surgical History:  No date: EYE SURGERY  No date: HERNIA REPAIR; Right      Comment:  inguinal  9/1/2017: REPAIR-HERNIA-INGUINAL-INITIAL (5 YRS+) Open with Mesh;   Right       Comment:  Performed by Joshua Goldberg, MD at [facility] OR [address] [place] [address]    Social History    Socioeconomic History      Marital status:       Spouse name: Not on file      Number of children: Not on file      Years of education: Not on file      Highest education level: Not on file    Social Needs      Financial resource strain: Not on file      Food insecurity - worry: Not on file      Food insecurity - inability: Not on file      Transportation needs - medical: Not on file      Transportation needs - non-medical: Not on file    Occupational History      Not on file    Tobacco Use      Smoking status: Never Smoker      Smokeless tobacco: Never Used    Substance and Sexual Activity      Alcohol use: Yes        Alcohol/week: 0.0 oz        Comment: limited/sporadic      Drug use: No      Sexual activity: Not on file    Other Topics      Concerns:        Not on file    Social History Narrative      Not on file    Review of patient's family history indicates:  Problem: Heart disease      Relation: Mother          Age of Onset: (Not Specified)  Problem: Stroke      Relation: Mother          Age of Onset: (Not Specified)  Problem: Cancer      Relation: Father          Age of Onset: (Not Specified)  Problem: No Known Problems      Relation: Brother          Age of Onset: (Not Specified)  Problem: Melanoma      Relation: Neg Hx          Age of Onset: (Not Specified)             Review of Systems   Constitution: Negative.   HENT: Negative.    Eyes: Negative.    Cardiovascular: Negative for chest pain, dyspnea on exertion, leg swelling, near-syncope, palpitations and syncope.   Respiratory: Negative.  Negative for shortness of breath.    Endocrine: Negative.    Hematologic/Lymphatic: Negative.    Skin: Negative.    Musculoskeletal: Negative.    Gastrointestinal: Negative.    Genitourinary: Negative.    Neurological: Negative.  Negative for dizziness and light-headedness.   Psychiatric/Behavioral: Negative.     Allergic/Immunologic: Negative.         Objective:    Physical Exam   Constitutional: He is oriented to person, place, and time. He appears well-developed and well-nourished. No distress.   HENT:   Head: Normocephalic and atraumatic.   Eyes: EOM are normal. Pupils are equal, round, and reactive to light.   Neck: Normal range of motion. No JVD present. No thyromegaly present.   Cardiovascular: Normal rate, regular rhythm, S1 normal, S2 normal and normal heart sounds. PMI is not displaced. Exam reveals no gallop and no friction rub.   No murmur heard.  Pulmonary/Chest: Effort normal and breath sounds normal. No respiratory distress. He has no wheezes. He has no rales.   Abdominal: Soft. Bowel sounds are normal. He exhibits no distension. There is no tenderness. There is no rebound and no guarding.   Musculoskeletal: Normal range of motion. He exhibits no edema or tenderness.   Neurological: He is alert and oriented to person, place, and time. No cranial nerve deficit.   Skin: Skin is warm and dry. No rash noted. No erythema.   Psychiatric: He has a normal mood and affect. His behavior is normal. Judgment and thought content normal.   Vitals reviewed.    ECG: NSR nl UT, QRS, QTc  Exercise stress ECG: NSR with occasional RBBB/LAFB PVCs al low exertion; salvos of PMVT at higher levels of exertion        Assessment:       1. NSVT (nonsustained ventricular tachycardia)         Plan:       58 yoM here for NSVT seen on exercise stress test. He has mildly impaired EF with PMVT during exertion. I recommend [facility] for assessment of obstructive CAD. If normal coronary arteries, I would recommend beta blockade and limiting exertional heart rates to under 140 bpm. IC referral and RTC 6w

## 2019-01-10 NOTE — LETTER
January 10, 2019      Saturnino Hernandez MD  [address]  [place] [address]           [facility] - Arrhythmia  [address]  [place] [address]  Phone: [phone]  Fax: [phone]          Patient: Jovi Pacheco   MR Number: [id number]   YOB: 1960   Date of Visit: 1/10/2019       Dear Dr. Saturnino Hernandez:    Thank you for referring Jovi Pacheco to me for evaluation. Attached you will find relevant portions of my assessment and plan of care.    If you have questions, please do not hesitate to call me. I look forward to following Jovi Pacheco along with you.    Sincerely,    Rony Paz MD    Enclosure  CC:  No Recipients    If you would like to receive this communication electronically, please contact [email or web address] or [phone] to request more information on [employer] Link access.    For providers and/or their staff who would like to refer a patient to [facility], please contact us through our one-stop-shop provider referral line, [facility], at [phone].    If you feel you have received this communication in error or would no longer like to receive these types of communications, please e-mail [email or web address]

## 2019-01-11 ENCOUNTER — TELEPHONE (OUTPATIENT)
Dept: ELECTROPHYSIOLOGY | Facility: CLINIC | Age: 59
End: 2019-01-11

## 2019-01-11 DIAGNOSIS — I47.29 NSVT (NONSUSTAINED VENTRICULAR TACHYCARDIA): Primary | ICD-10-CM

## 2019-01-15 ENCOUNTER — INITIAL CONSULT (OUTPATIENT)
Dept: CARDIOLOGY | Facility: CLINIC | Age: 59
End: 2019-01-15
Payer: COMMERCIAL

## 2019-01-15 ENCOUNTER — LAB VISIT (OUTPATIENT)
Dept: LAB | Facility: HOSPITAL | Age: 59
End: 2019-01-15
Attending: INTERNAL MEDICINE
Payer: COMMERCIAL

## 2019-01-15 VITALS
WEIGHT: 192 LBS | HEART RATE: 69 BPM | HEIGHT: 73 IN | DIASTOLIC BLOOD PRESSURE: 85 MMHG | SYSTOLIC BLOOD PRESSURE: 130 MMHG | OXYGEN SATURATION: 98 % | BODY MASS INDEX: 25.45 KG/M2

## 2019-01-15 DIAGNOSIS — Z12.11 COLON CANCER SCREENING: ICD-10-CM

## 2019-01-15 DIAGNOSIS — I47.29 POLYMORPHIC VENTRICULAR TACHYCARDIA: Primary | ICD-10-CM

## 2019-01-15 PROCEDURE — 99999 PR PBB SHADOW E&M-EST. PATIENT-LVL III: CPT | Mod: PBBFAC,,, | Performed by: INTERNAL MEDICINE

## 2019-01-15 PROCEDURE — 3008F PR BODY MASS INDEX (BMI) DOCUMENTED: ICD-10-PCS | Mod: CPTII,S$GLB,, | Performed by: INTERNAL MEDICINE

## 2019-01-15 PROCEDURE — 3008F BODY MASS INDEX DOCD: CPT | Mod: CPTII,S$GLB,, | Performed by: INTERNAL MEDICINE

## 2019-01-15 PROCEDURE — 99204 PR OFFICE/OUTPT VISIT, NEW, LEVL IV, 45-59 MIN: ICD-10-PCS | Mod: S$GLB,,, | Performed by: INTERNAL MEDICINE

## 2019-01-15 PROCEDURE — 99999 PR PBB SHADOW E&M-EST. PATIENT-LVL III: ICD-10-PCS | Mod: PBBFAC,,, | Performed by: INTERNAL MEDICINE

## 2019-01-15 PROCEDURE — 99204 OFFICE O/P NEW MOD 45 MIN: CPT | Mod: S$GLB,,, | Performed by: INTERNAL MEDICINE

## 2019-01-15 PROCEDURE — 82274 ASSAY TEST FOR BLOOD FECAL: CPT

## 2019-01-15 NOTE — H&P (VIEW-ONLY)
Cardiology Clinic Note  Reason for Visit: PMVT    HPI:   It was my pleasure today to meet Mr Pacheco, he is a pleasant 58 y.o male with no significant PMH referred by Dr Paz for NSVT (PMVT) during stress test.     Patient is very active and he denied any chest pain and no SOB on exertion. He recently had STEPHIE as he was concerned due to family history of cardiac diseases as he mentioned (mother diet of heart attack at 90) and his STEPHIE showed he did 10 min and 11 sec, 18 mets and he stopped for fatigue. He had NSVT (PMVT) during test. He was referred to Dr PARVEEN Paz and he was referred to our clinic for possible angiogram.    Today patient is in good spirit and denied any acute complains.     ROS:    Constitution: Negative for fever or chills. Negative for weight loss or gain.   HENT: Negative for sore throat or headaches. Negative for rhinorrhea.  Eyes: Negative for blurred or double vision.   Cardiovascular: See above  Pulmonary: Negative for SOB. Negative for cough.   Gastrointestinal: Negative for abdominal pain. Negative for nausea/ vomiting. Negative for diarrhea.   : Negative for dysuria.   Skin: Negative for rashes.  Neurological: Negative for focal weakness or sensory changes.  Psychological: Negative for depression or anxiety.  PMH:     Past Medical History:   Diagnosis Date    Allergic rhinitis 12/17/2018    Family history of atherosclerosis     Keloid cicatrix     S/P right inguinal hernia repair with mesh 9/1/2017     Past Surgical History:   Procedure Laterality Date    EYE SURGERY      HERNIA REPAIR Right     inguinal    REPAIR-HERNIA-INGUINAL-INITIAL (5 YRS+) Open with Mesh Right 9/1/2017    Performed by Joshua Goldberg, MD at [facility] OR [address] [place] [address]     Allergies:     Review of patient's allergies indicates:   Allergen Reactions    Cat/feline products     Dog dander Other (See Comments)    Peanut      Medications:     Current Outpatient Medications on File Prior to Visit   Medication Sig  "Dispense Refill    aspirin (ECOTRIN) 81 MG EC tablet Take 81 mg by mouth once daily.      zolpidem (AMBIEN) 10 mg Tab Take 1 tablet (10 mg total) by mouth nightly as needed. 15 tablet 0     No current facility-administered medications on file prior to visit.      Social History:     Social History     Tobacco Use    Smoking status: Never Smoker    Smokeless tobacco: Never Used   Substance Use Topics    Alcohol use: Yes     Alcohol/week: 1.2 oz     Types: 1 Glasses of wine, 1 Cans of beer per week     Comment: limited/sporadic     Family History:     Family History   Problem Relation Age of Onset    Heart disease Mother     Stroke Mother     Cancer Father     No Known Problems Brother     Melanoma Neg Hx      Physical Exam:   /85 (BP Location: Right arm, Patient Position: Sitting, BP Method: Large (Automatic))   Pulse 69   Ht 6' 1" (1.854 m)   Wt 87.1 kg (192 lb 0.3 oz)   SpO2 98%   BMI 25.33 kg/m²      Constitutional: No apparent distress, conversant  HEENT: Sclera anicteric, extraocular movements intact  Neck: No jugular venous distension, no carotid bruits  CV: Regular rate and rhythm, no murmurs rubs or gallops, normal S1/S2  Pulm: Clear to auscultation bilaterally, no wheezes, rales, or ronchi  GI: Abdomen soft, nontender, nondistended, normoactive bowel sounds  Extremities: No lower extremity edema, warm with palpable pulses  Skin: No ecchymosis, erythema, or ulcers  Psych: Alert and oriented to person place location, appropriate affect  Neuro: No focal deficits    Labs:     Lab Results   Component Value Date     12/17/2018    K 4.9 12/17/2018     12/17/2018    CO2 23 12/17/2018    BUN 19 12/17/2018    CREATININE 1.0 12/17/2018    ANIONGAP 11 12/17/2018     Lab Results   Component Value Date    AST 32 12/17/2018    ALT 46 (H) 12/17/2018    ALKPHOS 53 (L) 12/17/2018    BILITOT 1.4 (H) 12/17/2018    ALBUMIN 4.1 12/17/2018     Lab Results   Component Value Date    CALCIUM 9.5 " 12/17/2018     No results found for: BNP, BNPTRIAGEBLO Lab Results   Component Value Date    WBC 9.56 12/17/2018    HGB 16.5 12/17/2018    HCT 50.3 12/17/2018     12/17/2018    GRAN 6.6 12/17/2018    GRAN 69.2 12/17/2018     No results found for: PTT, INR  Lab Results   Component Value Date    CHOL 204 (H) 12/17/2018    HDL 57 12/17/2018    LDLCALC 135.8 12/17/2018    TRIG 56 12/17/2018     Lab Results   Component Value Date    HGBA1C 6.0 02/11/2010     Lab Results   Component Value Date    TSH 0.467 12/17/2018            Assessment:   1. PMVT (NSVT) during STEPHIE.       Plan:     - We had extensive discussion about indication, benefit, risk and alternatives of angiogram with patient.  - Patient prefer to do CT cardiac or PET stress a he is hesitant to proceed with angiogram.   - Will order PET stress and will follow up on results.  - Will call patient with results of PET once available.     Attending addendum to follow     Vikram Montanez MD  Cardiology Fellow  Pager: [phone]        1/15/2019 8:38 AM    Follow-up:     Future Appointments   Date Time Provider Department Center   1/23/2019 12:00 PM LAB, [facility] LAB [place]   2/8/2019  9:45 AM JULIANNA UROLOGY [facility]   2/26/2019  7:30 AM EKG, APPT [facility] EKG [facility]   2/26/2019  8:20 AM Rony Paz MD [facility] ARRHYTH [facility]

## 2019-01-15 NOTE — LETTER
January 15, 2019      Rony Paz MD  [address]  [facility] [address]           [facility]  [address]  [facility] [address]  Phone: [phone]          Patient: Jovi Pacheco   MR Number: [id number]   YOB: 1960   Date of Visit: 1/15/2019       Dear Dr. Rony Paz:    Thank you for referring Jovi Pacheco to me for evaluation. Attached you will find relevant portions of my assessment and plan of care.    If you have questions, please do not hesitate to call me. I look forward to following Jovi Pacheco along with you.    Sincerely,    Hayden Barbosa MD    Enclosure  CC:  No Recipients    If you would like to receive this communication electronically, please contact [email or web address] or [phone] to request more information on [employer] Link access.    For providers and/or their staff who would like to refer a patient to [facility], please contact us through our one-stop-shop provider referral line, [facility], at [phone].    If you feel you have received this communication in error or would no longer like to receive these types of communications, please e-mail [email or web address]

## 2019-01-15 NOTE — PROGRESS NOTES
Cardiology Clinic Note  Reason for Visit: PMVT    HPI:   It was my pleasure today to meet Mr Pacheco, he is a pleasant 58 y.o male with no significant PMH referred by Dr Paz for NSVT (PMVT) during stress test.     Patient is very active and he denied any chest pain and no SOB on exertion. He recently had STEPHIE as he was concerned due to family history of cardiac diseases as he mentioned (mother diet of heart attack at 90) and his STEPHIE showed he did 10 min and 11 sec, 18 mets and he stopped for fatigue. He had NSVT (PMVT) during test. He was referred to Dr PARVEEN Paz and he was referred to our clinic for possible angiogram.    Today patient is in good spirit and denied any acute complains.     ROS:    Constitution: Negative for fever or chills. Negative for weight loss or gain.   HENT: Negative for sore throat or headaches. Negative for rhinorrhea.  Eyes: Negative for blurred or double vision.   Cardiovascular: See above  Pulmonary: Negative for SOB. Negative for cough.   Gastrointestinal: Negative for abdominal pain. Negative for nausea/ vomiting. Negative for diarrhea.   : Negative for dysuria.   Skin: Negative for rashes.  Neurological: Negative for focal weakness or sensory changes.  Psychological: Negative for depression or anxiety.  PMH:     Past Medical History:   Diagnosis Date    Allergic rhinitis 12/17/2018    Family history of atherosclerosis     Keloid cicatrix     S/P right inguinal hernia repair with mesh 9/1/2017     Past Surgical History:   Procedure Laterality Date    EYE SURGERY      HERNIA REPAIR Right     inguinal    REPAIR-HERNIA-INGUINAL-INITIAL (5 YRS+) Open with Mesh Right 9/1/2017    Performed by Joshua Goldberg, MD at [facility] OR [address] [place] [address]     Allergies:     Review of patient's allergies indicates:   Allergen Reactions    Cat/feline products     Dog dander Other (See Comments)    Peanut      Medications:     Current Outpatient Medications on File Prior to Visit   Medication Sig  "Dispense Refill    aspirin (ECOTRIN) 81 MG EC tablet Take 81 mg by mouth once daily.      zolpidem (AMBIEN) 10 mg Tab Take 1 tablet (10 mg total) by mouth nightly as needed. 15 tablet 0     No current facility-administered medications on file prior to visit.      Social History:     Social History     Tobacco Use    Smoking status: Never Smoker    Smokeless tobacco: Never Used   Substance Use Topics    Alcohol use: Yes     Alcohol/week: 1.2 oz     Types: 1 Glasses of wine, 1 Cans of beer per week     Comment: limited/sporadic     Family History:     Family History   Problem Relation Age of Onset    Heart disease Mother     Stroke Mother     Cancer Father     No Known Problems Brother     Melanoma Neg Hx      Physical Exam:   /85 (BP Location: Right arm, Patient Position: Sitting, BP Method: Large (Automatic))   Pulse 69   Ht 6' 1" (1.854 m)   Wt 87.1 kg (192 lb 0.3 oz)   SpO2 98%   BMI 25.33 kg/m²      Constitutional: No apparent distress, conversant  HEENT: Sclera anicteric, extraocular movements intact  Neck: No jugular venous distension, no carotid bruits  CV: Regular rate and rhythm, no murmurs rubs or gallops, normal S1/S2  Pulm: Clear to auscultation bilaterally, no wheezes, rales, or ronchi  GI: Abdomen soft, nontender, nondistended, normoactive bowel sounds  Extremities: No lower extremity edema, warm with palpable pulses  Skin: No ecchymosis, erythema, or ulcers  Psych: Alert and oriented to person place location, appropriate affect  Neuro: No focal deficits    Labs:     Lab Results   Component Value Date     12/17/2018    K 4.9 12/17/2018     12/17/2018    CO2 23 12/17/2018    BUN 19 12/17/2018    CREATININE 1.0 12/17/2018    ANIONGAP 11 12/17/2018     Lab Results   Component Value Date    AST 32 12/17/2018    ALT 46 (H) 12/17/2018    ALKPHOS 53 (L) 12/17/2018    BILITOT 1.4 (H) 12/17/2018    ALBUMIN 4.1 12/17/2018     Lab Results   Component Value Date    CALCIUM 9.5 " 12/17/2018     No results found for: BNP, BNPTRIAGEBLO Lab Results   Component Value Date    WBC 9.56 12/17/2018    HGB 16.5 12/17/2018    HCT 50.3 12/17/2018     12/17/2018    GRAN 6.6 12/17/2018    GRAN 69.2 12/17/2018     No results found for: PTT, INR  Lab Results   Component Value Date    CHOL 204 (H) 12/17/2018    HDL 57 12/17/2018    LDLCALC 135.8 12/17/2018    TRIG 56 12/17/2018     Lab Results   Component Value Date    HGBA1C 6.0 02/11/2010     Lab Results   Component Value Date    TSH 0.467 12/17/2018            Assessment:   1. PMVT (NSVT) during STEPHIE.       Plan:     - We had extensive discussion about indication, benefit, risk and alternatives of angiogram with patient.  - Patient prefer to do CT cardiac or PET stress a he is hesitant to proceed with angiogram.   - Will order PET stress and will follow up on results.  - Will call patient with results of PET once available.     Attending addendum to follow     Vikram Montanez MD  Cardiology Fellow  Pager: [phone]        1/15/2019 8:38 AM    Follow-up:     Future Appointments   Date Time Provider Department Center   1/23/2019 12:00 PM LAB, [facility] LAB [place]   2/8/2019  9:45 AM JULIANNA UROLOGY [facility]ST[facility]   2/26/2019  7:30 AM EKG, APPT [facility] EKG [place] [facility]   2/26/2019  8:20 AM Rony Paz MD [facility] ARRHYTH [place] [facility]

## 2019-01-17 LAB — HEMOCCULT STL QL IA: NEGATIVE

## 2019-01-24 ENCOUNTER — HOSPITAL ENCOUNTER (OUTPATIENT)
Facility: HOSPITAL | Age: 59
Discharge: HOME | End: 2019-01-24
Attending: COLON & RECTAL SURGERY | Admitting: COLON & RECTAL SURGERY
Payer: COMMERCIAL

## 2019-01-24 ENCOUNTER — ANESTHESIA EVENT (OUTPATIENT)
Dept: ENDOSCOPY | Facility: HOSPITAL | Age: 59
End: 2019-01-24
Payer: COMMERCIAL

## 2019-01-24 ENCOUNTER — ANESTHESIA (OUTPATIENT)
Dept: ENDOSCOPY | Facility: HOSPITAL | Age: 59
End: 2019-01-24
Payer: COMMERCIAL

## 2019-01-24 VITALS
OXYGEN SATURATION: 96 % | HEIGHT: 73 IN | BODY MASS INDEX: 23.99 KG/M2 | WEIGHT: 181 LBS | DIASTOLIC BLOOD PRESSURE: 61 MMHG | HEART RATE: 55 BPM | TEMPERATURE: 98 F | SYSTOLIC BLOOD PRESSURE: 108 MMHG | RESPIRATION RATE: 18 BRPM

## 2019-01-24 DIAGNOSIS — I47.29 NSVT (NONSUSTAINED VENTRICULAR TACHYCARDIA): ICD-10-CM

## 2019-01-24 DIAGNOSIS — Z12.11 SCREENING FOR COLON CANCER: ICD-10-CM

## 2019-01-24 DIAGNOSIS — Z86.010 HISTORY OF COLON POLYPS: Primary | ICD-10-CM

## 2019-01-24 PROBLEM — Z86.0100 HISTORY OF COLON POLYPS: Status: ACTIVE | Noted: 2019-01-24

## 2019-01-24 PROCEDURE — 88305 TISSUE EXAM BY PATHOLOGIST: CPT | Mod: 26,,, | Performed by: PATHOLOGY

## 2019-01-24 PROCEDURE — 88305 TISSUE SPECIMEN TO PATHOLOGY - SURGERY: ICD-10-PCS | Mod: 26,,, | Performed by: PATHOLOGY

## 2019-01-24 PROCEDURE — 45380 PR COLONOSCOPY,BIOPSY: ICD-10-PCS | Mod: 33,,, | Performed by: COLON & RECTAL SURGERY

## 2019-01-24 PROCEDURE — 25000003 PHARM REV CODE 250: Performed by: NURSE PRACTITIONER

## 2019-01-24 PROCEDURE — 37000009 HC ANESTHESIA EA ADD 15 MINS: Performed by: COLON & RECTAL SURGERY

## 2019-01-24 PROCEDURE — 27201012 HC FORCEPS, HOT/COLD, DISP: Performed by: COLON & RECTAL SURGERY

## 2019-01-24 PROCEDURE — E9220 PRA ENDO ANESTHESIA: ICD-10-PCS | Mod: 33,,, | Performed by: NURSE ANESTHETIST, CERTIFIED REGISTERED

## 2019-01-24 PROCEDURE — 45380 COLONOSCOPY AND BIOPSY: CPT | Mod: 33,,, | Performed by: COLON & RECTAL SURGERY

## 2019-01-24 PROCEDURE — E9220 PRA ENDO ANESTHESIA: HCPCS | Mod: 33,,, | Performed by: NURSE ANESTHETIST, CERTIFIED REGISTERED

## 2019-01-24 PROCEDURE — 88305 TISSUE EXAM BY PATHOLOGIST: CPT | Performed by: PATHOLOGY

## 2019-01-24 PROCEDURE — 63600175 PHARM REV CODE 636 W HCPCS: Performed by: NURSE ANESTHETIST, CERTIFIED REGISTERED

## 2019-01-24 PROCEDURE — 45380 COLONOSCOPY AND BIOPSY: CPT | Performed by: COLON & RECTAL SURGERY

## 2019-01-24 PROCEDURE — 37000008 HC ANESTHESIA 1ST 15 MINUTES: Performed by: COLON & RECTAL SURGERY

## 2019-01-24 RX ORDER — SODIUM CHLORIDE 0.9 % (FLUSH) 0.9 %
3 SYRINGE (ML) INJECTION
Status: DISCONTINUED | OUTPATIENT
Start: 2019-01-24 | End: 2019-01-24 | Stop reason: HOSPADM

## 2019-01-24 RX ORDER — SODIUM CHLORIDE 9 MG/ML
INJECTION, SOLUTION INTRAVENOUS CONTINUOUS
Status: DISCONTINUED | OUTPATIENT
Start: 2019-01-24 | End: 2019-01-24 | Stop reason: HOSPADM

## 2019-01-24 RX ORDER — PROPOFOL 10 MG/ML
VIAL (ML) INTRAVENOUS
Status: DISCONTINUED | OUTPATIENT
Start: 2019-01-24 | End: 2019-01-24

## 2019-01-24 RX ORDER — PROPOFOL 10 MG/ML
VIAL (ML) INTRAVENOUS CONTINUOUS PRN
Status: DISCONTINUED | OUTPATIENT
Start: 2019-01-24 | End: 2019-01-24

## 2019-01-24 RX ORDER — LIDOCAINE HCL/PF 100 MG/5ML
SYRINGE (ML) INTRAVENOUS
Status: DISCONTINUED | OUTPATIENT
Start: 2019-01-24 | End: 2019-01-24

## 2019-01-24 RX ADMIN — PROPOFOL 150 MCG/KG/MIN: 10 INJECTION, EMULSION INTRAVENOUS at 08:01

## 2019-01-24 RX ADMIN — SODIUM CHLORIDE: 0.9 INJECTION, SOLUTION INTRAVENOUS at 08:01

## 2019-01-24 RX ADMIN — LIDOCAINE HYDROCHLORIDE 40 MG: 20 INJECTION, SOLUTION INTRAVENOUS at 08:01

## 2019-01-24 RX ADMIN — PROPOFOL 50 MG: 10 INJECTION, EMULSION INTRAVENOUS at 08:01

## 2019-01-24 NOTE — H&P
Endoscopy H&P    Procedure : Colonoscopy      asymptomatic screening exam, personal history of colon polyps and most recent endoscopic exam 10 years ago with one colon polyp        Past Medical History:   Diagnosis Date    Allergic rhinitis 12/17/2018    Family history of atherosclerosis     Keloid cicatrix     S/P right inguinal hernia repair with mesh 9/1/2017         Review of patient's allergies indicates:   Allergen Reactions    Cat/feline products     Dog dander Other (See Comments)    Peanut          No current facility-administered medications on file prior to encounter.      Current Outpatient Medications on File Prior to Encounter   Medication Sig Dispense Refill    aspirin (ECOTRIN) 81 MG EC tablet Take 81 mg by mouth once daily.      zolpidem (AMBIEN) 10 mg Tab Take 1 tablet (10 mg total) by mouth nightly as needed. 15 tablet 0         Review of Systems -ROS:  GENERAL: No fever, chills, fatigability or weight loss.  CHEST: Denies RIVAS, cyanosis, wheezing, cough and sputum production.  CARDIOVASCULAR: Denies chest pain, PND, orthopnea or reduced exercise tolerance.   Musculoskeletal ROS: negative for - gait disturbance or joint pain  Neurological ROS: negative for - confusion or memory loss        Physical Exam:  General: well developed, well nourished, no distress  Head: normocephalic  Neck: supple, symmetrical, trachea midline  Lungs:  clear to auscultation bilaterally and normal respiratory effort  Heart: regular rate and rhythm, S1, S2 normal, no murmur, rub or gallop and regular rate and rhythm  Abdomen: soft, non-tender non-distented; bowel sounds normal; no masses,  no organomegaly  Extremities: no cyanosis or edema, or clubbing       Moderate Sedation (choice): Mallampati Score per anesthesia    ASA : II    IMP: asymptomatic screening exam, personal history of colon polyps and most recent endoscopic exam 10 years ago with one colon polyp    Plan: Colonoscopy with Moderate sedation.  I  have explained the procedure including indications, alternatives, expected outcomes and potential complications. The patient appears to understand and gives informed consent. The patient is medically ready for surgery.      Jolene Grayson MD

## 2019-01-24 NOTE — TRANSFER OF CARE
"Anesthesia Transfer of Care Note    Patient: Jovi Pacheco    Procedure(s) Performed: Procedure(s) (LRB):  COLONOSCOPY (N/A)    Patient location: PACU    Anesthesia Type: general    Transport from OR: Transported from OR on room air with adequate spontaneous ventilation    Post pain: adequate analgesia    Post assessment: no apparent anesthetic complications    Post vital signs: stable    Level of consciousness: awake and sedated    Nausea/Vomiting: no nausea/vomiting    Complications: none    Transfer of care protocol was followed      Last vitals:   Visit Vitals  /74 (BP Location: Left arm, Patient Position: Sitting)   Pulse 67   Temp 36.8 °C (98.2 °F) (Oral)   Resp 12   Ht 6' 1" (1.854 m)   Wt 82.1 kg (181 lb)   SpO2 98%   BMI 23.88 kg/m²     "

## 2019-01-24 NOTE — [employer]OVATION PATIENT INSTRUCTIONS
Discharge Summary/Instructions after an Endoscopic Procedure  Patient Name: Jovi Pacheco  Patient MRN: [id number]  Patient YOB: 1960 Thursday, January 24, 2019  Mekhi Montez MD  RESTRICTIONS:  During your procedure today, you received medications for sedation.  These   medications may affect your judgment, balance and coordination.  Therefore,   for 24 hours, you have the following restrictions:   - DO NOT drive a car, operate machinery, make legal/financial decisions,   sign important papers or drink alcohol.    ACTIVITY:  Today: no heavy lifting, straining or running due to procedural   sedation/anesthesia.  The following day: return to full activity including work.  DIET:  Eat and drink normally unless instructed otherwise.     TREATMENT FOR COMMON SIDE EFFECTS:  - Mild abdominal pain, nausea, belching, bloating or excessive gas:  rest,   eat lightly and use a heating pad.  - Sore Throat: treat with throat lozenges and/or gargle with warm salt   water.  - Because air was used during the procedure, expelling large amounts of air   from your rectum or belching is normal.  - If a bowel prep was taken, you may not have a bowel movement for 1-3 days.    This is normal.  SYMPTOMS TO WATCH FOR AND REPORT TO YOUR PHYSICIAN:  1. Abdominal pain or bloating, other than gas cramps.  2. Chest pain.  3. Back pain.  4. Signs of infection such as: chills or fever occurring within 24 hours   after the procedure.  5. Rectal bleeding, which would show as bright red, maroon, or black stools.   (A tablespoon of blood from the rectum is not serious, especially if   hemorrhoids are present.)  6. Vomiting.  7. Weakness or dizziness.  GO DIRECTLY TO THE NEAREST EMERGENCY ROOM IF YOU HAVE ANY OF THE FOLLOWING:      Difficulty breathing              Chills and/or fever over 101 F   Persistent vomiting and/or vomiting blood   Severe abdominal pain   Severe chest pain   Black, tarry stools   Bleeding- more than one  tablespoon   Any other symptom or condition that you feel may need urgent attention  Your doctor recommends these additional instructions:  If any biopsies were taken, your doctors clinic will contact you in 1 to 2   weeks with any results.  - Discharge patient to home (ambulatory).   - Resume previous diet.   - Continue present medications.   - Await pathology results.   - Repeat colonoscopy in 5-10 years for surveillance based on pathology   results.  For questions, problems or results please call your physician - Mekhi Montez MD at Work:  [phone].  [facility], EMERGENCY ROOM PHONE NUMBER: [phone]  IF A COMPLICATION OR EMERGENCY SITUATION ARISES AND YOU ARE UNABLE TO REACH   YOUR PHYSICIAN - GO DIRECTLY TO THE EMERGENCY ROOM.  Mekhi Montez MD  1/24/2019 8:23:19 AM  This report has been verified and signed electronically.  [employer]

## 2019-01-24 NOTE — ANESTHESIA PREPROCEDURE EVALUATION
01/24/2019  Jovi Pacheco is a 58 y.o., male.  Pre-operative evaluation for COLONOSCOPY (N/A)    Chief Complaint:screen    PMH:    Exercise stress ECG: NSR with occasional RBBB/LAFB PVCs al low exertion; salvos of PMVT at higher levels of exertion  Cardiologist recommended heart cath to rule out obstruction-and then beta block. PET ordered for future instead.    Recommends limiting exertion to keep HR less than 140.  No Beta blocker noted in meds.    Past Surgical History:   Procedure Laterality Date    EYE SURGERY      HERNIA REPAIR Right     inguinal    REPAIR-HERNIA-INGUINAL-INITIAL (5 YRS+) Open with Mesh Right 9/1/2017    Performed by Joshua Goldberg, MD at [facility] OR [address] [place] [address]         Vital Signs Range (Last 24H):         CBC:   No results for input(s): WBC, RBC, HGB, HCT, PLT, MCV, MCH, MCHC in the last 720 hours.    CMP: No results for input(s): NA, K, CL, CO2, BUN, CREATININE, GLU, MG, PHOS, CALCIUM, ALBUMIN, PROT, ALKPHOS, ALT, AST, BILITOT in the last 720 hours.    INR:  No results for input(s): PT, INR, PROTIME, APTT in the last 720 hours.      Diagnostic Studies:        ECG: NSR nl SC, QRS, QTc      2D Echo:  Anesthesia Evaluation    I have reviewed the Patient Summary Reports.     I have reviewed the Nursing Notes.   I have reviewed the Medications.     Review of Systems  Anesthesia Hx:  No problems with previous Anesthesia    Social:  Non-Smoker, No Alcohol Use    Cardiovascular:  Cardiovascular Normal     Pulmonary:  Pulmonary Normal    Neurological:  Neurology Normal        Physical Exam  General:  Well nourished    Airway/Jaw/Neck:  Airway Findings: Mouth Opening: Normal Tongue: Normal  General Airway Assessment: Average  Jaw/Neck Findings:  Neck ROM: Normal ROM      Dental:  Dental Findings:   Chest/Lungs:  Chest/Lungs Findings: Normal Respiratory Rate     Heart/Vascular:  Heart  Findings:       Mental Status:  Mental Status Findings:  Cooperative, Alert and Oriented         Anesthesia Plan  Type of Anesthesia, risks & benefits discussed:  Anesthesia Type:  general  Patient's Preference:   Intra-op Monitoring Plan: standard ASA monitors  Intra-op Monitoring Plan Comments:   Post Op Pain Control Plan:   Post Op Pain Control Plan Comments:   Induction:   IV  Beta Blocker:  Patient is not currently on a Beta-Blocker (No further documentation required).       Informed Consent: Patient understands risks and agrees with Anesthesia plan.  Questions answered. Anesthesia consent signed with patient.  ASA Score: 3     Day of Surgery Review of History & Physical:    H&P update referred to the surgeon.     Anesthesia Plan Notes: Discussed risk of cardiac complication at length with patient.  He is willing to accept risk of potential cardiac complication  And reports that he continues to exercise to extreme levels, feels that tachycardia is only induced with extreme exertion.  He want to proceed with screen.  We will abort procedure if tachycardia ensues.        Ready For Surgery From Anesthesia Perspective.

## 2019-01-24 NOTE — ANESTHESIA POSTPROCEDURE EVALUATION
"Anesthesia Post Evaluation    Patient: Jovi Pacheco    Procedure(s) Performed: Procedure(s) (LRB):  COLONOSCOPY (N/A)    Final Anesthesia Type: general  Patient location during evaluation: PACU  Patient participation: Yes- Able to Participate  Level of consciousness: awake and alert and oriented  Post-procedure vital signs: reviewed and stable  Pain management: adequate  Airway patency: patent  PONV status at discharge: No PONV  Anesthetic complications: no      Cardiovascular status: hemodynamically stable  Respiratory status: unassisted  Hydration status: euvolemic  Follow-up not needed.        Visit Vitals  /65 (BP Location: Left arm, Patient Position: Lying)   Pulse (!) 57   Temp 36.5 °C (97.7 °F) (Temporal)   Resp 18   Ht 6' 1" (1.854 m)   Wt 82.1 kg (181 lb)   SpO2 95%   BMI 23.88 kg/m²       Pain/Esperanza Score: Esperanza Score: 10 (1/24/2019  8:39 AM)        "

## 2019-01-25 ENCOUNTER — LAB VISIT (OUTPATIENT)
Dept: LAB | Facility: HOSPITAL | Age: 59
End: 2019-01-25
Attending: UROLOGY
Payer: COMMERCIAL

## 2019-01-25 DIAGNOSIS — R74.01 ALT (SGPT) LEVEL RAISED: ICD-10-CM

## 2019-01-25 DIAGNOSIS — R97.20 ELEVATED PROSTATE SPECIFIC ANTIGEN (PSA): ICD-10-CM

## 2019-01-25 LAB
ALT SERPL W/O P-5'-P-CCNC: 31 U/L
AST SERPL-CCNC: 23 U/L
COMPLEXED PSA SERPL-MCNC: 4.2 NG/ML
HCV AB SERPL QL IA: NEGATIVE

## 2019-01-25 PROCEDURE — 86803 HEPATITIS C AB TEST: CPT

## 2019-01-25 PROCEDURE — 84153 ASSAY OF PSA TOTAL: CPT

## 2019-01-25 PROCEDURE — 84460 ALANINE AMINO (ALT) (SGPT): CPT

## 2019-01-25 PROCEDURE — 36415 COLL VENOUS BLD VENIPUNCTURE: CPT | Mod: PO

## 2019-01-25 PROCEDURE — 84450 TRANSFERASE (AST) (SGOT): CPT

## 2019-01-28 ENCOUNTER — PATIENT MESSAGE (OUTPATIENT)
Dept: UROLOGY | Facility: CLINIC | Age: 59
End: 2019-01-28

## 2019-01-28 ENCOUNTER — TELEPHONE (OUTPATIENT)
Dept: UROLOGY | Facility: CLINIC | Age: 59
End: 2019-01-28

## 2019-01-29 NOTE — TELEPHONE ENCOUNTER
Pt notified of need for appt. To discuss elevated psa. appt scheduled. Pt confirmed date time and location.

## 2019-01-30 ENCOUNTER — OFFICE VISIT (OUTPATIENT)
Dept: UROLOGY | Facility: CLINIC | Age: 59
End: 2019-01-30
Payer: COMMERCIAL

## 2019-01-30 ENCOUNTER — TELEPHONE (OUTPATIENT)
Dept: CARDIOLOGY | Facility: CLINIC | Age: 59
End: 2019-01-30

## 2019-01-30 VITALS
HEART RATE: 62 BPM | SYSTOLIC BLOOD PRESSURE: 128 MMHG | WEIGHT: 190.5 LBS | DIASTOLIC BLOOD PRESSURE: 77 MMHG | BODY MASS INDEX: 25.25 KG/M2 | HEIGHT: 73 IN

## 2019-01-30 DIAGNOSIS — R97.20 ELEVATED PROSTATE SPECIFIC ANTIGEN (PSA): Primary | ICD-10-CM

## 2019-01-30 DIAGNOSIS — N40.1 BPH WITH URINARY OBSTRUCTION: ICD-10-CM

## 2019-01-30 DIAGNOSIS — N13.8 BPH WITH URINARY OBSTRUCTION: ICD-10-CM

## 2019-01-30 PROBLEM — Z86.0100 HISTORY OF COLON POLYPS: Status: RESOLVED | Noted: 2019-01-24 | Resolved: 2019-01-30

## 2019-01-30 PROBLEM — Z12.11 SCREENING FOR COLON CANCER: Status: RESOLVED | Noted: 2019-01-24 | Resolved: 2019-01-30

## 2019-01-30 PROBLEM — Z86.010 HISTORY OF COLON POLYPS: Status: RESOLVED | Noted: 2019-01-24 | Resolved: 2019-01-30

## 2019-01-30 PROCEDURE — 3008F PR BODY MASS INDEX (BMI) DOCUMENTED: ICD-10-PCS | Mod: CPTII,S$GLB,, | Performed by: UROLOGY

## 2019-01-30 PROCEDURE — 99214 PR OFFICE/OUTPT VISIT, EST, LEVL IV, 30-39 MIN: ICD-10-PCS | Mod: S$GLB,,, | Performed by: UROLOGY

## 2019-01-30 PROCEDURE — 99214 OFFICE O/P EST MOD 30 MIN: CPT | Mod: S$GLB,,, | Performed by: UROLOGY

## 2019-01-30 PROCEDURE — 99999 PR PBB SHADOW E&M-EST. PATIENT-LVL III: ICD-10-PCS | Mod: PBBFAC,,, | Performed by: UROLOGY

## 2019-01-30 PROCEDURE — 3008F BODY MASS INDEX DOCD: CPT | Mod: CPTII,S$GLB,, | Performed by: UROLOGY

## 2019-01-30 PROCEDURE — 99999 PR PBB SHADOW E&M-EST. PATIENT-LVL III: CPT | Mod: PBBFAC,,, | Performed by: UROLOGY

## 2019-01-30 RX ORDER — LIDOCAINE HYDROCHLORIDE 20 MG/ML
JELLY TOPICAL ONCE
Status: CANCELLED | OUTPATIENT
Start: 2019-01-30 | End: 2019-01-30

## 2019-01-30 RX ORDER — LIDOCAINE HYDROCHLORIDE 10 MG/ML
10 INJECTION INFILTRATION; PERINEURAL ONCE
Status: CANCELLED | OUTPATIENT
Start: 2019-01-30 | End: 2019-01-30

## 2019-01-30 RX ORDER — CIPROFLOXACIN 500 MG/1
TABLET ORAL
Qty: 4 TABLET | Refills: 0 | Status: ON HOLD | OUTPATIENT
Start: 2019-01-30 | End: 2019-02-14 | Stop reason: HOSPADM

## 2019-01-30 NOTE — H&P (VIEW-ONLY)
CHIEF COMPLAINT:    Mr. Pacheco is a 59 y.o. male presenting with an elevated PSA and LUTS.    PRESENTING ILLNESS:    Jovi Pacheco is a 59 y.o. male who c/o LUTS.  He has nocturia x 3, + feeling of incomplete emptying, + decreased FOS.  No hematuria.  No dysuria.  He'd like a procedure done as he doesn't like meds.    He has some mild ED, but it's not bothersome.    REVIEW OF SYSTEMS:    Jovi Pacheco denies headache, blurred vision, fever, nausea, vomiting, chills, abdominal pain, bleeding per rectum, cough, SOB, recent loss of consciousness, recent mental status changes, seizures, dizziness, or upper or lower extremity weakness.    FREDERICK  1. 3  2. 2  3. 2  4. 5  5. 4      PATIENT HISTORY:    Past Medical History:   Diagnosis Date    Allergic rhinitis 12/17/2018    Family history of atherosclerosis     History of colon polyps 1/24/2019    Keloid cicatrix     S/P right inguinal hernia repair with mesh 9/1/2017       Past Surgical History:   Procedure Laterality Date    COLONOSCOPY N/A 1/24/2019    Performed by Mekhi Montez MD at [facility] ENDO (4TH FLR)    EYE SURGERY      HERNIA REPAIR Right     inguinal    REPAIR-HERNIA-INGUINAL-INITIAL (5 YRS+) Open with Mesh Right 9/1/2017    Performed by Joshua Goldberg, MD at [facility] OR 2ND FLR       Family History   Problem Relation Age of Onset    Heart disease Mother     Stroke Mother     Cancer Father     No Known Problems Brother     Melanoma Neg Hx        Social History     Socioeconomic History    Marital status:      Spouse name: Not on file    Number of children: Not on file    Years of education: Not on file    Highest education level: Not on file   Social Needs    Financial resource strain: Not on file    Food insecurity - worry: Not on file    Food insecurity - inability: Not on file    Transportation needs - medical: Not on file    Transportation needs - non-medical: Not on file   Occupational History    Not on file    Tobacco Use    Smoking status: Never Smoker    Smokeless tobacco: Never Used   Substance and Sexual Activity    Alcohol use: Yes     Alcohol/week: 1.2 oz     Types: 1 Glasses of wine, 1 Cans of beer per week     Comment: limited/sporadic    Drug use: No    Sexual activity: Not on file   Other Topics Concern    Not on file   Social History Narrative    Not on file       Allergies:  Cat/feline products; Dog dander; and Peanut    Medications:    Current Outpatient Medications:     aspirin (ECOTRIN) 81 MG EC tablet, Take 81 mg by mouth once daily., Disp: , Rfl:     zolpidem (AMBIEN) 10 mg Tab, Take 1 tablet (10 mg total) by mouth nightly as needed., Disp: 15 tablet, Rfl: 0    PHYSICAL EXAMINATION:    The patient generally appears in good health, is appropriately interactive, and is in no apparent distress.     Eyes: anicteric sclerae, moist conjunctivae; no lid-lag; PERRLA     HENT: Atraumatic; oropharynx clear with moist mucous membranes and no mucosal ulcerations;normal hard and soft palate.  No evidence of lymphadenopathy.    Neck: Trachea midline.  No thyromegaly.    Musculoskeletal: No abnormal gait.    Skin: No lesions.    Mental: Cooperative with normal affect.  Is oriented to time, place, and person.    Neuro: Grossly intact.    Chest: Normal inspiratory effort.   No accessory muscles.  No audible wheezes.  Respirations symmetric on inspiration and expiration.    Heart: Regular rhythm.      Abdomen:  Soft, non-tender. No masses or organomegaly. Bladder is not palpable. No evidence of flank discomfort. No evidence of inguinal hernia.    Genitourinary: The penis is not circumcised with no evidence of plaques or induration. The urethral meatus is normal. The testes, epididymides, and cord structures are normal in size and contour bilaterally. The scrotum is normal in size and contour.    Normal anal sphincter tone. No rectal mass.    The prostate is 45 g. Normal landmarks. Lateral sulci. Median furrow  intact.  No nodularity or induration. Seminal vesicles are normal.    Extremities: No clubbing, cyanosis, or edema      LABS:    UA dipped negative today  Lab Results   Component Value Date    PSA 4.6 (H) 12/17/2018    PSA 2.6 02/11/2010    PSADIAG 4.2 (H) 01/25/2019    PSATOTAL 2.5 02/15/2010    PSAFREE 0.27 02/15/2010    PSAFREEPCT 10.80 02/15/2010       IMPRESSION:    Encounter Diagnoses   Name Primary?    Elevated prostate specific antigen (PSA) Yes    BPH with urinary obstruction          PLAN:    1. Will cysto to see if he is a candidate for rezum.   2. Discussed risks/benefits of Rezum.  Discussed bleeding, frequency, failure amongst other risks.  Also discussed very small risk of EjD and ED.  He was given the chance to ask questions.  Alternatives discussed.    3. Discussed that his PSA is elevated.  Recommend TRUS bx. Risks and benefits of TRUS bx discussed in detail today.  We discussed bleeding, pain, and infection.  Alternatives discussed.  He was given the chance to ask quesitons.  A new Rx for Cipro was given for prophylaxis.     Copy to:

## 2019-01-30 NOTE — H&P (VIEW-ONLY)
CHIEF COMPLAINT:    Mr. Pacheco is a 59 y.o. male presenting with an elevated PSA and LUTS.    PRESENTING ILLNESS:    Jovi Pacheco is a 59 y.o. male who c/o LUTS.  He has nocturia x 3, + feeling of incomplete emptying, + decreased FOS.  No hematuria.  No dysuria.  He'd like a procedure done as he doesn't like meds.    He has some mild ED, but it's not bothersome.    REVIEW OF SYSTEMS:    Jovi Pacheco denies headache, blurred vision, fever, nausea, vomiting, chills, abdominal pain, bleeding per rectum, cough, SOB, recent loss of consciousness, recent mental status changes, seizures, dizziness, or upper or lower extremity weakness.    FREDERICK  1. 3  2. 2  3. 2  4. 5  5. 4      PATIENT HISTORY:    Past Medical History:   Diagnosis Date    Allergic rhinitis 12/17/2018    Family history of atherosclerosis     History of colon polyps 1/24/2019    Keloid cicatrix     S/P right inguinal hernia repair with mesh 9/1/2017       Past Surgical History:   Procedure Laterality Date    COLONOSCOPY N/A 1/24/2019    Performed by Mekhi Montez MD at [facility] ENDO (4TH FLR)    EYE SURGERY      HERNIA REPAIR Right     inguinal    REPAIR-HERNIA-INGUINAL-INITIAL (5 YRS+) Open with Mesh Right 9/1/2017    Performed by Joshua Goldberg, MD at [facility] OR 2ND FLR       Family History   Problem Relation Age of Onset    Heart disease Mother     Stroke Mother     Cancer Father     No Known Problems Brother     Melanoma Neg Hx        Social History     Socioeconomic History    Marital status:      Spouse name: Not on file    Number of children: Not on file    Years of education: Not on file    Highest education level: Not on file   Social Needs    Financial resource strain: Not on file    Food insecurity - worry: Not on file    Food insecurity - inability: Not on file    Transportation needs - medical: Not on file    Transportation needs - non-medical: Not on file   Occupational History    Not on file    Tobacco Use    Smoking status: Never Smoker    Smokeless tobacco: Never Used   Substance and Sexual Activity    Alcohol use: Yes     Alcohol/week: 1.2 oz     Types: 1 Glasses of wine, 1 Cans of beer per week     Comment: limited/sporadic    Drug use: No    Sexual activity: Not on file   Other Topics Concern    Not on file   Social History Narrative    Not on file       Allergies:  Cat/feline products; Dog dander; and Peanut    Medications:    Current Outpatient Medications:     aspirin (ECOTRIN) 81 MG EC tablet, Take 81 mg by mouth once daily., Disp: , Rfl:     zolpidem (AMBIEN) 10 mg Tab, Take 1 tablet (10 mg total) by mouth nightly as needed., Disp: 15 tablet, Rfl: 0    PHYSICAL EXAMINATION:    The patient generally appears in good health, is appropriately interactive, and is in no apparent distress.     Eyes: anicteric sclerae, moist conjunctivae; no lid-lag; PERRLA     HENT: Atraumatic; oropharynx clear with moist mucous membranes and no mucosal ulcerations;normal hard and soft palate.  No evidence of lymphadenopathy.    Neck: Trachea midline.  No thyromegaly.    Musculoskeletal: No abnormal gait.    Skin: No lesions.    Mental: Cooperative with normal affect.  Is oriented to time, place, and person.    Neuro: Grossly intact.    Chest: Normal inspiratory effort.   No accessory muscles.  No audible wheezes.  Respirations symmetric on inspiration and expiration.    Heart: Regular rhythm.      Abdomen:  Soft, non-tender. No masses or organomegaly. Bladder is not palpable. No evidence of flank discomfort. No evidence of inguinal hernia.    Genitourinary: The penis is not circumcised with no evidence of plaques or induration. The urethral meatus is normal. The testes, epididymides, and cord structures are normal in size and contour bilaterally. The scrotum is normal in size and contour.    Normal anal sphincter tone. No rectal mass.    The prostate is 45 g. Normal landmarks. Lateral sulci. Median furrow  intact.  No nodularity or induration. Seminal vesicles are normal.    Extremities: No clubbing, cyanosis, or edema      LABS:    UA dipped negative today  Lab Results   Component Value Date    PSA 4.6 (H) 12/17/2018    PSA 2.6 02/11/2010    PSADIAG 4.2 (H) 01/25/2019    PSATOTAL 2.5 02/15/2010    PSAFREE 0.27 02/15/2010    PSAFREEPCT 10.80 02/15/2010       IMPRESSION:    Encounter Diagnoses   Name Primary?    Elevated prostate specific antigen (PSA) Yes    BPH with urinary obstruction          PLAN:    1. Will cysto to see if he is a candidate for rezum.   2. Discussed risks/benefits of Rezum.  Discussed bleeding, frequency, failure amongst other risks.  Also discussed very small risk of EjD and ED.  He was given the chance to ask questions.  Alternatives discussed.    3. Discussed that his PSA is elevated.  Recommend TRUS bx. Risks and benefits of TRUS bx discussed in detail today.  We discussed bleeding, pain, and infection.  Alternatives discussed.  He was given the chance to ask quesitons.  A new Rx for Cipro was given for prophylaxis.     Copy to:

## 2019-01-30 NOTE — PROGRESS NOTES
CHIEF COMPLAINT:    Mr. Pacheco is a 59 y.o. male presenting with an elevated PSA and LUTS.    PRESENTING ILLNESS:    Jovi Pacheco is a 59 y.o. male who c/o LUTS.  He has nocturia x 3, + feeling of incomplete emptying, + decreased FOS.  No hematuria.  No dysuria.  He'd like a procedure done as he doesn't like meds.    He has some mild ED, but it's not bothersome.    REVIEW OF SYSTEMS:    Jovi Pacheco denies headache, blurred vision, fever, nausea, vomiting, chills, abdominal pain, bleeding per rectum, cough, SOB, recent loss of consciousness, recent mental status changes, seizures, dizziness, or upper or lower extremity weakness.    FREDERICK  1. 3  2. 2  3. 2  4. 5  5. 4      PATIENT HISTORY:    Past Medical History:   Diagnosis Date    Allergic rhinitis 12/17/2018    Family history of atherosclerosis     History of colon polyps 1/24/2019    Keloid cicatrix     S/P right inguinal hernia repair with mesh 9/1/2017       Past Surgical History:   Procedure Laterality Date    COLONOSCOPY N/A 1/24/2019    Performed by Mekhi Montez MD at [facility] ENDO (4TH FLR)    EYE SURGERY      HERNIA REPAIR Right     inguinal    REPAIR-HERNIA-INGUINAL-INITIAL (5 YRS+) Open with Mesh Right 9/1/2017    Performed by Joshua Goldberg, MD at [facility] OR 2ND FLR       Family History   Problem Relation Age of Onset    Heart disease Mother     Stroke Mother     Cancer Father     No Known Problems Brother     Melanoma Neg Hx        Social History     Socioeconomic History    Marital status:      Spouse name: Not on file    Number of children: Not on file    Years of education: Not on file    Highest education level: Not on file   Social Needs    Financial resource strain: Not on file    Food insecurity - worry: Not on file    Food insecurity - inability: Not on file    Transportation needs - medical: Not on file    Transportation needs - non-medical: Not on file   Occupational History    Not on file    Tobacco Use    Smoking status: Never Smoker    Smokeless tobacco: Never Used   Substance and Sexual Activity    Alcohol use: Yes     Alcohol/week: 1.2 oz     Types: 1 Glasses of wine, 1 Cans of beer per week     Comment: limited/sporadic    Drug use: No    Sexual activity: Not on file   Other Topics Concern    Not on file   Social History Narrative    Not on file       Allergies:  Cat/feline products; Dog dander; and Peanut    Medications:    Current Outpatient Medications:     aspirin (ECOTRIN) 81 MG EC tablet, Take 81 mg by mouth once daily., Disp: , Rfl:     zolpidem (AMBIEN) 10 mg Tab, Take 1 tablet (10 mg total) by mouth nightly as needed., Disp: 15 tablet, Rfl: 0    PHYSICAL EXAMINATION:    The patient generally appears in good health, is appropriately interactive, and is in no apparent distress.     Eyes: anicteric sclerae, moist conjunctivae; no lid-lag; PERRLA     HENT: Atraumatic; oropharynx clear with moist mucous membranes and no mucosal ulcerations;normal hard and soft palate.  No evidence of lymphadenopathy.    Neck: Trachea midline.  No thyromegaly.    Musculoskeletal: No abnormal gait.    Skin: No lesions.    Mental: Cooperative with normal affect.  Is oriented to time, place, and person.    Neuro: Grossly intact.    Chest: Normal inspiratory effort.   No accessory muscles.  No audible wheezes.  Respirations symmetric on inspiration and expiration.    Heart: Regular rhythm.      Abdomen:  Soft, non-tender. No masses or organomegaly. Bladder is not palpable. No evidence of flank discomfort. No evidence of inguinal hernia.    Genitourinary: The penis is not circumcised with no evidence of plaques or induration. The urethral meatus is normal. The testes, epididymides, and cord structures are normal in size and contour bilaterally. The scrotum is normal in size and contour.    Normal anal sphincter tone. No rectal mass.    The prostate is 45 g. Normal landmarks. Lateral sulci. Median furrow  intact.  No nodularity or induration. Seminal vesicles are normal.    Extremities: No clubbing, cyanosis, or edema      LABS:    UA dipped negative today  Lab Results   Component Value Date    PSA 4.6 (H) 12/17/2018    PSA 2.6 02/11/2010    PSADIAG 4.2 (H) 01/25/2019    PSATOTAL 2.5 02/15/2010    PSAFREE 0.27 02/15/2010    PSAFREEPCT 10.80 02/15/2010       IMPRESSION:    Encounter Diagnoses   Name Primary?    Elevated prostate specific antigen (PSA) Yes    BPH with urinary obstruction          PLAN:    1. Will cysto to see if he is a candidate for rezum.   2. Discussed risks/benefits of Rezum.  Discussed bleeding, frequency, failure amongst other risks.  Also discussed very small risk of EjD and ED.  He was given the chance to ask questions.  Alternatives discussed.    3. Discussed that his PSA is elevated.  Recommend TRUS bx. Risks and benefits of TRUS bx discussed in detail today.  We discussed bleeding, pain, and infection.  Alternatives discussed.  He was given the chance to ask quesitons.  A new Rx for Cipro was given for prophylaxis.     Copy to:

## 2019-01-30 NOTE — PATIENT INSTRUCTIONS
Prostate Biopsy  Cancer occurs when abnormal cells form a tumor (a lump of cells that grow uncontrolled). If the results of your exam and tests lead your doctor to suspect prostate cancer, a core needle biopsy will be done. A thin needle is used to remove small samples of prostate tissue. These samples are checked for cancer.    Taking Tissue Samples  A biopsy takes about 15 to 20 minutes. Before it starts, you may be given an enema or suppository to clear the bowels. Antibiotics are given at least one hour pior to the biopsy. During the procedure:  · You will be given antibiotics to prevent infection.  · You may be given a sedative, local anesthetic, or pain medication.  · A small probe is inserted into the rectum as you lie on your side. An image of your prostate can then be seen on a video monitor. This is called a transrectal ultrasound (TRUS).  · With the TRUS image as a guide, your doctor uses a thin needle to remove tiny tissue samples from several sites in the prostate.  Risks and Complications of Core Needle Biopsy   · Infection  · Blood in urine, stool, or semen       © 2000-2011 [facility], [address] [place] [address], [place] [address]. All rights reserved. This information is not intended as a substitute for professional medical care. Always follow your healthcare professional's instructions.

## 2019-01-31 ENCOUNTER — PATIENT MESSAGE (OUTPATIENT)
Dept: CARDIOLOGY | Facility: CLINIC | Age: 59
End: 2019-01-31

## 2019-01-31 ENCOUNTER — CLINICAL SUPPORT (OUTPATIENT)
Dept: CARDIOLOGY | Facility: CLINIC | Age: 59
End: 2019-01-31
Attending: STUDENT IN AN ORGANIZED HEALTH CARE EDUCATION/TRAINING PROGRAM
Payer: COMMERCIAL

## 2019-01-31 ENCOUNTER — DOCUMENTATION ONLY (OUTPATIENT)
Dept: CARDIOLOGY | Facility: CLINIC | Age: 59
End: 2019-01-31

## 2019-01-31 ENCOUNTER — TELEPHONE (OUTPATIENT)
Dept: ENDOSCOPY | Facility: HOSPITAL | Age: 59
End: 2019-01-31

## 2019-01-31 VITALS — DIASTOLIC BLOOD PRESSURE: 72 MMHG | HEART RATE: 75 BPM | SYSTOLIC BLOOD PRESSURE: 125 MMHG

## 2019-01-31 DIAGNOSIS — R94.39 POSITIVE CARDIAC STRESS TEST: Primary | ICD-10-CM

## 2019-01-31 DIAGNOSIS — I47.29 POLYMORPHIC VENTRICULAR TACHYCARDIA: ICD-10-CM

## 2019-01-31 LAB
CV STRESS BASE HR: 59
DIASTOLIC BLOOD PRESSURE: 81
END DIASTOLIC INDEX-HIGH: 170 ML/M2
END SYSTOLIC INDEX-HIGH: 70 ML/M2
NUC REST DIASTOLIC VOLUME INDEX: 78
NUC REST EJECTION FRACTION: 46
NUC REST SYSTOLIC VOLUME INDEX: 42
NUC STRESS DIASTOLIC VOLUME INDEX: 102
NUC STRESS EJECTION FRACTION: 56 %
NUC STRESS SYSTOLIC VOLUME INDEX: 45
OHS CV CPX 85 PERCENT MAX PREDICTED HEART RATE MALE: 135
OHS CV CPX MAX PREDICTED HEART RATE: 159
OHS CV CPX PATIENT IS FEMALE: 0
OHS CV CPX PATIENT IS MALE: 1
OHS CV CPX PEAK DIASTOLIC BLOOD PRESSURE: 69 MMHG
OHS CV CPX PEAK HEAR RATE: 82
OHS CV CPX PEAK RATE PRESSURE PRODUCT: 9184
OHS CV CPX PEAK SYSTOLIC BLOOD PRESSURE: 112
OHS CV CPX PERCENT MAX PREDICTED HEART RATE ACHIEVED: 52
OHS CV CPX RATE PRESSURE PRODUCT PRESENTING: 6962
RETIRED EF AND QEF - SEE NOTES: 51 %
SYSTOLIC BLOOD PRESSURE: 118

## 2019-01-31 PROCEDURE — A9555 RB82 RUBIDIUM: HCPCS | Mod: S$GLB,ICN,, | Performed by: INTERNAL MEDICINE

## 2019-01-31 PROCEDURE — 99999 PR PBB SHADOW E&M-EST. PATIENT-LVL II: ICD-10-PCS | Mod: PBBFAC,,,

## 2019-01-31 PROCEDURE — A9555 PR RB82 RUBIDIUM: ICD-10-PCS | Mod: S$GLB,ICN,, | Performed by: INTERNAL MEDICINE

## 2019-01-31 PROCEDURE — 78492 PET STRESS (CUPID ONLY): ICD-10-PCS | Mod: S$GLB,,, | Performed by: INTERNAL MEDICINE

## 2019-01-31 PROCEDURE — 78492 MYOCRD IMG PET MLT RST&STRS: CPT | Mod: S$GLB,,, | Performed by: INTERNAL MEDICINE

## 2019-01-31 PROCEDURE — 99999 PR PBB SHADOW E&M-EST. PATIENT-LVL II: CPT | Mod: PBBFAC,,,

## 2019-01-31 RX ORDER — DIPHENHYDRAMINE HCL 25 MG
50 CAPSULE ORAL ONCE
Status: CANCELLED | OUTPATIENT
Start: 2019-01-31 | End: 2019-01-31

## 2019-01-31 RX ORDER — DIPYRIDAMOLE 5 MG/ML
48.36 INJECTION INTRAVENOUS
Status: COMPLETED | OUTPATIENT
Start: 2019-01-31 | End: 2019-01-31

## 2019-01-31 RX ORDER — SODIUM CHLORIDE 9 MG/ML
3 INJECTION, SOLUTION INTRAVENOUS CONTINUOUS
Status: CANCELLED | OUTPATIENT
Start: 2019-01-31 | End: 2019-01-31

## 2019-01-31 RX ORDER — CLOPIDOGREL BISULFATE 75 MG/1
75 TABLET ORAL DAILY
Qty: 5 TABLET | Refills: 0 | Status: ON HOLD | OUTPATIENT
Start: 2019-01-31 | End: 2019-02-14 | Stop reason: HOSPADM

## 2019-01-31 RX ADMIN — DIPYRIDAMOLE 48.35 MG: 5 INJECTION INTRAVENOUS at 08:01

## 2019-01-31 NOTE — PROGRESS NOTES
OUTPATIENT CATHETERIZATION INSTRUCTIONS    You have been scheduled for a procedure in the catheterization lab on Thursday, February 14, 2019.     Please report to the Cardiology Waiting Area on the Third floor of the hospital and check in at 9 AM.   You will then be taken to the SSCU (Short Stay Cardiac Unit) and prepared for your procedure. Please be aware that this is not the time of your procedure but the time you are to arrive. The procedures are scheduled on an hourly basis; however, emergency cases take precedence over all other cases.       You may not have anything to eat or drink after midnight the night before your test. You may take your regular morning medications with water. If there are any medications that you should not take you will be instructed to hold them that morning. If you are diabetic and on Metformin (Glucophage) do not take it the day before, the day of, and for 2 days after your procedure.      The procedure will take 1-2 hours to perform. After the procedure, you will return to SSCU on the third floor of the hospital. You will need to lie still (or keep your arm still) for the next 4 to 6 hours to minimize bleeding from the puncture site. Your family may remain in the room with you during this time.       You may be able to be discharged home that same afternoon if there is someone to drive you home and there were no complications. If you have one of the balloon, stent, or device procedures you may spend the night in the hospital. Your doctor will determine, based on your progress, the date and time of your discharge. The results of your procedure will be discussed with you before you are discharged. Any further testing or procedures will be scheduled for you either before you leave or you will be called with these appointments.       If you should have any questions, concerns, or need to change the date of your procedure, please call ANTWON Levy @ [phone]    Special  Instructions:    Take 4 Plavix pills the day before your procedure and 1 pill the morning of your procedure before coming to the hospital.              THE ABOVE INSTRUCTIONS WERE GIVEN TO THE PATIENT VERBALLY AND THEY VERBALIZED UNDERSTANDING.  THEY DO NOT REQUIRE ANY SPECIAL NEEDS AND DO NOT HAVE ANY LEARNING BARRIERS.          Directions for Reporting to Cardiology Waiting Area in the Hospital  If you park in the Parking Garage:  Take elevators to the1st floor of the parking garage.  Continue past the gift shop, coffee shop, and piano.  Take a right and go to the gold elevators. (Elevator B)  Take the elevator to the 3rd floor.  Follow the arrow on the sign on the wall that says Cath Lab Registration/EP Lab Registration.  Follow the long hallway all the way around until you come to a big open area.  This is the registration area.  Check in at Reception Desk.    OR    If family is dropping you off:  Have them drop you off at the front of the Hospital under the green overhang.  Enter through the doors and take a right.  Take the E elevators to the 3rd floor Cardiology Waiting Area.  Check in at the Reception Desk in the waiting room.

## 2019-01-31 NOTE — PROGRESS NOTES
Discussed results of PET with patient and [facility]:    - Continue aspirin.  - Load with Plavix 300 day before, then 75 day of procedure.  - Discussed with Dr Barbosa.       Vikram Montanez MD  Cardiology Fellow  Pager: [phone]

## 2019-02-08 ENCOUNTER — HOSPITAL ENCOUNTER (OUTPATIENT)
Dept: UROLOGY | Facility: HOSPITAL | Age: 59
Discharge: HOME | End: 2019-02-08
Attending: UROLOGY
Payer: COMMERCIAL

## 2019-02-08 ENCOUNTER — DOCUMENTATION ONLY (OUTPATIENT)
Dept: UROLOGY | Facility: HOSPITAL | Age: 59
End: 2019-02-08

## 2019-02-08 VITALS
HEIGHT: 73 IN | WEIGHT: 186.75 LBS | SYSTOLIC BLOOD PRESSURE: 132 MMHG | RESPIRATION RATE: 16 BRPM | HEART RATE: 65 BPM | DIASTOLIC BLOOD PRESSURE: 79 MMHG | BODY MASS INDEX: 24.75 KG/M2 | TEMPERATURE: 98 F

## 2019-02-08 DIAGNOSIS — N40.1 BPH WITH URINARY OBSTRUCTION: ICD-10-CM

## 2019-02-08 DIAGNOSIS — R97.20 ELEVATED PROSTATE SPECIFIC ANTIGEN (PSA): ICD-10-CM

## 2019-02-08 DIAGNOSIS — N13.8 BPH WITH URINARY OBSTRUCTION: ICD-10-CM

## 2019-02-08 PROCEDURE — 55700 PR BIOPSY OF PROSTATE,NEEDLE/PUNCH: ICD-10-PCS | Mod: ,,, | Performed by: UROLOGY

## 2019-02-08 PROCEDURE — 55700 PR BIOPSY OF PROSTATE,NEEDLE/PUNCH: CPT | Mod: ,,, | Performed by: UROLOGY

## 2019-02-08 PROCEDURE — 88305 TISSUE SPECIMEN TO PATHOLOGY, UROLOGY: ICD-10-PCS | Mod: 26,,, | Performed by: PATHOLOGY

## 2019-02-08 PROCEDURE — 76942 PR U/S GUIDANCE FOR NEEDLE GUIDANCE: ICD-10-PCS | Mod: 26,59,, | Performed by: UROLOGY

## 2019-02-08 PROCEDURE — 52000 PR CYSTOURETHROSCOPY: ICD-10-PCS | Mod: 59,,, | Performed by: UROLOGY

## 2019-02-08 PROCEDURE — 76872 US TRANSRECTAL: CPT

## 2019-02-08 PROCEDURE — 55700 HC BIOPSY OF PROSTATE - NEEDLE OR PUNCH: CPT

## 2019-02-08 PROCEDURE — 88305 TISSUE EXAM BY PATHOLOGIST: CPT | Performed by: PATHOLOGY

## 2019-02-08 PROCEDURE — 76872 US TRANSRECTAL: CPT | Mod: 26,,, | Performed by: UROLOGY

## 2019-02-08 PROCEDURE — 52000 CYSTOURETHROSCOPY: CPT | Mod: 59,,, | Performed by: UROLOGY

## 2019-02-08 PROCEDURE — 88305 TISSUE EXAM BY PATHOLOGIST: CPT | Mod: 26,,, | Performed by: PATHOLOGY

## 2019-02-08 PROCEDURE — 76942 ECHO GUIDE FOR BIOPSY: CPT | Mod: 59

## 2019-02-08 PROCEDURE — 76872 PR US TRANSRECTAL: ICD-10-PCS | Mod: 26,,, | Performed by: UROLOGY

## 2019-02-08 PROCEDURE — 76942 ECHO GUIDE FOR BIOPSY: CPT | Mod: 26,59,, | Performed by: UROLOGY

## 2019-02-08 PROCEDURE — 52000 CYSTOURETHROSCOPY: CPT

## 2019-02-08 RX ORDER — LIDOCAINE HYDROCHLORIDE 10 MG/ML
10 INJECTION INFILTRATION; PERINEURAL ONCE
Status: COMPLETED | OUTPATIENT
Start: 2019-02-08 | End: 2019-02-08

## 2019-02-08 RX ORDER — LIDOCAINE HYDROCHLORIDE 20 MG/ML
JELLY TOPICAL ONCE
Status: COMPLETED | OUTPATIENT
Start: 2019-02-08 | End: 2019-02-08

## 2019-02-08 RX ADMIN — LIDOCAINE HYDROCHLORIDE 10 ML: 10 INJECTION INFILTRATION; PERINEURAL at 09:02

## 2019-02-08 RX ADMIN — LIDOCAINE HYDROCHLORIDE: 20 JELLY TOPICAL at 09:02

## 2019-02-08 NOTE — PATIENT INSTRUCTIONS
What to Expect After a Cystoscopy  For the next 24-48 hours, you may feel a mild burning when you urinate. This burning is normal and expected. Drink plenty of water to dilute the urine to help relieve the burning sensation. You may also see a small amount of blood in your urine after the procedure.    Unless you are already taking antibiotics, you may be given an antibiotic after the test to prevent infection.    Signs and Symptoms to Report  Call the [facility] at [phone] if you develop any of the following:  · Fever of 101 degrees or higher  · Chills or persistent bleeding  Inability to urinateWhat to Expect After a Prostate Biopsy    Please be sure to finish your pre-procedure antibiotics as instructed.    You may have mild bleeding from the rectum or urine for about 1 week to 1 month, or in your ejaculate for several months. This bleeding is normal and expected, and it will stop. You may have mild discomfort in your rectal or urethral area for 24-48 hours.    You cannot do any strenuous lifting, straining, or exercising for 24 hours. You may return to full activity the day after the biopsy.    You may continue to take all your regular medications after the procedure except for the blood thinners.    You may resume all blood-thinning medications once you no longer see any bleeding or whenever your physician prescribing the medication says it is all right to do so. You may take Tylenol if you have a fever and your temperature is less than 100° F or if you have some discomfort.    You will receive a call from the Urology Department at [facility] with the results of your prostate biopsy within one week.    Signs and Symptoms to Report    Call your [facility] urologist at [phone] if you develop any of the following:  · Temperature greater than 101°  F  · Inability to urinate  · A large amount of bleeding from the rectum or in the urine  · Persistent or severe pain    After hours or on weekends, you  may reach a urology resident on call at this number: [phone].What to Expect After a Prostate Biopsy    Please be sure to finish your pre-procedure antibiotics as instructed.    You may have mild bleeding from the rectum or urine for about 1 week to 1 month, or in your ejaculate for several months. This bleeding is normal and expected, and it will stop. You may have mild discomfort in your rectal or urethral area for 24-48 hours.    You cannot do any strenuous lifting, straining, or exercising for 24 hours. You may return to full activity the day after the biopsy.    You may continue to take all your regular medications after the procedure except for the blood thinners.    You may resume all blood-thinning medications once you no longer see any bleeding or whenever your physician prescribing the medication says it is all right to do so. You may take Tylenol if you have a fever and your temperature is less than 100° F or if you have some discomfort.    You will receive a call from the Urology Department at [facility] with the results of your prostate biopsy within one week.    Signs and Symptoms to Report    Call your [facility] urologist at [phone] if you develop any of the following:  · Temperature greater than 101°  F  · Inability to urinate  · A large amount of bleeding from the rectum or in the urine  · Persistent or severe pain    After hours or on weekends, you may reach a urology resident on call at this number: [phone].

## 2019-02-08 NOTE — PROCEDURES
Date: 02/08/2019     Surgeon: Vj    Assistant: None    Procedure performed: cystoscopy    Blood loss: None    Specimen: None    Procedure in detail: Using standard sterile technique, the flexible cystoscope was assembled and passed into the patient's bladder.  Cystoscopic evaluation of the bladder revealed tri-lobar hyperplasia.  The estimated prostatic length was 5 cm.

## 2019-02-08 NOTE — PROGRESS NOTES
Discussed risks/benefits of Rezum.  Discussed bleeding, frequency, failure amongst other risks.  Also discussed very small risk of EjD and ED.  He was given the chance to ask questions.  Alternatives discussed.  Will schedule for Rezum if TRUS bx negative.

## 2019-02-08 NOTE — PROCEDURES
Date: 02/08/2019     Surgeon: Vj    Assistant: None    Procedure performed: Trans rectal ultrasound          Prostate biopsy          Ultrasound guidance for needle placement    Blood loss: Min.    Specimen: Prostate cores    Procedure in detail:   After informed consent was obtained, the patient was placed in the L lateral decubitus position.  A urojet was placed into the patient's rectum.      The ultrasound probe was then inserted into the patient's rectum.  Local anesthesia was provided by injecting 10 cc of 1% lidocaine at the junction of the SV and prostate bilaterally.    The prostate was visualized with the ultrasound and transrectal ultrasound guided biopsy of the prostate was performed in the standard fashion.  A total of 12 cores were taken.  2 cores were taken from the base, middle, and apex on each side of the prostate.    TRUS volume was 59.3 cc with a large median lobe.    The patient tolerated the procedure well without complication.  He will be discharged home in stable condition.  He will follow up in 6 months with a PSA if the biopsy is negative.  Will follow up sooner if positive.

## 2019-02-14 ENCOUNTER — HOSPITAL ENCOUNTER (OUTPATIENT)
Facility: HOSPITAL | Age: 59
Discharge: HOME | End: 2019-02-14
Attending: INTERNAL MEDICINE | Admitting: INTERNAL MEDICINE
Payer: COMMERCIAL

## 2019-02-14 VITALS
SYSTOLIC BLOOD PRESSURE: 117 MMHG | RESPIRATION RATE: 18 BRPM | WEIGHT: 184 LBS | BODY MASS INDEX: 24.39 KG/M2 | TEMPERATURE: 97 F | HEIGHT: 73 IN | DIASTOLIC BLOOD PRESSURE: 62 MMHG | HEART RATE: 65 BPM | OXYGEN SATURATION: 96 %

## 2019-02-14 DIAGNOSIS — I47.29 NSVT (NONSUSTAINED VENTRICULAR TACHYCARDIA): Primary | ICD-10-CM

## 2019-02-14 DIAGNOSIS — R94.39 POSITIVE CARDIAC STRESS TEST: ICD-10-CM

## 2019-02-14 LAB
ABO + RH BLD: NORMAL
ANION GAP SERPL CALC-SCNC: 9 MMOL/L
BASOPHILS # BLD AUTO: 0.06 K/UL
BASOPHILS NFR BLD: 1.1 %
BLD GP AB SCN CELLS X3 SERPL QL: NORMAL
BUN SERPL-MCNC: 16 MG/DL
CALCIUM SERPL-MCNC: 9.1 MG/DL
CHLORIDE SERPL-SCNC: 107 MMOL/L
CO2 SERPL-SCNC: 22 MMOL/L
CREAT SERPL-MCNC: 0.8 MG/DL
DIFFERENTIAL METHOD: ABNORMAL
EOSINOPHIL # BLD AUTO: 0.1 K/UL
EOSINOPHIL NFR BLD: 1.8 %
ERYTHROCYTE [DISTWIDTH] IN BLOOD BY AUTOMATED COUNT: 13.2 %
EST. GFR  (AFRICAN AMERICAN): >60 ML/MIN/1.73 M^2
EST. GFR  (NON AFRICAN AMERICAN): >60 ML/MIN/1.73 M^2
GLUCOSE SERPL-MCNC: 95 MG/DL
HCT VFR BLD AUTO: 48.1 %
HGB BLD-MCNC: 15.8 G/DL
IMM GRANULOCYTES # BLD AUTO: 0.02 K/UL
IMM GRANULOCYTES NFR BLD AUTO: 0.4 %
INR PPP: 1.1
LYMPHOCYTES # BLD AUTO: 1.9 K/UL
LYMPHOCYTES NFR BLD: 33.8 %
MCH RBC QN AUTO: 31.2 PG
MCHC RBC AUTO-ENTMCNC: 32.8 G/DL
MCV RBC AUTO: 95 FL
MONOCYTES # BLD AUTO: 0.4 K/UL
MONOCYTES NFR BLD: 7.8 %
NEUTROPHILS # BLD AUTO: 3 K/UL
NEUTROPHILS NFR BLD: 55.1 %
NRBC BLD-RTO: 0 /100 WBC
PLATELET # BLD AUTO: 208 K/UL
PMV BLD AUTO: 9.8 FL
POTASSIUM SERPL-SCNC: 4.2 MMOL/L
PROTHROMBIN TIME: 11 SEC
RBC # BLD AUTO: 5.07 M/UL
SODIUM SERPL-SCNC: 138 MMOL/L
WBC # BLD AUTO: 5.51 K/UL

## 2019-02-14 PROCEDURE — 63600175 PHARM REV CODE 636 W HCPCS: Performed by: INTERNAL MEDICINE

## 2019-02-14 PROCEDURE — 86850 RBC ANTIBODY SCREEN: CPT

## 2019-02-14 PROCEDURE — 25000003 PHARM REV CODE 250: Performed by: STUDENT IN AN ORGANIZED HEALTH CARE EDUCATION/TRAINING PROGRAM

## 2019-02-14 PROCEDURE — 93458 L HRT ARTERY/VENTRICLE ANGIO: CPT | Mod: 26,,, | Performed by: INTERNAL MEDICINE

## 2019-02-14 PROCEDURE — C1887 CATHETER, GUIDING: HCPCS | Performed by: INTERNAL MEDICINE

## 2019-02-14 PROCEDURE — 99152 MOD SED SAME PHYS/QHP 5/>YRS: CPT | Mod: ,,, | Performed by: INTERNAL MEDICINE

## 2019-02-14 PROCEDURE — 99152 PR MOD CONSCIOUS SEDATION, SAME PHYS, 5+ YRS, FIRST 15 MIN: ICD-10-PCS | Mod: ,,, | Performed by: INTERNAL MEDICINE

## 2019-02-14 PROCEDURE — 25000003 PHARM REV CODE 250: Performed by: INTERNAL MEDICINE

## 2019-02-14 PROCEDURE — C1894 INTRO/SHEATH, NON-LASER: HCPCS | Performed by: INTERNAL MEDICINE

## 2019-02-14 PROCEDURE — 99152 MOD SED SAME PHYS/QHP 5/>YRS: CPT | Performed by: INTERNAL MEDICINE

## 2019-02-14 PROCEDURE — C1769 GUIDE WIRE: HCPCS | Performed by: INTERNAL MEDICINE

## 2019-02-14 PROCEDURE — 93458 PR CATH PLACE/CORON ANGIO, IMG SUPER/INTERP,W LEFT HEART VENTRICULOGRAPHY: ICD-10-PCS | Mod: 26,,, | Performed by: INTERNAL MEDICINE

## 2019-02-14 PROCEDURE — 93458 L HRT ARTERY/VENTRICLE ANGIO: CPT | Performed by: INTERNAL MEDICINE

## 2019-02-14 PROCEDURE — 85025 COMPLETE CBC W/AUTO DIFF WBC: CPT

## 2019-02-14 PROCEDURE — 85610 PROTHROMBIN TIME: CPT

## 2019-02-14 PROCEDURE — 80048 BASIC METABOLIC PNL TOTAL CA: CPT

## 2019-02-14 RX ORDER — HEPARIN SODIUM 200 [USP'U]/100ML
INJECTION, SOLUTION INTRAVENOUS
Status: DISCONTINUED | OUTPATIENT
Start: 2019-02-14 | End: 2019-02-14 | Stop reason: HOSPADM

## 2019-02-14 RX ORDER — NAPROXEN SODIUM 220 MG/1
81 TABLET, FILM COATED ORAL DAILY
Status: DISCONTINUED | OUTPATIENT
Start: 2019-02-14 | End: 2019-02-14

## 2019-02-14 RX ORDER — ATORVASTATIN CALCIUM 20 MG/1
20 TABLET, FILM COATED ORAL DAILY
Qty: 30 TABLET | Refills: 11 | Status: SHIPPED | OUTPATIENT
Start: 2019-02-14 | End: 2020-11-09

## 2019-02-14 RX ORDER — DIPHENHYDRAMINE HCL 25 MG
50 CAPSULE ORAL ONCE
Status: COMPLETED | OUTPATIENT
Start: 2019-02-14 | End: 2019-02-14

## 2019-02-14 RX ORDER — NITROGLYCERIN 5 MG/ML
INJECTION, SOLUTION INTRAVENOUS
Status: DISCONTINUED | OUTPATIENT
Start: 2019-02-14 | End: 2019-02-14 | Stop reason: HOSPADM

## 2019-02-14 RX ORDER — MIDAZOLAM HYDROCHLORIDE 1 MG/ML
INJECTION, SOLUTION INTRAMUSCULAR; INTRAVENOUS
Status: DISCONTINUED | OUTPATIENT
Start: 2019-02-14 | End: 2019-02-14 | Stop reason: HOSPADM

## 2019-02-14 RX ORDER — HEPARIN SODIUM 1000 [USP'U]/ML
INJECTION, SOLUTION INTRAVENOUS; SUBCUTANEOUS
Status: DISCONTINUED | OUTPATIENT
Start: 2019-02-14 | End: 2019-02-14 | Stop reason: HOSPADM

## 2019-02-14 RX ORDER — ASPIRIN 325 MG
325 TABLET ORAL ONCE
Status: COMPLETED | OUTPATIENT
Start: 2019-02-14 | End: 2019-02-14

## 2019-02-14 RX ORDER — SODIUM CHLORIDE 9 MG/ML
3 INJECTION, SOLUTION INTRAVENOUS CONTINUOUS
Status: ACTIVE | OUTPATIENT
Start: 2019-02-14 | End: 2019-02-14

## 2019-02-14 RX ORDER — FENTANYL CITRATE 50 UG/ML
INJECTION, SOLUTION INTRAMUSCULAR; INTRAVENOUS
Status: DISCONTINUED | OUTPATIENT
Start: 2019-02-14 | End: 2019-02-14 | Stop reason: HOSPADM

## 2019-02-14 RX ORDER — LIDOCAINE HYDROCHLORIDE 20 MG/ML
INJECTION, SOLUTION EPIDURAL; INFILTRATION; INTRACAUDAL; PERINEURAL
Status: DISCONTINUED | OUTPATIENT
Start: 2019-02-14 | End: 2019-02-14 | Stop reason: HOSPADM

## 2019-02-14 RX ORDER — VERAPAMIL HYDROCHLORIDE 2.5 MG/ML
INJECTION, SOLUTION INTRAVENOUS
Status: DISCONTINUED | OUTPATIENT
Start: 2019-02-14 | End: 2019-02-14 | Stop reason: HOSPADM

## 2019-02-14 RX ADMIN — SODIUM CHLORIDE 3 ML/KG/HR: 0.9 INJECTION, SOLUTION INTRAVENOUS at 09:02

## 2019-02-14 RX ADMIN — ASPIRIN 325 MG ORAL TABLET 325 MG: 325 PILL ORAL at 10:02

## 2019-02-14 RX ADMIN — DIPHENHYDRAMINE HYDROCHLORIDE 50 MG: 25 CAPSULE ORAL at 09:02

## 2019-02-14 NOTE — PLAN OF CARE
Problem: Adult Inpatient Plan of Care  Goal: Plan of Care Review  Outcome: Ongoing (interventions implemented as appropriate)  Pt arrived to floor ambulatory from home accompanied by his significant other. Pt oriented to room. Iv inserted. Admit assessment completed. Nurse call bell within reach. Will continue to monitor

## 2019-02-14 NOTE — DISCHARGE SUMMARY
.Physician Discharge Summary     Patient ID:  Jovi Pacheco  [id number]  59 y.o.  1960    Admit date: 2/14/2019    Discharge date and time:  2/14/2019     Admitting Physician: Hayden Barbosa MD     Discharge Physician: Moris Nathna MD      Primary Diagnoses: Positive cardiac stress test [R94.39]      Secondary Diagnoses:   Past Medical History:   Diagnosis Date    Allergic rhinitis 12/17/2018    Family history of atherosclerosis     History of colon polyps 1/24/2019    Keloid cicatrix     S/P right inguinal hernia repair with mesh 9/1/2017        Hospital Course:   Pt brought from short stay cardiology unit to cath lab for LHC. LHC performed via R radial artery with 6F sheath. See EPIC for report details. Arterial hemotstasis achieved via radial armband. Patient tolerated the procedure well, no complications. Pt completed post procedural protocol including IVF's. Pt received post procedure care instructions. Pt will be discharged home. Pt with nonobstructive disease but Tissue myocardial perfusion grade =2 so will cont ASA and lipitor. Will have pt f/u with Dr. Murguia      Discharge Medications:    Current Discharge Medication List      START taking these medications    Details   atorvastatin (LIPITOR) 20 MG tablet Take 1 tablet (20 mg total) by mouth once daily.  Qty: 30 tablet, Refills: 11         CONTINUE these medications which have NOT CHANGED    Details   aspirin (ECOTRIN) 81 MG EC tablet Take 81 mg by mouth once daily.      zolpidem (AMBIEN) 10 mg Tab Take 1 tablet (10 mg total) by mouth nightly as needed.  Qty: 15 tablet, Refills: 0         STOP taking these medications       clopidogrel (PLAVIX) 75 mg tablet        ciprofloxacin HCl (CIPRO) 500 MG tablet                Patient Instructions:   Activity: no lifting or Strenuous exercise for 5 days  Diet: regular diet  Wound Care: keep wound clean and dry    Follow-up with Dr. Paz as sheduled     Discharged Condition:  good    Signed:  Moris Nathan  2/14/2019  11:43 AM

## 2019-02-14 NOTE — NURSING
Per patient he gives his permission to release information to Yanique Garcia his significant other.

## 2019-02-14 NOTE — INTERVAL H&P NOTE
The patient has been examined and the H&P has been reviewed:    I concur with the findings and no changes have occurred since H&P was written. His partner, Yanique Corley is his medical decision maker in case of emergencies.      Anesthesia/Surgery risks, benefits and alternative options discussed and understood by patient/family.          Active Hospital Problems    Diagnosis  POA    Positive cardiac stress test [R94.39]  Yes      Resolved Hospital Problems   No resolved problems to display.

## 2019-02-14 NOTE — PLAN OF CARE
Problem: Adult Inpatient Plan of Care  Goal: Plan of Care Review  Outcome: Ongoing (interventions implemented as appropriate)  Received report from ANTWON Avitia. Patient s/p [facility], AAOx3. VSS, no c/o pain or discomfort at this time, resp even and unlabored.  Right wrist vasc band is CDI. Pulses 2+.  No active bleeding. No hematoma noted. Post procedure protocol reviewed with patient and patient's significant other. Understanding verbalized.  Nurse call bell within reach. Will continue to monitor per post procedure protocol.

## 2019-02-14 NOTE — Clinical Note
Prepped: groin and right radial. Prepped with: ChloraPrep. The site was clipped. The patient was draped.

## 2019-02-14 NOTE — Clinical Note
65 ml injected throughout the case. 35 mL total wasted during the case. 100 mL total used in the case.

## 2019-02-14 NOTE — PLAN OF CARE
Problem: Adult Inpatient Plan of Care  Goal: Patient-Specific Goal (Individualization)  Outcome: Outcome(s) achieved Date Met: 02/14/19  Patient discharged per MD orders. Instructions given on medications, wound care, activity, signs of infection, when to call MD, and follow up appointments. Pt verbalized understanding. Patient AAOx4, VSS, no c/o pain or discomfort at this time. gauze dressing to right wrist is c/d/i. No active bleeding. No hematoma noted. Telemetry and PIV removed. Patient and family refused transport, ambulated off unit.

## 2019-02-20 ENCOUNTER — TELEPHONE (OUTPATIENT)
Dept: UROLOGY | Facility: CLINIC | Age: 59
End: 2019-02-20

## 2019-02-20 DIAGNOSIS — R97.20 ELEVATED PROSTATE SPECIFIC ANTIGEN (PSA): Primary | ICD-10-CM

## 2019-02-20 NOTE — TELEPHONE ENCOUNTER
Pt notified of results and verbalized understanding to return to clinic in 6 months with psa done prior. Recall letter generated.

## 2019-02-21 ENCOUNTER — TELEPHONE (OUTPATIENT)
Dept: UROLOGY | Facility: CLINIC | Age: 59
End: 2019-02-21

## 2019-02-21 ENCOUNTER — PATIENT MESSAGE (OUTPATIENT)
Dept: UROLOGY | Facility: CLINIC | Age: 59
End: 2019-02-21

## 2019-02-21 DIAGNOSIS — N40.1 BPH WITH URINARY OBSTRUCTION: Primary | ICD-10-CM

## 2019-02-21 DIAGNOSIS — N13.8 BPH WITH URINARY OBSTRUCTION: Primary | ICD-10-CM

## 2019-03-07 ENCOUNTER — PATIENT MESSAGE (OUTPATIENT)
Dept: UROLOGY | Facility: CLINIC | Age: 59
End: 2019-03-07

## 2019-03-08 ENCOUNTER — ANESTHESIA EVENT (OUTPATIENT)
Dept: SURGERY | Facility: HOSPITAL | Age: 59
End: 2019-03-08

## 2019-03-08 NOTE — ANESTHESIA PREPROCEDURE EVALUATION
Eliane Thomas, RN   Registered Nurse      Pre Admission Screening   Signed                             []Hide copied text    []Hover for details      Anesthesia Assessment: Preoperative EQUATION     Planned Procedure: Procedure(s) (LRB):  DESTRUCTION, PROSTATE, TRANSURETHRAL (N/A)  Requested Anesthesia Type:Monitor Anesthesia Care  Surgeon: Kayode Zabala MD  Service: Urology  Known or anticipated Date of Surgery:3/19/2019     Surgeon notes: reviewed     This surgery was postponed D/T insurance and pt needed to see cardiology. Saw SUZANNE Cole/Dr Paz on 3/19/19. No change in health status.    Electronic QUestionnaire Assessment completed via nurse interview with patient.         No AQ        Triage considerations:      The patient has no apparent active cardiac condition (No unstable coronary Syndrome such as severe unstable angina or recent [<1 month] myocardial infarction, decompensated CHF, severe valvular   disease or significant arrhythmia)     Previous anesthesia records:GETA, MAC and No problems 1/24/19 colonoscopy:Airway/Jaw/Neck:  Airway Findings: Mouth Opening: Normal Tongue: Normal  General Airway Assessment: Average  Jaw/Neck Findings:  Neck ROM: Normal ROM               Last PCP note: within 3 months , within [facility]   Subspecialty notes: Cardiology: General     Other important co-morbidities: HLP, HX NSVT     Tests already available:  Available tests,  within 3 months . 2/14/19 CBC, BMP, PT and left heart cath. 1/31/19 stress test. 1/11/19 EKG.                            Instructions given. (See in Nurse's note)     Optimization:  Anesthesia Preop Clinic Assessment  Indicated-not required for this procedure    Medical Opinion Indicated-will ask cards for clearance-recent heart cath, followup appt 3/19                                        Plan:    Testing:  none                           Consultation:cards                           Patient  has previously scheduled Medical Appointment:  none     Navigation:              Consults scheduled.                        Results will be tracked by Preop Clinic.                                  Electronically signed by Eliane Thomas RN at 3/8/2019 11:35 AM       Pre-admit on 3/19/2019            Detailed Report    3/8/19-cards note:     Mildred Glasgow RN  Eliane Thomas, RN             I spoke with Dr. Barbosa and he said the patient is able to have MAC anesthesia.   Mildred      3/19/19 Cardiology note:   In summary, Mr. Pacheco is a 59 y.o. male with NSVT here for follow up.   Salvos of PMVT on exercise stress test. LHC showing non-obstructive CAD and tissue myocardial perfusion grade 2.   Case discussed with Dr. Paz. Recommendation is to start metoprolol succinate 25mg and maintain HRs under 140bpm.  Patient is not eager to start medication. We discussed goal to reduce NSVT. Said he would consider it. Prescription placed.  Patient also encouraged to have lipids checked annually. Mediterranean diet.      Start metoprolol 25mg daily.  Maximum heart rate <140.  Mediterranean diet.  RTC in one year, sooner if needed.     *A copy of this note has been sent to Dr. Paz*                                                                                                             03/08/2019  Jovi Pacheco is a 59 y.o., male. Pt has an EGG allergy.    Pre-op Assessment         Review of Systems  Anesthesia Hx:  EGG ALLERGY History of prior surgery of interest to airway management or planning: Previous anesthesia: MAC  1/24/19 colonscopy with MAC.  Procedure performed at an [facility]. Denies Family Hx of Anesthesia complications.   Denies Personal Hx of Anesthesia complications.   Hematology/Oncology:  Hematology Normal   Oncology Normal     EENT/Dental:EENT/Dental Normal   Cardiovascular:    Denies Angina. hyperlipidemia Heart cath completed 2/14/19. 1/31/19 positive stress test, EF 46%. Functional Capacity 5 METS  Disorder of Cardiac  Rhythm (polymorphic V tac with exertion)    Pulmonary:  Pulmonary Normal  Denies Shortness of breath.  Denies Recent URI.    Renal/:   BPH    Hepatic/GI:  Hepatic/GI Normal    Musculoskeletal:  Musculoskeletal Normal    Neurological:  Neurology Normal    Endocrine:  Endocrine Normal    Psych:  Psychiatric Normal          EP note 1/10/19:  58 yoM here for NSVT seen on exercise stress test. He has mildly impaired EF with PMVT during exertion. I recommend [facility] for assessment of obstructive CAD. If normal coronary arteries, I would recommend beta blockade and limiting exertional heart rates to under 140 bpm.     LHC 2/14/19:  · LV end diastolic pressure is normal.  · Estimated blood loss: none  · Non-obstructive CAD.  · Myocardial bridge.    Anesthesia Staff, PCC:  LHC showing non-obstructive disease.  Will follow-up with EP regarding any further recommendations.    -R. Leopold, MD 3/14/19

## 2019-03-11 ENCOUNTER — TELEPHONE (OUTPATIENT)
Dept: UROLOGY | Facility: CLINIC | Age: 59
End: 2019-03-11

## 2019-03-11 NOTE — TELEPHONE ENCOUNTER
Pt sent a msg saying he needed to reschedule his procedure from 3-19 to a date in July after the the 3rd. The next available date in July was the 16th and I rescheduled the pt and sent a [facility] msg to the patient, called his number on file no answer.

## 2019-03-14 ENCOUNTER — TELEPHONE (OUTPATIENT)
Dept: UROLOGY | Facility: CLINIC | Age: 59
End: 2019-03-14

## 2019-03-14 NOTE — TELEPHONE ENCOUNTER
Spoke to Mr Pacheco to let him know that his insurance authorization was still pending, I told him we should have an answer maybe tomorrow or no later than Monday. If we didn't get approval before his procedure date 3-19 we would have to reschedule. I received a call from ishan lopez saying that the patient has to make an appt to see Dr Paz his cardiologist before he has this procedure. I told the patient he's welcome to talk to her and she ok'd that I could give the patient her number.

## 2019-03-14 NOTE — TELEPHONE ENCOUNTER
Sent Mr John a [facility] msg to his pt portal letting him know that I have moved his follow up appt and that his procedure is still scheduled for 3-19. I call and left a message on his cell phone for him to return my call to confirm.

## 2019-03-15 ENCOUNTER — TELEPHONE (OUTPATIENT)
Dept: UROLOGY | Facility: CLINIC | Age: 59
End: 2019-03-15

## 2019-03-15 ENCOUNTER — PATIENT MESSAGE (OUTPATIENT)
Dept: UROLOGY | Facility: CLINIC | Age: 59
End: 2019-03-15

## 2019-03-15 NOTE — TELEPHONE ENCOUNTER
The patient returned my call and I asked if he had made an appt with Dr Jackson junior and he said no. I let him know that I have put his procedure for 3-19 on hold and that verification of insurance was still pending. He said he was going to follow up and give them a call today.

## 2019-03-15 NOTE — TELEPHONE ENCOUNTER
Patient called back to reschedule his procedure, he agreed on the first available which was 4-23. He asked to be moved up if someone cancels, also insurance as of now is still pending. Pt verbally understood.

## 2019-03-15 NOTE — TELEPHONE ENCOUNTER
Called to let this patient know that his procedure has to be rescheduled until he has clearance from his cardiologist Dr Jackson lopez. I left a message for him to return my call and sent him a [facility] message in his patient portal. His procedure is set for 3-19 and will be put on hold. Also his insurance is still pending.

## 2019-03-19 ENCOUNTER — ANESTHESIA (OUTPATIENT)
Dept: SURGERY | Facility: HOSPITAL | Age: 59
End: 2019-03-19

## 2019-03-19 ENCOUNTER — OFFICE VISIT (OUTPATIENT)
Dept: ELECTROPHYSIOLOGY | Facility: CLINIC | Age: 59
End: 2019-03-19
Payer: COMMERCIAL

## 2019-03-19 ENCOUNTER — HOSPITAL ENCOUNTER (OUTPATIENT)
Dept: CARDIOLOGY | Facility: CLINIC | Age: 59
Discharge: HOME | End: 2019-03-19
Payer: COMMERCIAL

## 2019-03-19 VITALS
HEART RATE: 75 BPM | BODY MASS INDEX: 25.22 KG/M2 | DIASTOLIC BLOOD PRESSURE: 78 MMHG | SYSTOLIC BLOOD PRESSURE: 134 MMHG | HEIGHT: 73 IN | WEIGHT: 190.25 LBS

## 2019-03-19 DIAGNOSIS — I47.29 NSVT (NONSUSTAINED VENTRICULAR TACHYCARDIA): Primary | ICD-10-CM

## 2019-03-19 DIAGNOSIS — I47.29 NSVT (NONSUSTAINED VENTRICULAR TACHYCARDIA): ICD-10-CM

## 2019-03-19 PROCEDURE — 99214 OFFICE O/P EST MOD 30 MIN: CPT | Mod: S$GLB,,, | Performed by: NURSE PRACTITIONER

## 2019-03-19 PROCEDURE — 93000 ELECTROCARDIOGRAM COMPLETE: CPT | Mod: S$GLB,,, | Performed by: INTERNAL MEDICINE

## 2019-03-19 PROCEDURE — 99999 PR PBB SHADOW E&M-EST. PATIENT-LVL III: ICD-10-PCS | Mod: PBBFAC,,, | Performed by: NURSE PRACTITIONER

## 2019-03-19 PROCEDURE — 3008F BODY MASS INDEX DOCD: CPT | Mod: CPTII,S$GLB,, | Performed by: NURSE PRACTITIONER

## 2019-03-19 PROCEDURE — 99999 PR PBB SHADOW E&M-EST. PATIENT-LVL III: CPT | Mod: PBBFAC,,, | Performed by: NURSE PRACTITIONER

## 2019-03-19 PROCEDURE — 93000 RHYTHM STRIP: ICD-10-PCS | Mod: S$GLB,,, | Performed by: INTERNAL MEDICINE

## 2019-03-19 PROCEDURE — 99214 PR OFFICE/OUTPT VISIT, EST, LEVL IV, 30-39 MIN: ICD-10-PCS | Mod: S$GLB,,, | Performed by: NURSE PRACTITIONER

## 2019-03-19 PROCEDURE — 3008F PR BODY MASS INDEX (BMI) DOCUMENTED: ICD-10-PCS | Mod: CPTII,S$GLB,, | Performed by: NURSE PRACTITIONER

## 2019-03-19 RX ORDER — METOPROLOL SUCCINATE 25 MG/1
25 TABLET, EXTENDED RELEASE ORAL DAILY
Qty: 30 TABLET | Refills: 11 | Status: SHIPPED | OUTPATIENT
Start: 2019-03-19 | End: 2020-11-09

## 2019-03-19 NOTE — PROGRESS NOTES
Mr. Pacheco is a patient of Dr. Paz and was last seen in clinic 1/10/2019.      Subjective:   Patient ID:  Jovi Pacheco is a 59 y.o. male who presents for follow-up of follow up/ NSVT  .     HPI:    Mr. Pacheco is a 59 y.o. male with NSVT here for follow up.      Background:    1/2019: Mr. Pacheco underwent a routine echo for family history of cardiac disease. Echo showed EF 50%. Exercise stress echo showed no ischemia but he has several salvos of PMVT lasting up to 6 beats. He reported no symptoms during activity. He admits to heavy exertion during exercise. He can frequently push his heart rate above 150-160 bpm. He denies syncope, near syncope, palpitations, chest pain, dyspnea. No prior known arrhythmia or cardiac issues.   He has mildly impaired EF with PMVT during exertion. I recommend LHC for assessment of obstructive CAD. If normal coronary arteries, I would recommend beta blockade and limiting exertional heart rates to under 140 bpm. IC referral and RTC 6w.   Family history of MI (mother).    Update (03/19/2019):    Underwent LHC on 2/14/2019 which found nonobstructive CAD and tissue myocardial perfusion grade 2. Statin was recommended.    Today he says he feels great but never had any cardiac complaints. Mr. Pacheco denies chest pain with exertion or at rest, palpitations, SOB, RIVAS, dizziness, or syncope. Exercises daily, with a usual HR goal of 140. He feels like he can go much faster but he is holding back.       I have personally reviewed the patient's EKG today, which shows sinus rhythm at 75bpm. HI interval is 166. QTc is 444.    Recent Cardiac Tests:    2D Echo (12/21/2019):  · Low normal left ventricular systolic function. The estimated ejection fraction is 50%  · Normal LV diastolic function.  · Mild-moderate left atrial enlargement.  · Mild mitral sclerosis.  · Mild to moderate tricuspid regurgitation.  · Mild mitral regurgitation.  · Mild right ventricular enlargement.  · Normal  "right ventricular systolic function.    Current Outpatient Medications   Medication Sig    aspirin (ECOTRIN) 81 MG EC tablet Take 81 mg by mouth once daily.    atorvastatin (LIPITOR) 20 MG tablet Take 1 tablet (20 mg total) by mouth once daily.    fish oil-omega-3 fatty acids 300-1,000 mg capsule Take by mouth once daily.    multivitamin (ONE DAILY MULTIVITAMIN) per tablet Take 1 tablet by mouth once daily.    zolpidem (AMBIEN) 10 mg Tab Take 1 tablet (10 mg total) by mouth nightly as needed.     No current facility-administered medications for this visit.      Review of Systems   Constitution: Negative for malaise/fatigue.   Cardiovascular: Negative for chest pain, dyspnea on exertion, irregular heartbeat, leg swelling and palpitations.   Respiratory: Negative for shortness of breath.    Hematologic/Lymphatic: Negative for bleeding problem.   Skin: Negative for rash.   Musculoskeletal: Negative for myalgias.   Gastrointestinal: Negative for hematemesis, hematochezia and nausea.   Genitourinary: Negative for hematuria.   Neurological: Negative for light-headedness.   Psychiatric/Behavioral: Negative for altered mental status.   Allergic/Immunologic: Negative for persistent infections.     Objective:        /78   Pulse 75   Ht 6' 1" (1.854 m)   Wt 86.3 kg (190 lb 4.1 oz)   BMI 25.10 kg/m²     Physical Exam   Constitutional: He is oriented to person, place, and time. He appears well-developed and well-nourished.   HENT:   Head: Normocephalic.   Nose: Nose normal.   Eyes: Pupils are equal, round, and reactive to light.   Cardiovascular: Normal rate, regular rhythm, S1 normal and S2 normal.   No murmur heard.  Pulses:       Radial pulses are 2+ on the right side, and 2+ on the left side.   Pulmonary/Chest: Breath sounds normal. No respiratory distress.   Abdominal: Normal appearance.   Musculoskeletal: Normal range of motion. He exhibits no edema.   Neurological: He is alert and oriented to person, place, " and time.   Skin: Skin is warm and dry. No erythema.   Psychiatric: He has a normal mood and affect. His speech is normal and behavior is normal.   Nursing note and vitals reviewed.    Lab Results   Component Value Date     02/14/2019    K 4.2 02/14/2019    BUN 16 02/14/2019    CREATININE 0.8 02/14/2019    ALT 31 01/25/2019    AST 23 01/25/2019    HGB 15.8 02/14/2019    HCT 48.1 02/14/2019    TSH 0.467 12/17/2018    LDLCALC 135.8 12/17/2018       Recent Labs   Lab 02/14/19  1029   INR 1.1      Assessment:     1. NSVT (nonsustained ventricular tachycardia)      Plan:     In summary, Mr. Pacheco is a 59 y.o. male with NSVT here for follow up.   Salvos of PMVT on exercise stress test. LHC showing non-obstructive CAD and tissue myocardial perfusion grade 2.   Case discussed with Dr. Paz. Recommendation is to start metoprolol succinate 25mg and maintain HRs under 140bpm.  Patient is not eager to start medication. We discussed goal to reduce NSVT. Said he would consider it. Prescription placed.  Patient also encouraged to have lipids checked annually. Mediterranean diet.     Start metoprolol 25mg daily.  Maximum heart rate <140.  Mediterranean diet.  RTC in one year, sooner if needed.    *A copy of this note has been sent to Dr. Paz*    Follow-up in about 1 year (around 3/19/2020).    ------------------------------------------------------------------    LUCIANA Sierra, NP-C  [facility]

## 2019-03-19 NOTE — PATIENT INSTRUCTIONS
Recommend:  Start metoprolol 25mg daily.  Maximum heart rate 140.  Mediterranean diet.  Annual lipids with primary care physician.

## 2019-03-27 ENCOUNTER — TELEPHONE (OUTPATIENT)
Dept: UROLOGY | Facility: CLINIC | Age: 59
End: 2019-03-27

## 2019-03-27 NOTE — TELEPHONE ENCOUNTER
Pt called to ask if his procedure could be moved up from 4-23. The available date I had was 4-9, the pt agreed and verbally understood.

## 2019-03-27 NOTE — TELEPHONE ENCOUNTER
Received a msg from [facility] that the pt case was denied due to not being medically necessary. I called the patient to let him know and he asked to leave the procedure on 4-9 until Friday 3-29 so he could get in touch with his ins company. I told him the case would have to be postponed/canceled until his insurance approves. I will be expecting his call on Friday.

## 2019-07-03 ENCOUNTER — TELEPHONE (OUTPATIENT)
Dept: UROLOGY | Facility: CLINIC | Age: 59
End: 2019-07-03

## 2019-07-03 ENCOUNTER — OFFICE VISIT (OUTPATIENT)
Dept: UROLOGY | Facility: CLINIC | Age: 59
End: 2019-07-03
Payer: COMMERCIAL

## 2019-07-03 VITALS
BODY MASS INDEX: 25.13 KG/M2 | HEART RATE: 74 BPM | SYSTOLIC BLOOD PRESSURE: 124 MMHG | HEIGHT: 73 IN | WEIGHT: 189.63 LBS | DIASTOLIC BLOOD PRESSURE: 73 MMHG

## 2019-07-03 DIAGNOSIS — N13.8 BPH WITH URINARY OBSTRUCTION: Primary | ICD-10-CM

## 2019-07-03 DIAGNOSIS — R97.20 ELEVATED PROSTATE SPECIFIC ANTIGEN (PSA): ICD-10-CM

## 2019-07-03 DIAGNOSIS — N40.1 BPH WITH URINARY OBSTRUCTION: Primary | ICD-10-CM

## 2019-07-03 PROCEDURE — 99214 OFFICE O/P EST MOD 30 MIN: CPT | Mod: S$GLB,,, | Performed by: UROLOGY

## 2019-07-03 PROCEDURE — 99999 PR PBB SHADOW E&M-EST. PATIENT-LVL III: CPT | Mod: PBBFAC,,, | Performed by: UROLOGY

## 2019-07-03 PROCEDURE — 3008F BODY MASS INDEX DOCD: CPT | Mod: CPTII,S$GLB,, | Performed by: UROLOGY

## 2019-07-03 PROCEDURE — 3008F PR BODY MASS INDEX (BMI) DOCUMENTED: ICD-10-PCS | Mod: CPTII,S$GLB,, | Performed by: UROLOGY

## 2019-07-03 PROCEDURE — 99999 PR PBB SHADOW E&M-EST. PATIENT-LVL III: ICD-10-PCS | Mod: PBBFAC,,, | Performed by: UROLOGY

## 2019-07-03 PROCEDURE — 99214 PR OFFICE/OUTPT VISIT, EST, LEVL IV, 30-39 MIN: ICD-10-PCS | Mod: S$GLB,,, | Performed by: UROLOGY

## 2019-07-03 NOTE — PROGRESS NOTES
CHIEF COMPLAINT:    Mr. Pacheco is a 59 y.o. male presenting with an elevated PSA and LUTS.    PRESENTING ILLNESS:    Jovi Pacheco is a 59 y.o. male who c/o LUTS.  He has nocturia x 3, + feeling of incomplete emptying, + decreased FOS.  No hematuria.  No dysuria.  He'd like a procedure done as he doesn't like meds.    He has some mild ED, but it's not bothersome.    He has a history of an elevated PSA and underwent a negative TRUS bx when his PSA was 4.2.    REVIEW OF SYSTEMS:    Jovi Pacheco denies headache, blurred vision, fever, nausea, vomiting, chills, abdominal pain, bleeding per rectum, cough, SOB, recent loss of consciousness, recent mental status changes, seizures, dizziness, or upper or lower extremity weakness.    FREDERICK  1. 4  2. 5  3. 4  4. 5  5. 5       PATIENT HISTORY:    Past Medical History:   Diagnosis Date    Allergic rhinitis 12/17/2018    Family history of atherosclerosis     History of colon polyps 1/24/2019    Keloid cicatrix     S/P right inguinal hernia repair with mesh 9/1/2017       Past Surgical History:   Procedure Laterality Date    ANGIOGRAM, CORONARY ARTERY Bilateral 2/14/2019    Performed by [facility] Barbosa MD at [facility] CATH LAB    COLONOSCOPY N/A 1/24/2019    Performed by Mekhi Montez MD at [facility] ENDO (4TH FLR)    EYE SURGERY      HERNIA REPAIR Right     inguinal    Left heart cath Left 2/14/2019    Performed by [facility]den [facility]osa MD at [facility] CATH LAB    REPAIR-HERNIA-INGUINAL-INITIAL (5 YRS+) Open with Mesh Right 9/1/2017    Performed by Joshua Goldberg, MD at [facility] OR 2ND FLR       Family History   Problem Relation Age of Onset    Heart disease Mother     Stroke Mother     Cancer Father     No Known Problems Brother     Melanoma Neg Hx        Social History     Socioeconomic History    Marital status:      Spouse name: Not on file    Number of children: Not on file    Years of education: Not on file    Highest education level: Not  on file   Occupational History    Not on file   Social Needs    Financial resource strain: Not on file    Food insecurity:     Worry: Not on file     Inability: Not on file    Transportation needs:     Medical: Not on file     Non-medical: Not on file   Tobacco Use    Smoking status: Never Smoker    Smokeless tobacco: Never Used   Substance and Sexual Activity    Alcohol use: Yes     Alcohol/week: 1.2 oz     Types: 1 Glasses of wine, 1 Cans of beer per week     Comment: limited/sporadic    Drug use: No    Sexual activity: Not on file   Lifestyle    Physical activity:     Days per week: Not on file     Minutes per session: Not on file    Stress: Not on file   Relationships    Social connections:     Talks on phone: Not on file     Gets together: Not on file     Attends Jewish service: Not on file     Active member of club or organization: Not on file     Attends meetings of clubs or organizations: Not on file     Relationship status: Not on file   Other Topics Concern    Not on file   Social History Narrative    Not on file       Allergies:  Cat/feline products; Dog dander; Peanut; and Egg derived    Medications:    Current Outpatient Medications:     aspirin (ECOTRIN) 81 MG EC tablet, Take 81 mg by mouth once daily., Disp: , Rfl:     atorvastatin (LIPITOR) 20 MG tablet, Take 1 tablet (20 mg total) by mouth once daily., Disp: 30 tablet, Rfl: 11    fish oil-omega-3 fatty acids 300-1,000 mg capsule, Take by mouth once daily., Disp: , Rfl:     metoprolol succinate (TOPROL-XL) 25 MG 24 hr tablet, Take 1 tablet (25 mg total) by mouth once daily., Disp: 30 tablet, Rfl: 11    multivitamin (ONE DAILY MULTIVITAMIN) per tablet, Take 1 tablet by mouth once daily., Disp: , Rfl:     zolpidem (AMBIEN) 10 mg Tab, Take 1 tablet (10 mg total) by mouth nightly as needed., Disp: 15 tablet, Rfl: 0    PHYSICAL EXAMINATION:    The patient generally appears in good health, is appropriately interactive, and is in no  apparent distress.     Eyes: anicteric sclerae, moist conjunctivae; no lid-lag; PERRLA     HENT: Atraumatic; oropharynx clear with moist mucous membranes and no mucosal ulcerations;normal hard and soft palate.  No evidence of lymphadenopathy.    Neck: Trachea midline.  No thyromegaly.    Musculoskeletal: No abnormal gait.    Skin: No lesions.    Mental: Cooperative with normal affect.  Is oriented to time, place, and person.    Neuro: Grossly intact.    Chest: Normal inspiratory effort.   No accessory muscles.  No audible wheezes.  Respirations symmetric on inspiration and expiration.    Heart: Regular rhythm.      Abdomen:  Soft, non-tender. No masses or organomegaly. Bladder is not palpable. No evidence of flank discomfort. No evidence of inguinal hernia.    Genitourinary: The penis is not circumcised with no evidence of plaques or induration. The urethral meatus is normal. The testes, epididymides, and cord structures are normal in size and contour bilaterally. The scrotum is normal in size and contour.    Normal anal sphincter tone. No rectal mass.    The prostate is 45 g. Normal landmarks. Lateral sulci. Median furrow intact.  No nodularity or induration. Seminal vesicles are normal.    Extremities: No clubbing, cyanosis, or edema      LABS:    UA dipped negative today  Lab Results   Component Value Date    PSA 4.6 (H) 12/17/2018    PSA 2.6 02/11/2010    PSADIAG 4.2 (H) 01/25/2019    PSATOTAL 2.5 02/15/2010    PSAFREE 0.27 02/15/2010    PSAFREEPCT 10.80 02/15/2010       IMPRESSION:    Encounter Diagnoses   Name Primary?    BPH with urinary obstruction Yes    Elevated prostate specific antigen (PSA)          PLAN:    1. Discussed risks/benefits of Rezum.  Discussed bleeding, frequency, failure amongst other risks.  Also discussed very small risk of EjD and ED.  He was given the chance to ask questions.  Alternatives discussed.    2. Will draw a PSA today.      Copy to:

## 2019-07-26 ENCOUNTER — ANESTHESIA EVENT (OUTPATIENT)
Dept: SURGERY | Facility: HOSPITAL | Age: 59
End: 2019-07-26
Payer: COMMERCIAL

## 2019-07-26 DIAGNOSIS — Z01.818 PREOPERATIVE TESTING: Primary | ICD-10-CM

## 2019-07-26 NOTE — ANESTHESIA PREPROCEDURE EVALUATION
Anesthesia Assessment: Preoperative EQUATION     Planned Procedure: Procedure(s) (LRB):  DESTRUCTION, PROSTATE, TRANSURETHRAL (N/A)  Requested Anesthesia Type:Monitor Anesthesia Care  Surgeon: Kayode Zabala MD  Service: Urology  Known or anticipated Date of Surgery:8/6/2019     Surgeon notes: reviewed     Electronic QUestionnaire Assessment completed via nurse interview with patient.    NO AQ     Triage considerations:      The patient has no apparent active cardiac condition (No unstable coronary Syndrome such as severe unstable angina or recent [<1 month] myocardial infarction, decompensated CHF, severe valvular   disease or significant arrhythmia)     Previous anesthesia records:MAC and No problems  1/24/2019 COLONOSCOPY Airway/Jaw/Neck:  Airway Findings: Mouth Opening: Normal Tongue: Normal  General Airway Assessment: Average  Jaw/Neck Findings:  Neck ROM: Normal ROM      Last PCP note: 6-12 months ago , within [facility]   Subspecialty notes: Cardiology: EP and General, UROLOGY, OPTOMETRY, & DERMATOLOGY     Other important co-morbidities: BPH, CAROTID ARTERY STENOSIS, H/O NSVT, H/O COLON POLYP      Tests already available:  Available tests,  within 3 months , within [facility] .7/3/2019 PSA, 8/29/2017 EKG                       Instructions given. (See in Nurse's note)     Optimization:  Anesthesia Preop Clinic Assessment  Indicated- Not indicated for this surgery           Plan:           Testing:  Hematology Profile and BMP                      Patient  has previously scheduled Medical Appointment:none     Navigation: Tests Scheduled. TBD                               Results will be tracked by Preop Clinic.                          Electronically signed by Bethanie Alvarado RN at 7/26/2019 10:49 AM       Pre-admit on 8/6/2019          Detailed Report  7/30 Labs resulted and noted.      Patient Active Problem List   Diagnosis    Allergic rhinitis    Stenosis of left carotid artery    BPH with urinary  obstruction    NSVT (nonsustained ventricular tachycardia)    Elevated prostate specific antigen (PSA)    Positive cardiac stress test    BPH with obstruction/lower urinary tract symptoms     No current facility-administered medications on file prior to encounter.      Current Outpatient Medications on File Prior to Encounter   Medication Sig Dispense Refill    aspirin (ECOTRIN) 81 MG EC tablet Take 81 mg by mouth once daily.      fish oil-omega-3 fatty acids 300-1,000 mg capsule Take by mouth once daily.      multivitamin (ONE DAILY MULTIVITAMIN) per tablet Take 1 tablet by mouth once daily.      atorvastatin (LIPITOR) 20 MG tablet Take 1 tablet (20 mg total) by mouth once daily. 30 tablet 11    metoprolol succinate (TOPROL-XL) 25 MG 24 hr tablet Take 1 tablet (25 mg total) by mouth once daily. 30 tablet 11    zolpidem (AMBIEN) 10 mg Tab Take 1 tablet (10 mg total) by mouth nightly as needed. 15 tablet 0                                                                                                             07/26/2019  Jovi Pacheco is a 59 y.o., male.    Anesthesia Evaluation         Review of Systems  Anesthesia Hx:  No problems with previous Anesthesia Denies Family Hx of Anesthesia complications.   Denies Personal Hx of Anesthesia complications.   Social:  Non-Smoker, Social Alcohol Use    Hematology/Oncology:  Hematology Normal   Oncology Normal     EENT/Dental:   chronic allergic rhinitis   Cardiovascular:    Denies Angina. H/O NSVT Functional Capacity 4 METS, able to climb  2 flights of stairs  Carotoid Artery Disease, left , Left stenosis is 1-39%   Denies Disorder of Cardiac Rhythm    Pulmonary:  Pulmonary Normal    Renal/:   BPH  Renal Symptoms/Infections/Stones: nocturia.  Incomplete bladder emptying   Hepatic/GI:   H/O COLON POLYP Denies Bowel Conditions    Musculoskeletal:  Musculoskeletal General/Symptoms: Functional capacity is ambulatory without assistance.     Endocrine:  Endocrine Normal    Psych:  Psychiatric Normal           Physical Exam  General:  Well nourished    Airway/Jaw/Neck:  Airway Findings: Mouth Opening: Normal Tongue: Normal  General Airway Assessment: Adult  Improves to I with phonation.  TM Distance: Normal, at least 6 cm            Mental Status:  Mental Status Findings:  Cooperative, Alert and Oriented         Anesthesia Plan  Type of Anesthesia, risks & benefits discussed:  Anesthesia Type:  general  Patient's Preference:   Intra-op Monitoring Plan: standard ASA monitors  Intra-op Monitoring Plan Comments:   Post Op Pain Control Plan: multimodal analgesia  Post Op Pain Control Plan Comments:   Induction:   IV  Beta Blocker:  Patient is on a Beta-Blocker and has received one dose within the past 24 hours (No further documentation required).       Informed Consent: Patient understands risks and agrees with Anesthesia plan.  Questions answered. Anesthesia consent signed with patient.  ASA Score: 3     Day of Surgery Review of History & Physical:    H&P update referred to the surgeon.         Ready For Surgery From Anesthesia Perspective.

## 2019-07-26 NOTE — PRE-PROCEDURE INSTRUCTIONS
Patient stated has not had any problems with anesthesia in the past. Will need preop labs. Our  will call to set up this appt.Preop instructions given. Hold asa, asa containing products, nsaids, vitamins and supplements one week prior to surgery.  Shower the night before surgery and the morning of surgery with an antibacterial soap( hibiclens or dial antibacterial soap).  Nothing on the skin once shower. Do not apply any deodorant, lotion, powder, perfume,or aftershave.  No jewelry going to surgery.   May have solid foods, gum, and hard candy until 8 hours before surgery/procedure time.  May have clear liquids( water, gatorade, powerade or apple juice) until 2 hours prior to surgery/procedure time.  No red or orange drinks. If in doubt , drink water. Nothing to drink 2 hours before arrival time for surgery/procedure. If you are told to take medication in the morning of surgery, it may be taken with a sip of water. If your doctor tells you something different pertaining to when to stop eating or drinking, follow your doctor's instructions.Call for changes in status or questions. Directions wree given to the surgery center. Verbalizes understanding.

## 2019-07-26 NOTE — PRE ADMISSION SCREENING
Anesthesia Assessment: Preoperative EQUATION    Planned Procedure: Procedure(s) (LRB):  DESTRUCTION, PROSTATE, TRANSURETHRAL (N/A)  Requested Anesthesia Type:Monitor Anesthesia Care  Surgeon: Kayode Zabala MD  Service: Urology  Known or anticipated Date of Surgery:8/6/2019    Surgeon notes: reviewed    Electronic QUestionnaire Assessment completed via nurse interview with patient.    NO AQ    Triage considerations:     The patient has no apparent active cardiac condition (No unstable coronary Syndrome such as severe unstable angina or recent [<1 month] myocardial infarction, decompensated CHF, severe valvular   disease or significant arrhythmia)    Previous anesthesia records:MAC and No problems  1/24/2019 COLONOSCOPY Airway/Jaw/Neck:  Airway Findings: Mouth Opening: Normal Tongue: Normal  General Airway Assessment: Average  Jaw/Neck Findings:  Neck ROM: Normal ROM      Last PCP note: 6-12 months ago , within [facility]   Subspecialty notes: Cardiology: EP and General, UROLOGY, OPTOMETRY, & DERMATOLOGY    Other important co-morbidities: BPH, CAROTID ARTERY STENOSIS, H/O NSVT, H/O COLON POLYP      Tests already available:  Available tests,  within 3 months , within [facility] .7/3/2019 PSA, 8/29/2017 EKG            Instructions given. (See in Nurse's note)    Optimization:  Anesthesia Preop Clinic Assessment  Indicated- Not indicated for this surgery        Plan:    Testing:  Hematology Profile and BMP     Patient  has previously scheduled Medical Appointment:none    Navigation: Tests Scheduled. TBD                        Results will be tracked by Preop Clinic.

## 2019-07-29 ENCOUNTER — TELEPHONE (OUTPATIENT)
Dept: PREADMISSION TESTING | Facility: HOSPITAL | Age: 59
End: 2019-07-29

## 2019-07-29 ENCOUNTER — LAB VISIT (OUTPATIENT)
Dept: LAB | Facility: HOSPITAL | Age: 59
End: 2019-07-29
Attending: ANESTHESIOLOGY
Payer: COMMERCIAL

## 2019-07-29 DIAGNOSIS — Z01.818 PREOPERATIVE TESTING: ICD-10-CM

## 2019-07-29 LAB
ANION GAP SERPL CALC-SCNC: 10 MMOL/L (ref 8–16)
BUN SERPL-MCNC: 21 MG/DL (ref 8–23)
CALCIUM SERPL-MCNC: 9.4 MG/DL (ref 8.7–10.5)
CHLORIDE SERPL-SCNC: 102 MMOL/L (ref 95–110)
CO2 SERPL-SCNC: 25 MMOL/L (ref 23–29)
CREAT SERPL-MCNC: 0.9 MG/DL (ref 0.5–1.4)
ERYTHROCYTE [DISTWIDTH] IN BLOOD BY AUTOMATED COUNT: 12.7 % (ref 11.5–14.5)
EST. GFR  (AFRICAN AMERICAN): >60 ML/MIN/1.73 M^2
EST. GFR  (NON AFRICAN AMERICAN): >60 ML/MIN/1.73 M^2
GLUCOSE SERPL-MCNC: 123 MG/DL (ref 70–110)
HCT VFR BLD AUTO: 47.9 % (ref 40–54)
HGB BLD-MCNC: 16.2 G/DL (ref 14–18)
MCH RBC QN AUTO: 31.3 PG (ref 27–31)
MCHC RBC AUTO-ENTMCNC: 33.8 G/DL (ref 32–36)
MCV RBC AUTO: 93 FL (ref 82–98)
PLATELET # BLD AUTO: 223 K/UL (ref 150–350)
PMV BLD AUTO: 10.1 FL (ref 9.2–12.9)
POTASSIUM SERPL-SCNC: 4.1 MMOL/L (ref 3.5–5.1)
RBC # BLD AUTO: 5.17 M/UL (ref 4.6–6.2)
SODIUM SERPL-SCNC: 137 MMOL/L (ref 136–145)
WBC # BLD AUTO: 7.01 K/UL (ref 3.9–12.7)

## 2019-07-29 PROCEDURE — 80048 BASIC METABOLIC PNL TOTAL CA: CPT

## 2019-07-29 PROCEDURE — 85027 COMPLETE CBC AUTOMATED: CPT

## 2019-07-29 PROCEDURE — 36415 COLL VENOUS BLD VENIPUNCTURE: CPT | Mod: PO

## 2019-07-29 NOTE — TELEPHONE ENCOUNTER
----- Message from Bethanie Alvarado RN sent at 7/26/2019 11:00 AM CDT -----  Please schedule labs. Thanks!

## 2019-08-05 ENCOUNTER — TELEPHONE (OUTPATIENT)
Dept: UROLOGY | Facility: CLINIC | Age: 59
End: 2019-08-05

## 2019-08-05 NOTE — TELEPHONE ENCOUNTER
Called pt to confirm arrival time of 630am for procedure on 8-6-19. Gave pt NPO instructions and gave pt opportunity to ask questions. Pt verbalized understanding.

## 2019-08-06 ENCOUNTER — ANESTHESIA (OUTPATIENT)
Dept: SURGERY | Facility: HOSPITAL | Age: 59
End: 2019-08-06
Payer: COMMERCIAL

## 2019-08-06 ENCOUNTER — HOSPITAL ENCOUNTER (OUTPATIENT)
Facility: HOSPITAL | Age: 59
Discharge: HOME | End: 2019-08-06
Attending: UROLOGY | Admitting: UROLOGY
Payer: COMMERCIAL

## 2019-08-06 VITALS
HEART RATE: 53 BPM | TEMPERATURE: 98 F | HEIGHT: 73 IN | RESPIRATION RATE: 19 BRPM | DIASTOLIC BLOOD PRESSURE: 74 MMHG | OXYGEN SATURATION: 99 % | SYSTOLIC BLOOD PRESSURE: 121 MMHG | WEIGHT: 186 LBS | BODY MASS INDEX: 24.65 KG/M2

## 2019-08-06 DIAGNOSIS — N13.8 BPH WITH OBSTRUCTION/LOWER URINARY TRACT SYMPTOMS: ICD-10-CM

## 2019-08-06 DIAGNOSIS — N40.1 BPH WITH OBSTRUCTION/LOWER URINARY TRACT SYMPTOMS: ICD-10-CM

## 2019-08-06 PROCEDURE — 63600175 PHARM REV CODE 636 W HCPCS: Performed by: NURSE ANESTHETIST, CERTIFIED REGISTERED

## 2019-08-06 PROCEDURE — D9220A PRA ANESTHESIA: Mod: CRNA,,, | Performed by: NURSE ANESTHETIST, CERTIFIED REGISTERED

## 2019-08-06 PROCEDURE — 25000003 PHARM REV CODE 250: Performed by: UROLOGY

## 2019-08-06 PROCEDURE — 37000009 HC ANESTHESIA EA ADD 15 MINS: Performed by: UROLOGY

## 2019-08-06 PROCEDURE — D9220A PRA ANESTHESIA: Mod: ANES,,, | Performed by: ANESTHESIOLOGY

## 2019-08-06 PROCEDURE — 36000707: Performed by: UROLOGY

## 2019-08-06 PROCEDURE — 63600175 PHARM REV CODE 636 W HCPCS: Performed by: STUDENT IN AN ORGANIZED HEALTH CARE EDUCATION/TRAINING PROGRAM

## 2019-08-06 PROCEDURE — D9220A PRA ANESTHESIA: ICD-10-PCS | Mod: CRNA,,, | Performed by: NURSE ANESTHETIST, CERTIFIED REGISTERED

## 2019-08-06 PROCEDURE — 53854 PR TRANSURETH DESTRUCTION, PROSTATE TISSUE, RF WV THERMOTHERAPY: ICD-10-PCS | Mod: ,,, | Performed by: UROLOGY

## 2019-08-06 PROCEDURE — 27201423 OPTIME MED/SURG SUP & DEVICES STERILE SUPPLY: Performed by: UROLOGY

## 2019-08-06 PROCEDURE — 53854 TRURL DSTRJ PRST8 TISS RF WV: CPT | Mod: ,,, | Performed by: UROLOGY

## 2019-08-06 PROCEDURE — D9220A PRA ANESTHESIA: ICD-10-PCS | Mod: ANES,,, | Performed by: ANESTHESIOLOGY

## 2019-08-06 PROCEDURE — 25000003 PHARM REV CODE 250: Performed by: STUDENT IN AN ORGANIZED HEALTH CARE EDUCATION/TRAINING PROGRAM

## 2019-08-06 PROCEDURE — 37000008 HC ANESTHESIA 1ST 15 MINUTES: Performed by: UROLOGY

## 2019-08-06 PROCEDURE — 36000706: Performed by: UROLOGY

## 2019-08-06 PROCEDURE — 71000044 HC DOSC ROUTINE RECOVERY FIRST HOUR: Performed by: UROLOGY

## 2019-08-06 PROCEDURE — 71000015 HC POSTOP RECOV 1ST HR: Performed by: UROLOGY

## 2019-08-06 RX ORDER — PROPOFOL 10 MG/ML
VIAL (ML) INTRAVENOUS
Status: DISCONTINUED | OUTPATIENT
Start: 2019-08-06 | End: 2019-08-06

## 2019-08-06 RX ORDER — LIDOCAINE HCL/PF 100 MG/5ML
SYRINGE (ML) INTRAVENOUS
Status: DISCONTINUED | OUTPATIENT
Start: 2019-08-06 | End: 2019-08-06

## 2019-08-06 RX ORDER — OXYBUTYNIN CHLORIDE 5 MG/1
5 TABLET ORAL 3 TIMES DAILY
Qty: 15 TABLET | Refills: 0 | Status: SHIPPED | OUTPATIENT
Start: 2019-08-06 | End: 2020-11-09

## 2019-08-06 RX ORDER — OXYBUTYNIN CHLORIDE 5 MG/1
5 TABLET ORAL 3 TIMES DAILY
Status: DISCONTINUED | OUTPATIENT
Start: 2019-08-06 | End: 2019-08-06 | Stop reason: HOSPADM

## 2019-08-06 RX ORDER — PROPOFOL 10 MG/ML
VIAL (ML) INTRAVENOUS CONTINUOUS PRN
Status: DISCONTINUED | OUTPATIENT
Start: 2019-08-06 | End: 2019-08-06

## 2019-08-06 RX ORDER — PHENAZOPYRIDINE HYDROCHLORIDE 100 MG/1
100 TABLET, FILM COATED ORAL 3 TIMES DAILY PRN
Qty: 15 TABLET | Refills: 0 | Status: SHIPPED | OUTPATIENT
Start: 2019-08-06 | End: 2019-08-11

## 2019-08-06 RX ORDER — CEFAZOLIN SODIUM 1 G/3ML
2 INJECTION, POWDER, FOR SOLUTION INTRAMUSCULAR; INTRAVENOUS
Status: COMPLETED | OUTPATIENT
Start: 2019-08-06 | End: 2019-08-06

## 2019-08-06 RX ORDER — TAMSULOSIN HYDROCHLORIDE 0.4 MG/1
0.4 CAPSULE ORAL DAILY
Qty: 30 CAPSULE | Refills: 0 | Status: SHIPPED | OUTPATIENT
Start: 2019-08-06 | End: 2020-11-09

## 2019-08-06 RX ORDER — MIDAZOLAM HYDROCHLORIDE 1 MG/ML
INJECTION, SOLUTION INTRAMUSCULAR; INTRAVENOUS
Status: DISCONTINUED | OUTPATIENT
Start: 2019-08-06 | End: 2019-08-06

## 2019-08-06 RX ORDER — LIDOCAINE HYDROCHLORIDE 10 MG/ML
INJECTION, SOLUTION EPIDURAL; INFILTRATION; INTRACAUDAL; PERINEURAL
Status: DISCONTINUED
Start: 2019-08-06 | End: 2019-08-06 | Stop reason: HOSPADM

## 2019-08-06 RX ORDER — SODIUM CHLORIDE 9 MG/ML
INJECTION, SOLUTION INTRAVENOUS CONTINUOUS PRN
Status: DISCONTINUED | OUTPATIENT
Start: 2019-08-06 | End: 2019-08-06

## 2019-08-06 RX ORDER — HYDROCODONE BITARTRATE AND ACETAMINOPHEN 5; 325 MG/1; MG/1
1 TABLET ORAL EVERY 6 HOURS PRN
Qty: 11 TABLET | Refills: 0 | Status: SHIPPED | OUTPATIENT
Start: 2019-08-06 | End: 2020-11-09

## 2019-08-06 RX ORDER — FENTANYL CITRATE 50 UG/ML
25 INJECTION, SOLUTION INTRAMUSCULAR; INTRAVENOUS EVERY 5 MIN PRN
Status: DISCONTINUED | OUTPATIENT
Start: 2019-08-06 | End: 2019-08-06 | Stop reason: HOSPADM

## 2019-08-06 RX ORDER — IBUPROFEN 800 MG/1
800 TABLET ORAL EVERY 8 HOURS PRN
Qty: 21 TABLET | Refills: 0 | Status: SHIPPED | OUTPATIENT
Start: 2019-08-06 | End: 2019-08-13

## 2019-08-06 RX ORDER — SULFAMETHOXAZOLE AND TRIMETHOPRIM 800; 160 MG/1; MG/1
1 TABLET ORAL 2 TIMES DAILY
Qty: 16 TABLET | Refills: 0 | Status: SHIPPED | OUTPATIENT
Start: 2019-08-06 | End: 2019-08-14

## 2019-08-06 RX ORDER — OXYBUTYNIN CHLORIDE 5 MG/1
TABLET ORAL
Status: DISCONTINUED
Start: 2019-08-06 | End: 2019-08-06 | Stop reason: HOSPADM

## 2019-08-06 RX ORDER — LIDOCAINE HYDROCHLORIDE 20 MG/ML
JELLY TOPICAL
Status: DISCONTINUED | OUTPATIENT
Start: 2019-08-06 | End: 2019-08-06 | Stop reason: HOSPADM

## 2019-08-06 RX ADMIN — MIDAZOLAM HYDROCHLORIDE 2 MG: 1 INJECTION, SOLUTION INTRAMUSCULAR; INTRAVENOUS at 09:08

## 2019-08-06 RX ADMIN — PROPOFOL 50 MG: 10 INJECTION, EMULSION INTRAVENOUS at 09:08

## 2019-08-06 RX ADMIN — CEFAZOLIN 2 G: 330 INJECTION, POWDER, FOR SOLUTION INTRAMUSCULAR; INTRAVENOUS at 09:08

## 2019-08-06 RX ADMIN — PROPOFOL 125 MCG/KG/MIN: 10 INJECTION, EMULSION INTRAVENOUS at 09:08

## 2019-08-06 RX ADMIN — OXYBUTYNIN CHLORIDE 5 MG: 5 TABLET ORAL at 11:08

## 2019-08-06 RX ADMIN — SODIUM CHLORIDE: 0.9 INJECTION, SOLUTION INTRAVENOUS at 09:08

## 2019-08-06 RX ADMIN — LIDOCAINE HYDROCHLORIDE 100 MG: 20 INJECTION, SOLUTION INTRAVENOUS at 09:08

## 2019-08-06 NOTE — DISCHARGE SUMMARY
[facility]  Discharge Note  Short Stay    Admit Date: 8/6/2019    Discharge Date and Time: 08/06/2019 9:59 AM      Attending Physician: Kayode Zabala MD     Discharge Provider: Jovi Benavidez    Diagnoses:  Active Hospital Problems    Diagnosis  POA    *BPH with obstruction/lower urinary tract symptoms [N40.1, N13.8]  Yes      Resolved Hospital Problems   No resolved problems to display.       Discharged Condition: good    Hospital Course: Patient was admitted for Rezum and tolerated the procedure well with no complications. The patient was discharged home in good condition on the same day.       Final Diagnoses: Same as principal problem.    Disposition: Home or Self Care    Follow up/Patient Instructions:    Medications:  Reconciled Home Medications:   Current Discharge Medication List      START taking these medications    Details   HYDROcodone-acetaminophen (NORCO) 5-325 mg per tablet Take 1 tablet by mouth every 6 (six) hours as needed for Pain.  Qty: 11 tablet, Refills: 0      ibuprofen (ADVIL,MOTRIN) 800 MG tablet Take 1 tablet (800 mg total) by mouth every 8 (eight) hours as needed for Pain.  Qty: 21 tablet, Refills: 0      oxybutynin (DITROPAN) 5 MG Tab Take 1 tablet (5 mg total) by mouth 3 (three) times daily. for 15 doses  Qty: 15 tablet, Refills: 0      phenazopyridine (PYRIDIUM) 100 MG tablet Take 1 tablet (100 mg total) by mouth 3 (three) times daily as needed for Pain (Pain with urination).  Qty: 15 tablet, Refills: 0      sulfamethoxazole-trimethoprim 800-160mg (BACTRIM DS) 800-160 mg Tab Take 1 tablet by mouth 2 (two) times daily. for 8 days  Qty: 16 tablet, Refills: 0      tamsulosin (FLOMAX) 0.4 mg Cap Take 1 capsule (0.4 mg total) by mouth once daily.  Qty: 30 capsule, Refills: 0         CONTINUE these medications which have NOT CHANGED    Details   aspirin (ECOTRIN) 81 MG EC tablet Take 81 mg by mouth once daily.      fish oil-omega-3 fatty acids 300-1,000 mg capsule Take by  mouth once daily.      multivitamin (ONE DAILY MULTIVITAMIN) per tablet Take 1 tablet by mouth once daily.      atorvastatin (LIPITOR) 20 MG tablet Take 1 tablet (20 mg total) by mouth once daily.  Qty: 30 tablet, Refills: 11      metoprolol succinate (TOPROL-XL) 25 MG 24 hr tablet Take 1 tablet (25 mg total) by mouth once daily.  Qty: 30 tablet, Refills: 11    Associated Diagnoses: NSVT (nonsustained ventricular tachycardia)      zolpidem (AMBIEN) 10 mg Tab Take 1 tablet (10 mg total) by mouth nightly as needed.  Qty: 15 tablet, Refills: 0           Discharge Procedure Orders   Diet Adult Regular     No driving until:   Order Comments: Off narcotics     Notify your health care provider if you experience any of the following:  temperature >100.4     Notify your health care provider if you experience any of the following:  persistent nausea and vomiting or diarrhea     Notify your health care provider if you experience any of the following:  severe uncontrolled pain     Notify your health care provider if you experience any of the following:  difficulty breathing or increased cough     Notify your health care provider if you experience any of the following:  severe persistent headache     Notify your health care provider if you experience any of the following:  worsening rash     Notify your health care provider if you experience any of the following:  persistent dizziness, light-headedness, or visual disturbances     Notify your health care provider if you experience any of the following:  increased confusion or weakness     Activity as tolerated     Follow-up Information     Suzie Arreola PA-C On 8/13/2019.    Specialty:  Urology  Why:  Albright removal and voiding trial.  Contact information:  [address] [place] [id number]  [phone]             Kayode Zabala MD On 11/11/2019.    Specialty:  Urology  Why:  3 month follow-up.  Contact information:  [address]  [place]  [address]  [phone]                   Discharge Procedure Orders (must include Diet, Follow-up, Activity):   Discharge Procedure Orders (must include Diet, Follow-up, Activity)   Diet Adult Regular     No driving until:   Order Comments: Off narcotics     Notify your health care provider if you experience any of the following:  temperature >100.4     Notify your health care provider if you experience any of the following:  persistent nausea and vomiting or diarrhea     Notify your health care provider if you experience any of the following:  severe uncontrolled pain     Notify your health care provider if you experience any of the following:  difficulty breathing or increased cough     Notify your health care provider if you experience any of the following:  severe persistent headache     Notify your health care provider if you experience any of the following:  worsening rash     Notify your health care provider if you experience any of the following:  persistent dizziness, light-headedness, or visual disturbances     Notify your health care provider if you experience any of the following:  increased confusion or weakness     Activity as tolerated

## 2019-08-06 NOTE — ANESTHESIA POSTPROCEDURE EVALUATION
Anesthesia Post Evaluation    Patient: Jovi Pacheco    Procedure(s) Performed: Procedure(s) (LRB):  DESTRUCTION, PROSTATE, TRANSURETHRAL (N/A)    Final Anesthesia Type: MAC  Patient location during evaluation: PACU  Patient participation: Yes- Able to Participate  Level of consciousness: awake and alert  Post-procedure vital signs: reviewed and stable  Pain management: adequate  Airway patency: patent  PONV status at discharge: No PONV  Anesthetic complications: no      Cardiovascular status: hemodynamically stable  Respiratory status: room air, spontaneous ventilation and unassisted  Hydration status: euvolemic  Follow-up not needed.          Vitals Value Taken Time   /74 8/6/2019 11:16 AM   Temp 36.6 °C (97.9 °F) 8/6/2019 11:15 AM   Pulse 51 8/6/2019 11:21 AM   Resp 29 8/6/2019 11:21 AM   SpO2 99 % 8/6/2019 11:21 AM   Vitals shown include unvalidated device data.      No case tracking events are documented in the log.      Pain/Esperanza Score: Esperanza Score: 10 (8/6/2019 11:18 AM)

## 2019-08-06 NOTE — OP NOTE
[facility]  Operative Note     Date: 08/06/2019     Pre-Op Diagnosis: BPH with obstructive urinary symptoms    Patient Active Problem List   Diagnosis    Allergic rhinitis    Stenosis of left carotid artery    BPH with urinary obstruction    NSVT (nonsustained ventricular tachycardia)    Elevated prostate specific antigen (PSA)    Positive cardiac stress test    BPH with obstruction/lower urinary tract symptoms     Post-Op Diagnosis: same     Procedure(s) Performed:   1. Transurethral destruction of prostate; radiofrequency thermotherapy      Specimen(s): none     Staff Surgeon: Kayode Zabala MD     Assistant Surgeon: Jovi Benavidez MD     Anesthesia:  Monitored Local Anesthesia with Sedation     Indications: Jovi Pacheco is a 59 y.o. male with BPH presenting for surgical intervention.     Findings:    1. Moderate-severe trilobar hypertrophy  2. Prostatic length 2.5 cm  3. 8 total treatments delivered: 3 to each lateral lobe and 2 to median lobe     Estimated Blood Loss: minimal     Drains:   1. 18 Fr acevedo catheter     Procedure in detail: After informed consent was obtained and all questions were answered, the patient was brought to the operating suite and placed in the lithotomy position. General anesthesia was administered. SCDs were applied and working prior to induction of anesthesia. That patient was then prepped and draped in the usual sterile fashion. Time out was performed and preoperative antibiotics were confirmed.     We began the case by inserting the Rezum scope into the bladder. No urethral strictures were seen and scope entered easily.     Next, we examined the prostate and found moderate-severe trilobar hypertrophy. The prostatic length was measured to be 2.5 cm. RF was then used to create thermal energy to selectively ablate prostate tissue 1 cm from the bladder neck to the proximal end of the veru spaced 1 cm apart.      8 total treatments were given.  Specifically 3 treatments were given in each to 3 and 9 o'clock positions, and 2 to the median lobe.     At the completion of the procedure the device was removed. There was a careful check that there were no clots in the bladder or injury to the bladder, prostate or urethra.      18 fr acevedo catheter was placed with 10 mL of sterile water in the balloon     The patient tolerated the procedure well and was transferred to the PACU in stable condition.     Disposition:  The patient will be discharged home with 1 week of antibiotic therapy, 5 days of pyridium, 5 days of ditropan, 1 week of NSAIDS, and pain medications. He will follow up with an [facility] clinic in one week to have his acevedo catheter removed.

## 2019-08-06 NOTE — PLAN OF CARE
Discharge instructions reviewed w/ pt and friend, verbalized understanding, verbalized acevedo care. Pt in NADN. Denies pain, just urge to urinate. States feels ok to go home, acevedo draining well. Tolerated liquids w/ no issues. To be d/c'd home w/ friend.

## 2019-08-06 NOTE — TRANSFER OF CARE
"Anesthesia Transfer of Care Note    Patient: Jovi Pacheco    Procedure(s) Performed: Procedure(s) (LRB):  DESTRUCTION, PROSTATE, TRANSURETHRAL (N/A)    Patient location: PACU    Anesthesia Type: MAC    Transport from OR: Transported from OR on room air with adequate spontaneous ventilation    Post pain: adequate analgesia    Post assessment: no apparent anesthetic complications and tolerated procedure well    Post vital signs: stable    Level of consciousness: awake    Nausea/Vomiting: no nausea/vomiting    Complications: none    Transfer of care protocol was followedComments: Report given to PACU RN      Last vitals: 0952  Visit Vitals  BP (!) 95/54   Pulse 65   Temp 36.6 °C (97.9 °F) (Skin)   Resp 16   Ht 6' 1" (1.854 m)   Wt 84.4 kg (186 lb)   SpO2 (!) 94%   BMI 24.54 kg/m²     "

## 2019-08-06 NOTE — H&P
Urology ([facility]) H&P  Staff: Kayode Zabala MD    Is this patient in a research study?  No    CC: BPH with LUTS    HPI:  Jovi Pacheco is a 59 y.o. male with a history of BPH with LUTS and elevated s/p negative TRUS on 2/8/19 who presents for RUST. He had a TRUS on 2/8/19 which showed 59.3 g prostate with large median lobe. Cysto 2/8/19 showed trilobar hypertrophy. Prostatic length 5 cm. Has not taken ASA 81 mg for over a week.    ROS:  Neg except per HPI    Past Medical History:   Diagnosis Date    Allergic rhinitis 12/17/2018    Family history of atherosclerosis     History of colon polyps 1/24/2019    Keloid cicatrix     S/P right inguinal hernia repair with mesh 9/1/2017       Past Surgical History:   Procedure Laterality Date    ANGIOGRAM, CORONARY ARTERY Bilateral 2/14/2019    Performed by [facility]osa [facility] at [facility] CATH LAB    COLONOSCOPY N/A 1/24/2019    Performed by eMkhi Montez MD at [facility] ENDO (4TH FLR)    EYE SURGERY      HERNIA REPAIR Right     inguinal    Left heart cath Left 2/14/2019    Performed by [facility]osa [facility] at [facility] CATH LAB    REPAIR-HERNIA-INGUINAL-INITIAL (5 YRS+) Open with Mesh Right 9/1/2017    Performed by Joshua Goldberg, MD at [facility] OR 2ND FLR       Social History     Socioeconomic History    Marital status:      Spouse name: Not on file    Number of children: Not on file    Years of education: Not on file    Highest education level: Not on file   Occupational History    Not on file   Social Needs    Financial resource strain: Not on file    Food insecurity:     Worry: Not on file     Inability: Not on file    Transportation needs:     Medical: Not on file     Non-medical: Not on file   Tobacco Use    Smoking status: Never Smoker    Smokeless tobacco: Never Used   Substance and Sexual Activity    Alcohol use: Yes     Alcohol/week: 1.2 oz     Types: 1 Glasses of wine, 1 Cans of beer per week     Comment: limited/sporadic    Drug  use: No    Sexual activity: Not on file   Lifestyle    Physical activity:     Days per week: Not on file     Minutes per session: Not on file    Stress: Not on file   Relationships    Social connections:     Talks on phone: Not on file     Gets together: Not on file     Attends [employer] service: Not on file     Active member of club or organization: Not on file     Attends meetings of clubs or organizations: Not on file     Relationship status: Not on file   Other Topics Concern    Not on file   Social History Narrative    Not on file       Family History   Problem Relation Age of Onset    Heart disease Mother     Stroke Mother     Cancer Father     No Known Problems Brother     Melanoma Neg Hx        Review of patient's allergies indicates:   Allergen Reactions    Cat/feline products     Dog dander Other (See Comments)    Peanut     Egg derived Other (See Comments)     Runny nose       No current facility-administered medications on file prior to encounter.      Current Outpatient Medications on File Prior to Encounter   Medication Sig Dispense Refill    aspirin (ECOTRIN) 81 MG EC tablet Take 81 mg by mouth once daily.      atorvastatin (LIPITOR) 20 MG tablet Take 1 tablet (20 mg total) by mouth once daily. 30 tablet 11    fish oil-omega-3 fatty acids 300-1,000 mg capsule Take by mouth once daily.      metoprolol succinate (TOPROL-XL) 25 MG 24 hr tablet Take 1 tablet (25 mg total) by mouth once daily. 30 tablet 11    multivitamin (ONE DAILY MULTIVITAMIN) per tablet Take 1 tablet by mouth once daily.      zolpidem (AMBIEN) 10 mg Tab Take 1 tablet (10 mg total) by mouth nightly as needed. 15 tablet 0       Anticoagulation:  Yes - ASA 81    Physical Exam:  General: No acute distress, well developed. AAOx3  Head: Normocephalic, Atraumatic  Eyes: Extra-occular movements intact, No discharge  Neck: supple, symmetrical, trachea midline  Lungs: normal respiratory effort, no respiratory distress, no  wheezes  CV: regular rate, 2+ pulses  Abdomen: soft, non-tender, non-distended, no organomegaly  MSK: no edema, no deformities, normal ROM  Skin: skin color, texture, turgor normal.  Neurologic: no focal deficits, sensation intact    Labs:    Urine dipstick today - Negative for all tested components.    Lab Results   Component Value Date    WBC 7.01 07/29/2019    HGB 16.2 07/29/2019    HCT 47.9 07/29/2019    MCV 93 07/29/2019     07/29/2019       BMP  Lab Results   Component Value Date     07/29/2019    K 4.1 07/29/2019     07/29/2019    CO2 25 07/29/2019    BUN 21 07/29/2019    CREATININE 0.9 07/29/2019    CALCIUM 9.4 07/29/2019    ANIONGAP 10 07/29/2019    ESTGFRAFRICA >60.0 07/29/2019    EGFRNONAA >60.0 07/29/2019       Lab Results   Component Value Date    PSA 4.6 (H) 12/17/2018    PSA 2.6 02/11/2010     Assessment: Jovi Pacheco is a 59 y.o. male with a history of BPH with LUTS and elevated s/p negative TRUS on 2/8/19 who presents for Rezum.    Plan:     1. To OR today for Rezum.  2. Consents signed   3. I have explained the risk, benefits, and alternatives of the procedure in detail. The patient voices understanding and all questions have been answered. The patient agrees to proceed as planned.     Jovi Benavidez MD

## 2019-08-06 NOTE — DISCHARGE INSTRUCTIONS
Your prescriptions were sent to the [facility] pharmacy.    Post Cystoscopy Instructions  Do not strain to have a bowel movement  No strenuous exercise x 7 days  No driving while you are on narcotic pain medications or if your acevedo  catheter is in place    You can expect:  To pass stone fragments if you had a stone procedure  Have pain when you void from your stent if you have a stent in place  See blood in your urine if you have a stent in place    If you have a catheter, please return to the ER if your catheter stops draining or you are having abdominal pain.    Call the doctor if:   Temperature is greater than 101F   Persistent vomiting and inability to keep food down   Inability to void if you do not have a catheter

## 2019-08-13 ENCOUNTER — TELEPHONE (OUTPATIENT)
Dept: UROLOGY | Facility: CLINIC | Age: 59
End: 2019-08-13

## 2019-08-13 ENCOUNTER — OFFICE VISIT (OUTPATIENT)
Dept: UROLOGY | Facility: CLINIC | Age: 59
End: 2019-08-13
Payer: COMMERCIAL

## 2019-08-13 VITALS — HEART RATE: 42 BPM | DIASTOLIC BLOOD PRESSURE: 73 MMHG | SYSTOLIC BLOOD PRESSURE: 110 MMHG

## 2019-08-13 DIAGNOSIS — Z46.6 ENCOUNTER FOR FOLEY CATHETER REMOVAL: ICD-10-CM

## 2019-08-13 DIAGNOSIS — Z98.890 POST-OPERATIVE STATE: Primary | ICD-10-CM

## 2019-08-13 PROCEDURE — 99999 PR PBB SHADOW E&M-EST. PATIENT-LVL III: CPT | Mod: PBBFAC,,, | Performed by: PHYSICIAN ASSISTANT

## 2019-08-13 PROCEDURE — 99024 POSTOP FOLLOW-UP VISIT: CPT | Mod: S$GLB,,, | Performed by: PHYSICIAN ASSISTANT

## 2019-08-13 PROCEDURE — 99024 PR POST-OP FOLLOW-UP VISIT: ICD-10-PCS | Mod: S$GLB,,, | Performed by: PHYSICIAN ASSISTANT

## 2019-08-13 PROCEDURE — 51700 IRRIGATION OF BLADDER: CPT | Mod: S$GLB,,, | Performed by: PHYSICIAN ASSISTANT

## 2019-08-13 PROCEDURE — 51700 PR IRRIGATION, BLADDER: ICD-10-PCS | Mod: S$GLB,,, | Performed by: PHYSICIAN ASSISTANT

## 2019-08-13 PROCEDURE — 99999 PR PBB SHADOW E&M-EST. PATIENT-LVL III: ICD-10-PCS | Mod: PBBFAC,,, | Performed by: PHYSICIAN ASSISTANT

## 2019-08-13 RX ORDER — UBIDECARENONE 30 MG
30 CAPSULE ORAL 3 TIMES DAILY
COMMUNITY

## 2019-08-13 NOTE — PROGRESS NOTES
CHIEF COMPLAINT:    Mr. Pacheco is a 59 y.o. male presenting for voiding trial.  PRESENTING ILLNESS:    Jovi Pacheco is a 59 y.o. male who presents for voiding trial.    He had the following procedure on 8/6/19:  Procedure(s) Performed:   1. Transurethral destruction of prostate; radiofrequency thermotherapy    Staff Surgeon: Kayode Zabala MD   Findings:    1. Moderate-severe trilobar hypertrophy  2. Prostatic length 2.5 cm  3. 8 total treatments delivered: 3 to each lateral lobe and 2 to median lobe    His catheter has been draining well.  He denies fevers and chills.   He is taking bactrim. He did not have any pain so he did not take the pain medication.  He only experienced some discomfort.          PATIENT HISTORY:    Past Medical History:   Diagnosis Date    Allergic rhinitis 12/17/2018    Family history of atherosclerosis     History of colon polyps 1/24/2019    Keloid cicatrix     S/P right inguinal hernia repair with mesh 9/1/2017       Past Surgical History:   Procedure Laterality Date    ANGIOGRAM, CORONARY ARTERY Bilateral 2/14/2019    Performed by Hayden Barbosa MD at [facility] CATH LAB    COLONOSCOPY N/A 1/24/2019    Performed by Mekhi Montez MD at [facility] ENDO (4TH FLR)    DESTRUCTION, PROSTATE, TRANSURETHRAL N/A 8/6/2019    Performed by Kayode Zabala MD at [facility] OR 1ST FLR    EYE SURGERY      HERNIA REPAIR Right     inguinal    Left heart cath Left 2/14/2019    Performed by Hayden Barbosa MD at [facility] CATH LAB    REPAIR-HERNIA-INGUINAL-INITIAL (5 YRS+) Open with Mesh Right 9/1/2017    Performed by Joshua Goldberg, MD at [facility] OR 2ND FLR       Family History   Problem Relation Age of Onset    Heart disease Mother     Stroke Mother     Cancer Father     No Known Problems Brother     Melanoma Neg Hx        Social History     Socioeconomic History    Marital status:      Spouse name: Not on file    Number of children: Not on file    Years of education: Not on  file    Highest education level: Not on file   Occupational History    Not on file   Social Needs    Financial resource strain: Not on file    Food insecurity:     Worry: Not on file     Inability: Not on file    Transportation needs:     Medical: Not on file     Non-medical: Not on file   Tobacco Use    Smoking status: Never Smoker    Smokeless tobacco: Never Used   Substance and Sexual Activity    Alcohol use: Yes     Alcohol/week: 1.2 oz     Types: 1 Glasses of wine, 1 Cans of beer per week     Comment: limited/sporadic    Drug use: No    Sexual activity: Not on file   Lifestyle    Physical activity:     Days per week: Not on file     Minutes per session: Not on file    Stress: Not on file   Relationships    Social connections:     Talks on phone: Not on file     Gets together: Not on file     Attends Yarsani service: Not on file     Active member of club or organization: Not on file     Attends meetings of clubs or organizations: Not on file     Relationship status: Not on file   Other Topics Concern    Not on file   Social History Narrative    Not on file       Allergies:  Cat/feline products; Dog dander; Peanut; and Egg derived    Medications:    Current Outpatient Medications:     aspirin (ECOTRIN) 81 MG EC tablet, Take 81 mg by mouth once daily., Disp: , Rfl:     co-enzyme Q-10 30 mg capsule, Take 30 mg by mouth 3 (three) times daily., Disp: , Rfl:     fish oil-omega-3 fatty acids 300-1,000 mg capsule, Take by mouth once daily., Disp: , Rfl:     sulfamethoxazole-trimethoprim 800-160mg (BACTRIM DS) 800-160 mg Tab, Take 1 tablet by mouth 2 (two) times daily for 8 days, Disp: 16 tablet, Rfl: 0    tamsulosin (FLOMAX) 0.4 mg Cap, Take 1 capsule (0.4 mg total) by mouth once daily., Disp: 30 capsule, Rfl: 0    zolpidem (AMBIEN) 10 mg Tab, Take 1 tablet (10 mg total) by mouth nightly as needed., Disp: 15 tablet, Rfl: 0    atorvastatin (LIPITOR) 20 MG tablet, Take 1 tablet (20 mg total) by  mouth once daily., Disp: 30 tablet, Rfl: 11    HYDROcodone-acetaminophen (NORCO) 5-325 mg per tablet, Take 1 tablet by mouth every 6 (six) hours as needed for Pain., Disp: 11 tablet, Rfl: 0    ibuprofen (ADVIL,MOTRIN) 800 MG tablet, Take 1 tablet (800 mg total) by mouth every 8 (eight) hours as needed for Pain., Disp: 21 tablet, Rfl: 0    metoprolol succinate (TOPROL-XL) 25 MG 24 hr tablet, Take 1 tablet (25 mg total) by mouth once daily., Disp: 30 tablet, Rfl: 11    multivitamin (ONE DAILY MULTIVITAMIN) per tablet, Take 1 tablet by mouth once daily., Disp: , Rfl:     oxybutynin (DITROPAN) 5 MG Tab, Take 1 tablet (5 mg total) by mouth 3 (three) times daily for 15 doses, Disp: 15 tablet, Rfl: 0    PHYSICAL EXAMINATION:    Constitutional: He appears well-developed and well-nourished.  He is in no apparent distress.    Eyes: No scleral icterus noted bilaterally. No discharge bilaterally.    Nose: No rhinorrhea    Cardiovascular: Normal rate.      Pulmonary/Chest: Effort normal. No respiratory distress.     Abdominal:  He exhibits no distension.      Neurological: He is alert and oriented to person, place, and time.     Psych: Cooperative with normal affect.        IMPRESSION:    Encounter Diagnoses   Name Primary?    Post-operative state Yes    Encounter for Acevedo catheter removal          PLAN:    Voiding trial performed by Nurse Vera.  120ml of sterile water was instilled into bladder.  Acevedo catheter was removed. Patient urinated 130ml without difficulty.    Voiding trial passed  Patient was instructed to drink plenty of fluids today.  Instructed patient to call at 2p.m. to give an update on urine output.  I instructed patient to return to clinic or emergency department (if after clinic hours) to have acevedo catheter put back in if unable to urinate within 5 hours of acevedo catheter removal or starts to experience bladder pressure/pain, decrease flow, straining/difficulty urinating, urinary frequency.     Patient voiced understanding.    Continue flomax and bactrim.    Follow up as scheduled with Dr. Zabala in November.

## 2019-08-13 NOTE — PATIENT INSTRUCTIONS
Drink plenty of fluids today.  Call at 2p.m. to give an update on urine output.  Return to clinic or emergency department (if after clinic hours) to have acevedo catheter put back in if unable to urinate within 5 hours of acevedo catheter removal or starts to experience bladder pressure/pain, decrease flow, straining/difficulty urinating, urinary frequency.

## 2019-08-13 NOTE — TELEPHONE ENCOUNTER
Spoke with pt and he informed that he is urinating without difficulty. Instructed him to call back if he has any issues. Understanding verbalized.  Informed LUISA Larsen     ----- Message from Sandi King sent at 8/13/2019  1:06 PM CDT -----  Contact: pt @ [phone]  Calling to speak with someone in Dr. Moya's office to give an update on his condition. Please call.

## 2019-11-11 ENCOUNTER — OFFICE VISIT (OUTPATIENT)
Dept: UROLOGY | Facility: CLINIC | Age: 59
End: 2019-11-11
Payer: COMMERCIAL

## 2019-11-11 VITALS
BODY MASS INDEX: 25.42 KG/M2 | WEIGHT: 191.81 LBS | SYSTOLIC BLOOD PRESSURE: 116 MMHG | HEART RATE: 62 BPM | HEIGHT: 73 IN | DIASTOLIC BLOOD PRESSURE: 74 MMHG

## 2019-11-11 DIAGNOSIS — N13.8 BPH WITH URINARY OBSTRUCTION: Primary | ICD-10-CM

## 2019-11-11 DIAGNOSIS — N40.1 BPH WITH URINARY OBSTRUCTION: Primary | ICD-10-CM

## 2019-11-11 PROCEDURE — 99999 PR PBB SHADOW E&M-EST. PATIENT-LVL III: ICD-10-PCS | Mod: PBBFAC,,, | Performed by: UROLOGY

## 2019-11-11 PROCEDURE — 3008F PR BODY MASS INDEX (BMI) DOCUMENTED: ICD-10-PCS | Mod: CPTII,S$GLB,, | Performed by: UROLOGY

## 2019-11-11 PROCEDURE — 99213 OFFICE O/P EST LOW 20 MIN: CPT | Mod: S$GLB,,, | Performed by: UROLOGY

## 2019-11-11 PROCEDURE — 99213 PR OFFICE/OUTPT VISIT, EST, LEVL III, 20-29 MIN: ICD-10-PCS | Mod: S$GLB,,, | Performed by: UROLOGY

## 2019-11-11 PROCEDURE — 99999 PR PBB SHADOW E&M-EST. PATIENT-LVL III: CPT | Mod: PBBFAC,,, | Performed by: UROLOGY

## 2019-11-11 PROCEDURE — 3008F BODY MASS INDEX DOCD: CPT | Mod: CPTII,S$GLB,, | Performed by: UROLOGY

## 2019-11-11 RX ORDER — TRIAMCINOLONE ACETONIDE 1 MG/G
CREAM TOPICAL
Refills: 1 | COMMUNITY
Start: 2019-09-20 | End: 2020-11-09

## 2019-11-11 NOTE — PROGRESS NOTES
CHIEF COMPLAINT:    Mr. Pacheco is a 59 y.o. male presenting with an elevated PSA and LUTS.    PRESENTING ILLNESS:    Jovi Pacheco is a 59 y.o. male who c/o LUTS.  He's s/p rezum on 8/6/19.  He's voiding better, but it's not as good as he thought it would be.  He has a better FOS.  Better feeling of emptying.  Nocturia x 2.   No hematuria.  No dysuria.      He has some mild ED, but it's not bothersome.    He has a history of an elevated PSA and underwent a negative TRUS bx when his PSA was 4.2.    REVIEW OF SYSTEMS:    Jovi Pacheco denies headache, blurred vision, fever, nausea, vomiting, chills, abdominal pain, bleeding per rectum, cough, SOB, recent loss of consciousness, recent mental status changes, seizures, dizziness, or upper or lower extremity weakness.    FREDERICK  1. 4  2. 5  3. 5  4. 5  5. 5       PATIENT HISTORY:    Past Medical History:   Diagnosis Date    Allergic rhinitis 12/17/2018    Family history of atherosclerosis     History of colon polyps 1/24/2019    Keloid cicatrix     S/P right inguinal hernia repair with mesh 9/1/2017       Past Surgical History:   Procedure Laterality Date    COLONOSCOPY N/A 1/24/2019    Procedure: COLONOSCOPY;  Surgeon: Mekhi Montez MD;  Location: [address]);  Service: Endoscopy;  Laterality: N/A;    CORONARY ANGIOGRAPHY Bilateral 2/14/2019    Procedure: ANGIOGRAM, CORONARY ARTERY;  Surgeon: Hayden Barbosa MD;  Location: [facility] CATH LAB;  Service: Cardiology;  Laterality: Bilateral;    EYE SURGERY      HERNIA REPAIR Right     inguinal    LEFT HEART CATHETERIZATION Left 2/14/2019    Procedure: Left heart cath;  Surgeon: Hayden Barbosa MD;  Location: [facility] CATH LAB;  Service: Cardiology;  Laterality: Left;       Family History   Problem Relation Age of Onset    Heart disease Mother     Stroke Mother     Cancer Father     No Known Problems Brother     Melanoma Neg Hx        Social History     Socioeconomic History     Marital status:      Spouse name: Not on file    Number of children: Not on file    Years of education: Not on file    Highest education level: Not on file   Occupational History    Not on file   Social Needs    Financial resource strain: Not on file    Food insecurity:     Worry: Not on file     Inability: Not on file    Transportation needs:     Medical: Not on file     Non-medical: Not on file   Tobacco Use    Smoking status: Never Smoker    Smokeless tobacco: Never Used   Substance and Sexual Activity    Alcohol use: Yes     Alcohol/week: 2.0 standard drinks     Types: 1 Glasses of wine, 1 Cans of beer per week     Comment: limited/sporadic    Drug use: No    Sexual activity: Not on file   Lifestyle    Physical activity:     Days per week: Not on file     Minutes per session: Not on file    Stress: Not on file   Relationships    Social connections:     Talks on phone: Not on file     Gets together: Not on file     Attends [employer] service: Not on file     Active member of club or organization: Not on file     Attends meetings of clubs or organizations: Not on file     Relationship status: Not on file   Other Topics Concern    Not on file   Social History Narrative    Not on file       Allergies:  Cat/feline products; Dog dander; Peanut; and Egg derived    Medications:    Current Outpatient Medications:     aspirin (ECOTRIN) 81 MG EC tablet, Take 81 mg by mouth once daily., Disp: , Rfl:     atorvastatin (LIPITOR) 20 MG tablet, Take 1 tablet (20 mg total) by mouth once daily., Disp: 30 tablet, Rfl: 11    co-enzyme Q-10 30 mg capsule, Take 30 mg by mouth 3 (three) times daily., Disp: , Rfl:     fish oil-omega-3 fatty acids 300-1,000 mg capsule, Take by mouth once daily., Disp: , Rfl:     HYDROcodone-acetaminophen (NORCO) 5-325 mg per tablet, Take 1 tablet by mouth every 6 (six) hours as needed for Pain., Disp: 11 tablet, Rfl: 0    metoprolol succinate (TOPROL-XL) 25 MG 24 hr tablet,  Take 1 tablet (25 mg total) by mouth once daily., Disp: 30 tablet, Rfl: 11    multivitamin (ONE DAILY MULTIVITAMIN) per tablet, Take 1 tablet by mouth once daily., Disp: , Rfl:     triamcinolone acetonide 0.1% (KENALOG) 0.1 % cream, FRANCOISE BID TO PSORIASIS FLARES UTD, Disp: , Rfl: 1    zolpidem (AMBIEN) 10 mg Tab, Take 1 tablet (10 mg total) by mouth nightly as needed., Disp: 15 tablet, Rfl: 0    oxybutynin (DITROPAN) 5 MG Tab, Take 1 tablet (5 mg total) by mouth 3 (three) times daily for 15 doses, Disp: 15 tablet, Rfl: 0    tamsulosin (FLOMAX) 0.4 mg Cap, Take 1 capsule (0.4 mg total) by mouth once daily., Disp: 30 capsule, Rfl: 0    PHYSICAL EXAMINATION:    The patient generally appears in good health, is appropriately interactive, and is in no apparent distress.     Eyes: anicteric sclerae, moist conjunctivae; no lid-lag; PERRLA     HENT: Atraumatic; oropharynx clear with moist mucous membranes and no mucosal ulcerations;normal hard and soft palate.  No evidence of lymphadenopathy.    Neck: Trachea midline.  No thyromegaly.    Musculoskeletal: No abnormal gait.    Skin: No lesions.    Mental: Cooperative with normal affect.  Is oriented to time, place, and person.    Neuro: Grossly intact.    Chest: Normal inspiratory effort.   No accessory muscles.  No audible wheezes.  Respirations symmetric on inspiration and expiration.    Heart: Regular rhythm.      Abdomen:  Soft, non-tender. No masses or organomegaly. Bladder is not palpable. No evidence of flank discomfort. No evidence of inguinal hernia.    Genitourinary: The penis is not circumcised with no evidence of plaques or induration. The urethral meatus is normal. The testes, epididymides, and cord structures are normal in size and contour bilaterally. The scrotum is normal in size and contour.    Normal anal sphincter tone. No rectal mass.    The prostate is 45 g. Normal landmarks. Lateral sulci. Median furrow intact.  No nodularity or induration. Seminal  vesicles are normal.    Extremities: No clubbing, cyanosis, or edema      LABS:    UA dipped negative today  Lab Results   Component Value Date    PSA 4.6 (H) 12/17/2018    PSA 2.6 02/11/2010    PSADIAG 4.6 (H) 07/03/2019    PSADIAG 4.2 (H) 01/25/2019    PSATOTAL 2.5 02/15/2010    PSAFREE 0.27 02/15/2010    PSAFREEPCT 10.80 02/15/2010       IMPRESSION:    Encounter Diagnoses   Name Primary?    BPH with urinary obstruction Yes         PLAN:    1. Will observe his LUTS as they don't bother him.  2. RTC 3 months with a PSA.      Copy to:

## 2020-01-22 NOTE — PROGRESS NOTES
PAST MEDICAL HISTORY:  BPH, status post rezum therapy  Nonsustained VT on stress test, left catheterization  revealing nonobstructive plaque   Allergic rhinitis for which he is seeing an allergist and gets allergy   injections.  Right inguinal hernia repair.  Eye surgery.     SOCIAL HISTORY:  Tobacco use is limited and sporadic.  Exercise, goes to the gym   twice a week.  alcohol use, none.     FAMILY HISTORY:  Mother is , stroke, heart disease, Alzheimer's.  Father   is , unknown type of cancer.  One brother, no known major health   Conditions          REASON FOR VISIT:  This is a 59-year-old male who is coming in for an annual   routine visit.  Overall in general, he has felt well.    In August, he underwent Rezum therapy for BPH.  Over time, he has noted   improvement in his urination where the flow is better.  He is not going as   urgent, will have nocturia x1.    He is also followed by Urology regarding elevated PSA.  His last PSA was in   August.  It was recommended to repeat in six months' time.  PSA was 4.6.    A year ago when he had a stress test that was 6-beat run of nonsustained V-tach.    He underwent a left heart catheterization, which showed nonobstructive   findings.  He was placed on metoprolol.  He may have only taken this for a   month.  Presently, he is not on the medication.    MEDICATIONS:  Ambien half a tablet as needed, which is not frequent, aspirin 81   mg, and multivitamin.    SCREENING:  Colonoscopy in 2019 revealed one adenomatous polyp.    REVIEW OF SYMPTOMS:  Reviewed.  He endorses no chest pain, palpitations,   shortness of breath, or abdominal pain.  The patient has regular bowel function.    No arthralgias, headaches, indigestion or heartburn.    PHYSICAL EXAMINATION:  VITAL SIGNS:  Weight is 194, pulse 68, blood pressure 112/70.  HEENT:  Tympanic membranes normal.  Nasal mucosa is clear.  Oropharynx, no   abnormal findings.  NECK:  No thyromegaly.  No  masses.  LUNGS:  Clear breath sounds, good effort.  HEART:  Regular rate and rhythm.  No murmurs or gallops.  ABDOMEN:  Active bowel sounds, soft, nontender.  No hepatosplenomegaly or   abdominal masses.  PULSES:  2+ carotid pulses.  2+ pedal pulses.  EXTREMITIES:  No edema.    IMPRESSION:  1. General examination.  2. BPH.  3. Elevated PSA.  4. History of adenomatous polyp.    PLAN:  Comprehensive set of labs, flu vaccine.  Today we will also get a PSA   test and then phone review to follow up.        CHINEDU/IN  dd: 01/23/2020 08:27:34 (CST)  td: 01/23/2020 16:58:52 (CST)  Doc ID   #[id number]  Job ID #[id number]    CC:

## 2020-01-23 ENCOUNTER — LAB VISIT (OUTPATIENT)
Dept: LAB | Facility: HOSPITAL | Age: 60
End: 2020-01-23
Attending: INTERNAL MEDICINE
Payer: COMMERCIAL

## 2020-01-23 ENCOUNTER — OFFICE VISIT (OUTPATIENT)
Dept: INTERNAL MEDICINE | Facility: CLINIC | Age: 60
End: 2020-01-23
Payer: COMMERCIAL

## 2020-01-23 ENCOUNTER — IMMUNIZATION (OUTPATIENT)
Dept: INTERNAL MEDICINE | Facility: CLINIC | Age: 60
End: 2020-01-23
Payer: COMMERCIAL

## 2020-01-23 VITALS
DIASTOLIC BLOOD PRESSURE: 74 MMHG | BODY MASS INDEX: 25.71 KG/M2 | WEIGHT: 194 LBS | HEIGHT: 73 IN | SYSTOLIC BLOOD PRESSURE: 128 MMHG | OXYGEN SATURATION: 98 % | HEART RATE: 70 BPM

## 2020-01-23 DIAGNOSIS — Z86.010 HISTORY OF ADENOMATOUS POLYP OF COLON: ICD-10-CM

## 2020-01-23 DIAGNOSIS — Z00.00 ANNUAL PHYSICAL EXAM: ICD-10-CM

## 2020-01-23 DIAGNOSIS — R97.20 PSA ELEVATION: ICD-10-CM

## 2020-01-23 DIAGNOSIS — N40.1 BENIGN PROSTATIC HYPERPLASIA WITH NOCTURIA: ICD-10-CM

## 2020-01-23 DIAGNOSIS — Z00.00 ANNUAL PHYSICAL EXAM: Primary | ICD-10-CM

## 2020-01-23 DIAGNOSIS — N13.8 BPH WITH URINARY OBSTRUCTION: ICD-10-CM

## 2020-01-23 DIAGNOSIS — N40.1 BPH WITH URINARY OBSTRUCTION: ICD-10-CM

## 2020-01-23 DIAGNOSIS — R35.1 BENIGN PROSTATIC HYPERPLASIA WITH NOCTURIA: ICD-10-CM

## 2020-01-23 LAB
ALBUMIN SERPL BCP-MCNC: 4.2 G/DL (ref 3.5–5.2)
ALP SERPL-CCNC: 49 U/L (ref 55–135)
ALT SERPL W/O P-5'-P-CCNC: 38 U/L (ref 10–44)
ANION GAP SERPL CALC-SCNC: 5 MMOL/L (ref 8–16)
AST SERPL-CCNC: 26 U/L (ref 10–40)
BASOPHILS # BLD AUTO: 0.04 K/UL (ref 0–0.2)
BASOPHILS NFR BLD: 0.7 % (ref 0–1.9)
BILIRUB SERPL-MCNC: 0.9 MG/DL (ref 0.1–1)
BUN SERPL-MCNC: 20 MG/DL (ref 8–23)
CALCIUM SERPL-MCNC: 9.6 MG/DL (ref 8.7–10.5)
CHLORIDE SERPL-SCNC: 106 MMOL/L (ref 95–110)
CHOLEST SERPL-MCNC: 197 MG/DL (ref 120–199)
CHOLEST/HDLC SERPL: 3.3 {RATIO} (ref 2–5)
CO2 SERPL-SCNC: 30 MMOL/L (ref 23–29)
COMPLEXED PSA SERPL-MCNC: 3.2 NG/ML (ref 0–4)
CREAT SERPL-MCNC: 0.9 MG/DL (ref 0.5–1.4)
DIFFERENTIAL METHOD: NORMAL
EOSINOPHIL # BLD AUTO: 0.1 K/UL (ref 0–0.5)
EOSINOPHIL NFR BLD: 1.6 % (ref 0–8)
ERYTHROCYTE [DISTWIDTH] IN BLOOD BY AUTOMATED COUNT: 13.4 % (ref 11.5–14.5)
EST. GFR  (AFRICAN AMERICAN): >60 ML/MIN/1.73 M^2
EST. GFR  (NON AFRICAN AMERICAN): >60 ML/MIN/1.73 M^2
GLUCOSE SERPL-MCNC: 103 MG/DL (ref 70–110)
HCT VFR BLD AUTO: 50.7 % (ref 40–54)
HDLC SERPL-MCNC: 60 MG/DL (ref 40–75)
HDLC SERPL: 30.5 % (ref 20–50)
HGB BLD-MCNC: 16.2 G/DL (ref 14–18)
IMM GRANULOCYTES # BLD AUTO: 0.03 K/UL (ref 0–0.04)
IMM GRANULOCYTES NFR BLD AUTO: 0.5 % (ref 0–0.5)
LDLC SERPL CALC-MCNC: 125.2 MG/DL (ref 63–159)
LYMPHOCYTES # BLD AUTO: 1.7 K/UL (ref 1–4.8)
LYMPHOCYTES NFR BLD: 29.4 % (ref 18–48)
MCH RBC QN AUTO: 31 PG (ref 27–31)
MCHC RBC AUTO-ENTMCNC: 32 G/DL (ref 32–36)
MCV RBC AUTO: 97 FL (ref 82–98)
MONOCYTES # BLD AUTO: 0.6 K/UL (ref 0.3–1)
MONOCYTES NFR BLD: 10.3 % (ref 4–15)
NEUTROPHILS # BLD AUTO: 3.3 K/UL (ref 1.8–7.7)
NEUTROPHILS NFR BLD: 57.5 % (ref 38–73)
NONHDLC SERPL-MCNC: 137 MG/DL
NRBC BLD-RTO: 0 /100 WBC
PLATELET # BLD AUTO: 190 K/UL (ref 150–350)
PMV BLD AUTO: 10.7 FL (ref 9.2–12.9)
POTASSIUM SERPL-SCNC: 4.2 MMOL/L (ref 3.5–5.1)
PROT SERPL-MCNC: 7.1 G/DL (ref 6–8.4)
RBC # BLD AUTO: 5.22 M/UL (ref 4.6–6.2)
SODIUM SERPL-SCNC: 141 MMOL/L (ref 136–145)
TRIGL SERPL-MCNC: 59 MG/DL (ref 30–150)
WBC # BLD AUTO: 5.72 K/UL (ref 3.9–12.7)

## 2020-01-23 PROCEDURE — 85025 COMPLETE CBC W/AUTO DIFF WBC: CPT

## 2020-01-23 PROCEDURE — 99999 PR PBB SHADOW E&M-EST. PATIENT-LVL III: ICD-10-PCS | Mod: PBBFAC,,, | Performed by: INTERNAL MEDICINE

## 2020-01-23 PROCEDURE — 90686 IIV4 VACC NO PRSV 0.5 ML IM: CPT | Mod: S$GLB,,, | Performed by: INTERNAL MEDICINE

## 2020-01-23 PROCEDURE — 99999 PR PBB SHADOW E&M-EST. PATIENT-LVL III: CPT | Mod: PBBFAC,,, | Performed by: INTERNAL MEDICINE

## 2020-01-23 PROCEDURE — 80053 COMPREHEN METABOLIC PANEL: CPT

## 2020-01-23 PROCEDURE — 36415 COLL VENOUS BLD VENIPUNCTURE: CPT

## 2020-01-23 PROCEDURE — 84153 ASSAY OF PSA TOTAL: CPT

## 2020-01-23 PROCEDURE — 90471 IMMUNIZATION ADMIN: CPT | Mod: S$GLB,,, | Performed by: INTERNAL MEDICINE

## 2020-01-23 PROCEDURE — 90686 FLU VACCINE (QUAD) GREATER THAN OR EQUAL TO 3YO PRESERVATIVE FREE IM: ICD-10-PCS | Mod: S$GLB,,, | Performed by: INTERNAL MEDICINE

## 2020-01-23 PROCEDURE — 99396 PR PREVENTIVE VISIT,EST,40-64: ICD-10-PCS | Mod: 25,S$GLB,, | Performed by: INTERNAL MEDICINE

## 2020-01-23 PROCEDURE — 90471 FLU VACCINE (QUAD) GREATER THAN OR EQUAL TO 3YO PRESERVATIVE FREE IM: ICD-10-PCS | Mod: S$GLB,,, | Performed by: INTERNAL MEDICINE

## 2020-01-23 PROCEDURE — 80061 LIPID PANEL: CPT

## 2020-01-23 PROCEDURE — 99396 PREV VISIT EST AGE 40-64: CPT | Mod: 25,S$GLB,, | Performed by: INTERNAL MEDICINE

## 2020-08-21 ENCOUNTER — OFFICE VISIT (OUTPATIENT)
Dept: INTERNAL MEDICINE | Facility: CLINIC | Age: 60
End: 2020-08-21
Payer: COMMERCIAL

## 2020-08-21 VITALS
HEIGHT: 73 IN | OXYGEN SATURATION: 97 % | BODY MASS INDEX: 25.71 KG/M2 | DIASTOLIC BLOOD PRESSURE: 74 MMHG | WEIGHT: 194 LBS | SYSTOLIC BLOOD PRESSURE: 118 MMHG | HEART RATE: 68 BPM

## 2020-08-21 DIAGNOSIS — I80.02 THROMBOPHLEBITIS OF SUPERFICIAL VEINS OF LEFT LOWER EXTREMITY: Primary | ICD-10-CM

## 2020-08-21 PROCEDURE — 99214 OFFICE O/P EST MOD 30 MIN: CPT | Mod: S$GLB,,, | Performed by: INTERNAL MEDICINE

## 2020-08-21 PROCEDURE — 3008F PR BODY MASS INDEX (BMI) DOCUMENTED: ICD-10-PCS | Mod: CPTII,S$GLB,, | Performed by: INTERNAL MEDICINE

## 2020-08-21 PROCEDURE — 99999 PR PBB SHADOW E&M-EST. PATIENT-LVL III: ICD-10-PCS | Mod: PBBFAC,,, | Performed by: INTERNAL MEDICINE

## 2020-08-21 PROCEDURE — 99214 PR OFFICE/OUTPT VISIT, EST, LEVL IV, 30-39 MIN: ICD-10-PCS | Mod: S$GLB,,, | Performed by: INTERNAL MEDICINE

## 2020-08-21 PROCEDURE — 3008F BODY MASS INDEX DOCD: CPT | Mod: CPTII,S$GLB,, | Performed by: INTERNAL MEDICINE

## 2020-08-21 PROCEDURE — 99999 PR PBB SHADOW E&M-EST. PATIENT-LVL III: CPT | Mod: PBBFAC,,, | Performed by: INTERNAL MEDICINE

## 2020-08-21 NOTE — PROGRESS NOTES
62-year-old male    For 3 days this week he started noticing pain along the medial aspect of his left leg which was more in the mid to distal thigh extending across the knee to the upper leg medial.  He can feel bumpy area in a linear distribution.   However today it is much better way only feels all localize area pain only toward the knee  There was no leg swelling, but that could have been some swelling around the medial aspect of the leg where was hurting    He personally states a few years ago he was diagnosed with a DVT in that this may be similar  Also he got back recently to exercising and he thinks he may have pulled a muscle      In review of his chart, he was seen by me in 2017, in which he had a very similar situation and was noted 30 with Hadley Robles and ultrasound did not show DVT but areas are thrombophlebitis    Examination  Weight is 194  Pulse 68  Blood pressure 120/72  Trace knee and ankle jerk reflexes  I can palpate of the little bit of a venous cord along the medial aspect of the thigh distally and he appears to be slightly tender but it is not extensive in the does not involve the upper part of his thigh or calf    Impression   superficial thrombophlebitis, doubt that this is consistent with deep venous thrombosis    Plan  Warm pack over the area and use of Advil Aleve for next couple of days    If he has no improvement I agree with doing an ultrasound  Explain to him that the report that he had 3 years ago was not deep venous thrombosis although the ultrasound was to evaluate for this and he does not recall being on an anticoagulant or blood thinner

## 2020-09-03 ENCOUNTER — PATIENT OUTREACH (OUTPATIENT)
Dept: ADMINISTRATIVE | Facility: OTHER | Age: 60
End: 2020-09-03

## 2020-09-04 ENCOUNTER — OFFICE VISIT (OUTPATIENT)
Dept: OPTOMETRY | Facility: CLINIC | Age: 60
End: 2020-09-04
Payer: COMMERCIAL

## 2020-09-04 DIAGNOSIS — H52.4 PRESBYOPIA: Primary | ICD-10-CM

## 2020-09-04 DIAGNOSIS — Z98.890 HISTORY OF PHOTOREFRACTIVE KERATECTOMY (PRK): ICD-10-CM

## 2020-09-04 DIAGNOSIS — H43.393 VITREOUS SYNERESIS OF BOTH EYES: ICD-10-CM

## 2020-09-04 DIAGNOSIS — I65.22 STENOSIS OF LEFT CAROTID ARTERY: ICD-10-CM

## 2020-09-04 DIAGNOSIS — H25.13 NS (NUCLEAR SCLEROSIS), BILATERAL: ICD-10-CM

## 2020-09-04 PROCEDURE — 99999 PR PBB SHADOW E&M-EST. PATIENT-LVL II: CPT | Mod: PBBFAC,,, | Performed by: OPTOMETRIST

## 2020-09-04 PROCEDURE — 92015 DETERMINE REFRACTIVE STATE: CPT | Mod: S$GLB,,, | Performed by: OPTOMETRIST

## 2020-09-04 PROCEDURE — 92014 PR EYE EXAM, EST PATIENT,COMPREHESV: ICD-10-PCS | Mod: S$GLB,,, | Performed by: OPTOMETRIST

## 2020-09-04 PROCEDURE — 92015 PR REFRACTION: ICD-10-PCS | Mod: S$GLB,,, | Performed by: OPTOMETRIST

## 2020-09-04 PROCEDURE — 99999 PR PBB SHADOW E&M-EST. PATIENT-LVL II: ICD-10-PCS | Mod: PBBFAC,,, | Performed by: OPTOMETRIST

## 2020-09-04 PROCEDURE — 92014 COMPRE OPH EXAM EST PT 1/>: CPT | Mod: S$GLB,,, | Performed by: OPTOMETRIST

## 2020-09-04 NOTE — PROGRESS NOTES
HPI     Pt here for annual visit  Using +2.25  Floaters sometimes  No c/o about va  Patient denies diplopia, headaches, flashes/floaters, pain, and   itching/burning/tearing.    Pt does not use any eye drops.      Last edited by Catarina Campos on 9/4/2020 10:01 AM. (History)            Assessment /Plan     For exam results, see Encounter Report.    Presbyopia  Ok to continue with OTC readers    NS (nuclear sclerosis), bilateral   Mild, monitor    Vitreous syneresis of both eyes   No holes, tears or RD  OD>OS, stable, monitor    Stenosis of left carotid artery   Good internal ocular health today    History of photorefractive keratectomy (PRK)  Dry eye syndrome, bilateral              Use refresh liquigel BID/ PRN     RTC 1 year

## 2020-10-13 ENCOUNTER — PATIENT OUTREACH (OUTPATIENT)
Dept: ADMINISTRATIVE | Facility: OTHER | Age: 60
End: 2020-10-13

## 2020-10-13 ENCOUNTER — OFFICE VISIT (OUTPATIENT)
Dept: OPHTHALMOLOGY | Facility: CLINIC | Age: 60
End: 2020-10-13
Payer: COMMERCIAL

## 2020-10-13 DIAGNOSIS — B30.1: Primary | ICD-10-CM

## 2020-10-13 PROCEDURE — 99999 PR PBB SHADOW E&M-EST. PATIENT-LVL III: CPT | Mod: PBBFAC,,, | Performed by: OPHTHALMOLOGY

## 2020-10-13 PROCEDURE — 92012 PR EYE EXAM, EST PATIENT,INTERMED: ICD-10-PCS | Mod: S$GLB,,, | Performed by: OPHTHALMOLOGY

## 2020-10-13 PROCEDURE — 92012 INTRM OPH EXAM EST PATIENT: CPT | Mod: S$GLB,,, | Performed by: OPHTHALMOLOGY

## 2020-10-13 PROCEDURE — 99999 PR PBB SHADOW E&M-EST. PATIENT-LVL III: ICD-10-PCS | Mod: PBBFAC,,, | Performed by: OPHTHALMOLOGY

## 2020-10-13 NOTE — PROGRESS NOTES
HPI     Triage pt  Patient states OS feeling of pressure and mucus x last pm.  No fb sensation.  Slight eye pain throughout the night.  Slight blurry OS>OD vision.--wear only readers.    I have personally interviewed the patient, reviewed the history and   examined the patient and agree with the technician's exam.      Last edited by John Lai MD on 10/13/2020 10:43 AM. (History)            Assessment /Plan     For exam results, see Encounter Report.    Acute follicular conjunctivitis of left eye due to adenovirus      Emphasized the contagious nature of the illness and the possibility of spread to his right eye.  Cold compresses as desired.  Artificial tears as desired.  Vasocon-A or equivalent up to every four hours as needed for irritation.  Return in 10 days if any persistent symptoms.

## 2020-10-13 NOTE — PATIENT INSTRUCTIONS
Cold compresses as desired.  Artificial tears as desired.  Vasocon-A or equivalent up to every four hours as needed for irritation.  Return in 10 days if any persistent symptoms.

## 2020-11-09 ENCOUNTER — OFFICE VISIT (OUTPATIENT)
Dept: INTERNAL MEDICINE | Facility: CLINIC | Age: 60
End: 2020-11-09
Payer: COMMERCIAL

## 2020-11-09 VITALS
OXYGEN SATURATION: 97 % | HEART RATE: 79 BPM | HEIGHT: 73 IN | WEIGHT: 189.63 LBS | DIASTOLIC BLOOD PRESSURE: 76 MMHG | SYSTOLIC BLOOD PRESSURE: 118 MMHG | BODY MASS INDEX: 25.13 KG/M2

## 2020-11-09 DIAGNOSIS — G47.00 INSOMNIA, UNSPECIFIED TYPE: ICD-10-CM

## 2020-11-09 DIAGNOSIS — M77.02 EPICONDYLITIS ELBOW, MEDIAL, LEFT: Primary | ICD-10-CM

## 2020-11-09 DIAGNOSIS — I49.3 PVC (PREMATURE VENTRICULAR CONTRACTION): ICD-10-CM

## 2020-11-09 PROCEDURE — 93005 EKG 12-LEAD: ICD-10-PCS | Mod: S$GLB,,, | Performed by: INTERNAL MEDICINE

## 2020-11-09 PROCEDURE — 3008F BODY MASS INDEX DOCD: CPT | Mod: CPTII,S$GLB,, | Performed by: INTERNAL MEDICINE

## 2020-11-09 PROCEDURE — 93010 ELECTROCARDIOGRAM REPORT: CPT | Mod: S$GLB,,, | Performed by: INTERNAL MEDICINE

## 2020-11-09 PROCEDURE — 3008F PR BODY MASS INDEX (BMI) DOCUMENTED: ICD-10-PCS | Mod: CPTII,S$GLB,, | Performed by: INTERNAL MEDICINE

## 2020-11-09 PROCEDURE — 99214 OFFICE O/P EST MOD 30 MIN: CPT | Mod: S$GLB,,, | Performed by: INTERNAL MEDICINE

## 2020-11-09 PROCEDURE — 93005 ELECTROCARDIOGRAM TRACING: CPT | Mod: S$GLB,,, | Performed by: INTERNAL MEDICINE

## 2020-11-09 PROCEDURE — 93010 EKG 12-LEAD: ICD-10-PCS | Mod: S$GLB,,, | Performed by: INTERNAL MEDICINE

## 2020-11-09 PROCEDURE — 99999 PR PBB SHADOW E&M-EST. PATIENT-LVL III: ICD-10-PCS | Mod: PBBFAC,,, | Performed by: INTERNAL MEDICINE

## 2020-11-09 PROCEDURE — 99999 PR PBB SHADOW E&M-EST. PATIENT-LVL III: CPT | Mod: PBBFAC,,, | Performed by: INTERNAL MEDICINE

## 2020-11-09 PROCEDURE — 99214 PR OFFICE/OUTPT VISIT, EST, LEVL IV, 30-39 MIN: ICD-10-PCS | Mod: S$GLB,,, | Performed by: INTERNAL MEDICINE

## 2020-11-09 RX ORDER — ZOLPIDEM TARTRATE 5 MG/1
5 TABLET ORAL NIGHTLY PRN
Qty: 30 TABLET | Refills: 1 | Status: SHIPPED | OUTPATIENT
Start: 2020-11-09 | End: 2023-06-02

## 2020-11-09 RX ORDER — DICLOFENAC SODIUM 10 MG/G
2 GEL TOPICAL 4 TIMES DAILY
Qty: 100 G | Refills: 1 | Status: SHIPPED | OUTPATIENT
Start: 2020-11-09 | End: 2022-04-05

## 2020-11-09 NOTE — PROGRESS NOTES
MEDICAL HISTORY:  BPH, status post rezum therapy  Nonsustained VT on stress test, left catheterization  revealing nonobstructive plaque  Allergic rhinitis,allergy injections.  Right inguinal hernia repair.  Eye surgery.     SOCIAL HISTORY:  Tobacco use is limited and sporadic.  Exercise, goes to the gym   twice a week.  alcohol use, none.        60-year-old male      He has been noticing pain involving his left elbow, near the medial epicondyle, particularly when picking up things.  In the past he has had this before and how it was managed was slow down or stop weightlifting.      He his in min bouts of insomnia which in the past he has used the medication Ambien which was effective.  It can be a situation in which she has trouble going to sleep or staying asleep.  In the past the use of Ambien has been intermittent    .  He would like to have electrocardiogram, last year he had a normal left heart catheterization but this was done due to a positive stress test.  He was also noted to have about a nonsustained V-tach when his heart rate got above a certain level and when he exercises he contact keeps heart rate below 140.  Presently reports no chest pain palpitation shortness of breath    Medications  Multivitamin  Omega 3 fatty acids      Examination  Weight is 189  Pulse 64  Blood pressure 112/64  Neck no thyromegaly no masses  Chest clear breath sounds  Heart regular rate and rhythm  Abdominal exam is bowel sounds soft nontender no hepatosplenomegaly abdominal masses  2+ carotid pulses no bruits  Patient is tender over the muscular region near the epicondyle, medial condyle left arm, some pain with pronation    ECG revealed normal sinus rhythm with occasional PVC ,evidence for incomplete right heartburn branch block    Impression    Medial epicondylitis  Insomnia  PVCs    Plan  Elbow brace, ice pack, diclofenac gel  Ambien 5 mg at bedtime  ECG comparison compared to before but continue with attention a proper diet  physical activity

## 2020-11-12 ENCOUNTER — PATIENT MESSAGE (OUTPATIENT)
Dept: INTERNAL MEDICINE | Facility: CLINIC | Age: 60
End: 2020-11-12

## 2020-11-12 DIAGNOSIS — I49.3 PVC'S (PREMATURE VENTRICULAR CONTRACTIONS): Primary | ICD-10-CM

## 2020-11-17 ENCOUNTER — PATIENT OUTREACH (OUTPATIENT)
Dept: ADMINISTRATIVE | Facility: OTHER | Age: 60
End: 2020-11-17

## 2020-11-17 ENCOUNTER — OFFICE VISIT (OUTPATIENT)
Dept: CARDIOLOGY | Facility: CLINIC | Age: 60
End: 2020-11-17
Payer: COMMERCIAL

## 2020-11-17 VITALS
DIASTOLIC BLOOD PRESSURE: 80 MMHG | HEIGHT: 73 IN | BODY MASS INDEX: 25.29 KG/M2 | OXYGEN SATURATION: 98 % | SYSTOLIC BLOOD PRESSURE: 132 MMHG | HEART RATE: 55 BPM | WEIGHT: 190.81 LBS

## 2020-11-17 DIAGNOSIS — I25.10 CORONARY ARTERY DISEASE DUE TO LIPID RICH PLAQUE: ICD-10-CM

## 2020-11-17 DIAGNOSIS — I34.0 NONRHEUMATIC MITRAL VALVE REGURGITATION: ICD-10-CM

## 2020-11-17 DIAGNOSIS — I38 VALVULAR HEART DISEASE: ICD-10-CM

## 2020-11-17 DIAGNOSIS — Q24.5 CORONARY-MYOCARDIAL BRIDGE: ICD-10-CM

## 2020-11-17 DIAGNOSIS — I25.83 CORONARY ARTERY DISEASE DUE TO LIPID RICH PLAQUE: ICD-10-CM

## 2020-11-17 DIAGNOSIS — I49.3 PREMATURE VENTRICULAR CONTRACTIONS (PVCS) (VPCS): ICD-10-CM

## 2020-11-17 DIAGNOSIS — I36.1 NONRHEUMATIC TRICUSPID VALVE REGURGITATION: ICD-10-CM

## 2020-11-17 DIAGNOSIS — I47.29 NSVT (NONSUSTAINED VENTRICULAR TACHYCARDIA): Primary | ICD-10-CM

## 2020-11-17 PROCEDURE — 3008F PR BODY MASS INDEX (BMI) DOCUMENTED: ICD-10-PCS | Mod: CPTII,S$GLB,, | Performed by: INTERNAL MEDICINE

## 2020-11-17 PROCEDURE — 1126F PR PAIN SEVERITY QUANTIFIED, NO PAIN PRESENT: ICD-10-PCS | Mod: S$GLB,,, | Performed by: INTERNAL MEDICINE

## 2020-11-17 PROCEDURE — 93000 ELECTROCARDIOGRAM COMPLETE: CPT | Mod: S$GLB,,, | Performed by: INTERNAL MEDICINE

## 2020-11-17 PROCEDURE — 3008F BODY MASS INDEX DOCD: CPT | Mod: CPTII,S$GLB,, | Performed by: INTERNAL MEDICINE

## 2020-11-17 PROCEDURE — 99999 PR PBB SHADOW E&M-EST. PATIENT-LVL III: ICD-10-PCS | Mod: PBBFAC,,, | Performed by: INTERNAL MEDICINE

## 2020-11-17 PROCEDURE — 99243 PR OFFICE CONSULTATION,LEVEL III: ICD-10-PCS | Mod: S$GLB,,, | Performed by: INTERNAL MEDICINE

## 2020-11-17 PROCEDURE — 99999 PR PBB SHADOW E&M-EST. PATIENT-LVL III: CPT | Mod: PBBFAC,,, | Performed by: INTERNAL MEDICINE

## 2020-11-17 PROCEDURE — 1126F AMNT PAIN NOTED NONE PRSNT: CPT | Mod: S$GLB,,, | Performed by: INTERNAL MEDICINE

## 2020-11-17 PROCEDURE — 99243 OFF/OP CNSLTJ NEW/EST LOW 30: CPT | Mod: S$GLB,,, | Performed by: INTERNAL MEDICINE

## 2020-11-17 PROCEDURE — 93000 EKG 12-LEAD: ICD-10-PCS | Mod: S$GLB,,, | Performed by: INTERNAL MEDICINE

## 2020-11-17 NOTE — LETTER
November 17, 2020      Saturnino Hernandez MD  [address]  [facility] [address]           [facility] - Cardiology  [address]   [facility] [address]  Phone: [phone]          Patient: Jovi Pacheco   MR Number: [id number]   YOB: 1960   Date of Visit: 11/17/2020       Dear Dr. Saturnino Hernandez:    Thank you for referring Jovi Pacheco to me for evaluation. Attached you will find relevant portions of my assessment and plan of care.    If you have questions, please do not hesitate to call me. I look forward to following Jovi Pacheco along with you.    Sincerely,    Jose Maria Lloyd MD    Enclosure  CC:  No Recipients    If you would like to receive this communication electronically, please contact [email or web address] or [phone] to request more information on [employer] access.    For providers and/or their staff who would like to refer a patient to [facility], please contact us through our one-stop-shop provider referral line, [facility] , at [phone].    If you feel you have received this communication in error or would no longer like to receive these types of communications, please e-mail [email or web address]

## 2020-11-17 NOTE — PROGRESS NOTES
Health Maintenance Due   Topic Date Due    HIV Screening  01/28/1975    TETANUS VACCINE  01/28/1976    High Dose Statin  01/28/1979    Shingles Vaccine (1 of 2) 01/28/2008     Updates were requested from care everywhere.  Chart was reviewed for overdue Proactive [facility] Encounters (YURIY) topics (CRS, Breast Cancer Screening, Eye exam)  Health Maintenance has been updated.  LINKS immunization registry triggered.  Immunizations were reconciled.

## 2020-11-17 NOTE — PROGRESS NOTES
CARDIOLOGY CONSULTATION    REASON FOR CONSULT:   Jovi Pacheco is a 60 y.o. male who presents for cardiovascular evaluation.      HISTORY OF PRESENT ILLNESS:     Jovi Pacheco presents for cardiovascular evaluation.  History of NSVT.  He had an echocardiogram in 2019 that showed an ejection fraction of 50%.  Exercise stress echo showed no ischemia but he had several episodes of PM VT upwards of 6 beats.  Left heart catheterization in February of 2019 showed nonobstructive coronary artery disease.  He denies any palpitations.  Feels good.  At one point he was prescribed metoprolol but due to lack of symptoms did not start.  He is physically active.  .  Denies any limitations.  EKG today shows sinus rhythm with PVCs.    CARDIOVASCULAR HISTORY:     NSVT  Low normal LVEF    PAST MEDICAL HISTORY:     Past Medical History:   Diagnosis Date    Allergic rhinitis 12/17/2018    Family history of atherosclerosis     History of colon polyps 1/24/2019    Keloid cicatrix     S/P right inguinal hernia repair with mesh 9/1/2017       PAST SURGICAL HISTORY:     Past Surgical History:   Procedure Laterality Date    COLONOSCOPY N/A 1/24/2019    Procedure: COLONOSCOPY;  Surgeon: Mekhi Montez MD;  Location: [facility] ENDO ([address] [place] [address]);  Service: Endoscopy;  Laterality: N/A;    CORONARY ANGIOGRAPHY Bilateral 2/14/2019    Procedure: ANGIOGRAM, CORONARY ARTERY;  Surgeon: Hayden Barbosa MD;  Location: [facility] CATH LAB;  Service: Cardiology;  Laterality: Bilateral;    EYE SURGERY      HERNIA REPAIR Right     inguinal    LEFT HEART CATHETERIZATION Left 2/14/2019    Procedure: Left heart cath;  Surgeon: Hayden Barbosa MD;  Location: [facility] CATH LAB;  Service: Cardiology;  Laterality: Left;       ALLERGIES AND MEDICATION:     Review of patient's allergies indicates:   Allergen Reactions    Cat/feline products     Dog dander Other (See Comments)    Peanut     Egg derived Other (See Comments)     Runny nose         Medication List          Accurate as of November 17, 2020 10:47 AM. If you have any questions, ask your nurse or doctor.            CONTINUE taking these medications    aspirin 81 MG EC tablet  Commonly known as: ECOTRIN     co-enzyme Q-10 30 mg capsule     diclofenac sodium 1 % Gel  Commonly known as: VOLTAREN  Apply 2 g topically 4 (four) times daily.     fish oil-omega-3 fatty acids 300-1,000 mg capsule     ONE DAILY MULTIVITAMIN per tablet  Generic drug: multivitamin     zolpidem 5 MG Tab  Commonly known as: AMBIEN  Take 1 tablet (5 mg total) by mouth nightly as needed.            SOCIAL HISTORY:     Social History     Socioeconomic History    Marital status:      Spouse name: Not on file    Number of children: Not on file    Years of education: Not on file    Highest education level: Not on file   Occupational History    Not on file   Social Needs    Financial resource strain: Not on file    Food insecurity     Worry: Not on file     Inability: Not on file    Transportation needs     Medical: Not on file     Non-medical: Not on file   Tobacco Use    Smoking status: Never Smoker    Smokeless tobacco: Never Used   Substance and Sexual Activity    Alcohol use: Yes     Alcohol/week: 2.0 standard drinks     Types: 1 Glasses of wine, 1 Cans of beer per week     Comment: limited/sporadic    Drug use: No    Sexual activity: Not on file   Lifestyle    Physical activity     Days per week: Not on file     Minutes per session: Not on file    Stress: Not on file   Relationships    Social connections     Talks on phone: Not on file     Gets together: Not on file     Attends Sabianist service: Not on file     Active member of club or organization: Not on file     Attends meetings of clubs or organizations: Not on file     Relationship status: Not on file   Other Topics Concern    Not on file   Social History Narrative    Not on file       FAMILY HISTORY:     Family History   Problem Relation Age of  "Onset    Heart disease Mother     Stroke Mother     Cancer Father     No Known Problems Brother     Melanoma Neg Hx        REVIEW OF SYSTEMS:   Review of Systems   Respiratory: Negative for sputum production, shortness of breath and wheezing.    Cardiovascular: Negative for chest pain, palpitations, orthopnea, claudication, leg swelling and PND.   Gastrointestinal: Negative for abdominal pain, heartburn, nausea and vomiting.   Neurological: Negative for dizziness, tremors, sensory change, speech change, focal weakness, seizures, loss of consciousness, weakness and headaches.       PHYSICAL EXAM:     Vitals:    11/17/20 0949   BP: 132/80   Pulse: (!) 55    Body mass index is 25.17 kg/m².  Weight: 86.5 kg (190 lb 12.9 oz)   Height: 6' 1" (185.4 cm)     Physical Exam  Vitals signs reviewed.   Constitutional:       General: He is not in acute distress.     Appearance: He is well-developed. He is not diaphoretic.   Neck:      Vascular: No carotid bruit or JVD.   Cardiovascular:      Rate and Rhythm: Normal rate and regular rhythm.      Pulses: Normal pulses.      Heart sounds: Murmur present. Systolic murmur present with a grade of 2/6.   Pulmonary:      Effort: Pulmonary effort is normal.      Breath sounds: Normal breath sounds.   Abdominal:      General: Bowel sounds are normal.      Palpations: Abdomen is soft.      Tenderness: There is no abdominal tenderness.   Musculoskeletal:      Right lower leg: No edema.      Left lower leg: No edema.   Neurological:      Mental Status: He is alert and oriented to person, place, and time.   Psychiatric:         Speech: Speech normal.         Behavior: Behavior normal.         DATA:   EKG: (personally reviewed tracing)  11/17/2020-sinus rhythm PVCs  Laboratory:  CBC:  Recent Labs   Lab 02/14/19  1029 07/29/19  1444 01/23/20  0836   WBC 5.51 7.01 5.72   Hemoglobin 15.8 16.2 16.2   Hematocrit 48.1 47.9 50.7   Platelets 208 223 190       CHEMISTRIES:  Recent Labs   Lab " 02/14/19  1029 07/29/19  1444 01/23/20  0836   Glucose 95 123 H 103   Sodium 138 137 141   Potassium 4.2 4.1 4.2   BUN 16 21 20   Creatinine 0.8 0.9 0.9   eGFR if African American >60.0 >60.0 >60.0   eGFR if non African American >60.0 >60.0 >60.0   Calcium 9.1 9.4 9.6       CARDIAC BIOMARKERS:        COAGS:  Recent Labs   Lab 02/14/19  1029   INR 1.1       LIPIDS/LFTS:  Recent Labs   Lab 12/17/18  1402 01/25/19  0811 01/23/20  0836   Cholesterol 204 H  --  197   Triglycerides 56  --  59   HDL 57  --  60   LDL Cholesterol 135.8  --  125.2   Non-HDL Cholesterol 147  --  137   AST 32 23 26   ALT 46 H 31 38       Cardiovascular Testing:    Left heart catheterization 02/14/2019:    · LV end diastolic pressure is normal.  · Estimated blood loss: none  · Non-obstructive CAD.  · Myocardial bridge.    PET stress test 01/31/2019:    · There is a small sized, mild intensity inferobasilar stress induced perfusion abnormality involving 5% of the LV myocardium PLB territory.  · Within the perfusion abnormality absolute myocardial perfusion (cc/min/gm) averaged 0.77 at rest, 1.00 at stress and CFR was 1.31, which equates to moderately reduced coronary flow capacity in 5% of the myocardium (within the PLB territory).  · There is also a small sized, mild intensity lateral apical resting perfusion abnormality involving 5% of the LV myocardium consistent with non-transmural infarct.  · Whole heart absolute myocardial perfusion (cc/min/g) averaged 0.76 at rest (which is normal), 1.5 at stress (which is mildly reduced), and 1.98 CFR (which is mildly reduced).  · Stress flow is reduced diffusely throughout the heart consistent with the presence of CAD, but above ischemic thresholds.  · Gated perfusion images showed an ejection fraction of 46 % at rest and 56 % during stress. Normal is >51%.  · LV cavity size is normal at rest and normal at stress.  · There is mild global hypokinesis at rest and normal wall motion at stress.  · The EKG  portion of this study is negative for myocardial ischemia.  · There was no ST segment deviation noted during stress.  · The patient reported no symptoms during the stress test.  · Arrhythmias during stress: occasional PVCs.      Echocardiogram 12/21/2018:    · Low normal left ventricular systolic function. The estimated ejection fraction is 50%  · Normal LV diastolic function.  · Mild-moderate left atrial enlargement.  · Mild mitral sclerosis.  · Mild to moderate tricuspid regurgitation.  · Mild mitral regurgitation.  · Mild right ventricular enlargement.  · Normal right ventricular systolic function.    ASSESSMENT:     1.  NSVT  2.  Low normal left ventricular systolic function  3.  Left atrial enlargement  4.  Valvular heart disease:  Mild-to-moderate tricuspid regurgitation, mild mitral regurgitation  5.  Myocardial bridge  6.  Nonobstructive coronary artery disease    PLAN:     1.  NSVT/PVCs:  24 hr Holter to assess PVC burden  2.  Low normal left ventricular systolic function:  At some point follow-up echocardiogram  3.  Return to clinic 1 month    Jose Maria Lloyd MD, MPH, FACC, [facility]

## 2020-12-01 ENCOUNTER — HOSPITAL ENCOUNTER (OUTPATIENT)
Dept: CARDIOLOGY | Facility: HOSPITAL | Age: 60
Discharge: HOME | End: 2020-12-01
Attending: INTERNAL MEDICINE
Payer: COMMERCIAL

## 2020-12-01 DIAGNOSIS — I47.29 NSVT (NONSUSTAINED VENTRICULAR TACHYCARDIA): ICD-10-CM

## 2020-12-01 PROCEDURE — 93227 XTRNL ECG REC<48 HR R&I: CPT | Mod: ,,, | Performed by: INTERNAL MEDICINE

## 2020-12-01 PROCEDURE — 93225 XTRNL ECG REC<48 HRS REC: CPT

## 2020-12-01 PROCEDURE — 93227 HOLTER MONITOR - 24 HOUR (CUPID ONLY): ICD-10-PCS | Mod: ,,, | Performed by: INTERNAL MEDICINE

## 2020-12-03 LAB
OHS CV EVENT MONITOR DAY: 0
OHS CV HOLTER LENGTH DECIMAL HOURS: 23.98
OHS CV HOLTER LENGTH HOURS: 23
OHS CV HOLTER LENGTH MINUTES: 59

## 2020-12-04 NOTE — PROGRESS NOTES
The Holter monitor reveals frequent premature ventricular contractions    Recommend to follow-up which a cardiology visit on December 17th    Contact me if there is any questions or concerns

## 2020-12-17 ENCOUNTER — OFFICE VISIT (OUTPATIENT)
Dept: CARDIOLOGY | Facility: CLINIC | Age: 60
End: 2020-12-17
Payer: COMMERCIAL

## 2020-12-17 VITALS
SYSTOLIC BLOOD PRESSURE: 122 MMHG | HEART RATE: 70 BPM | RESPIRATION RATE: 16 BRPM | DIASTOLIC BLOOD PRESSURE: 80 MMHG | WEIGHT: 193.31 LBS | HEIGHT: 73 IN | BODY MASS INDEX: 25.62 KG/M2 | OXYGEN SATURATION: 98 %

## 2020-12-17 DIAGNOSIS — I25.83 CORONARY ARTERY DISEASE DUE TO LIPID RICH PLAQUE: ICD-10-CM

## 2020-12-17 DIAGNOSIS — I25.10 CORONARY ARTERY DISEASE DUE TO LIPID RICH PLAQUE: ICD-10-CM

## 2020-12-17 DIAGNOSIS — I49.3 PREMATURE VENTRICULAR CONTRACTIONS (PVCS) (VPCS): ICD-10-CM

## 2020-12-17 DIAGNOSIS — I36.1 NONRHEUMATIC TRICUSPID VALVE REGURGITATION: ICD-10-CM

## 2020-12-17 DIAGNOSIS — I34.0 NONRHEUMATIC MITRAL VALVE REGURGITATION: ICD-10-CM

## 2020-12-17 DIAGNOSIS — Q24.5 CORONARY-MYOCARDIAL BRIDGE: ICD-10-CM

## 2020-12-17 DIAGNOSIS — I47.29 NSVT (NONSUSTAINED VENTRICULAR TACHYCARDIA): Primary | ICD-10-CM

## 2020-12-17 DIAGNOSIS — I47.29 POLYMORPHIC VENTRICULAR TACHYCARDIA: ICD-10-CM

## 2020-12-17 PROCEDURE — 3008F BODY MASS INDEX DOCD: CPT | Mod: CPTII,S$GLB,, | Performed by: INTERNAL MEDICINE

## 2020-12-17 PROCEDURE — 99214 OFFICE O/P EST MOD 30 MIN: CPT | Mod: S$GLB,,, | Performed by: INTERNAL MEDICINE

## 2020-12-17 PROCEDURE — 99214 PR OFFICE/OUTPT VISIT, EST, LEVL IV, 30-39 MIN: ICD-10-PCS | Mod: S$GLB,,, | Performed by: INTERNAL MEDICINE

## 2020-12-17 PROCEDURE — 1126F PR PAIN SEVERITY QUANTIFIED, NO PAIN PRESENT: ICD-10-PCS | Mod: S$GLB,,, | Performed by: INTERNAL MEDICINE

## 2020-12-17 PROCEDURE — 99999 PR PBB SHADOW E&M-EST. PATIENT-LVL III: ICD-10-PCS | Mod: PBBFAC,,, | Performed by: INTERNAL MEDICINE

## 2020-12-17 PROCEDURE — 99999 PR PBB SHADOW E&M-EST. PATIENT-LVL III: CPT | Mod: PBBFAC,,, | Performed by: INTERNAL MEDICINE

## 2020-12-17 PROCEDURE — 3008F PR BODY MASS INDEX (BMI) DOCUMENTED: ICD-10-PCS | Mod: CPTII,S$GLB,, | Performed by: INTERNAL MEDICINE

## 2020-12-17 PROCEDURE — 1126F AMNT PAIN NOTED NONE PRSNT: CPT | Mod: S$GLB,,, | Performed by: INTERNAL MEDICINE

## 2020-12-17 NOTE — PROGRESS NOTES
CARDIOLOGY CLINIC VISIT        HISTORY OF PRESENT ILLNESS:     Jovi Pacheco presents for continued care.  Seen 11/17/2020.   History of NSVT.  Holter from 12/01/2020 showed sinus rhythm with an average heart rate of 78 beats per minute.  Frequent PVCs.  Couplets as well as triplets.  Rare PACs.  No VT noted.  Short runs of aberrantcy.   He had an echocardiogram in 2019 that showed an ejection fraction of 50%.  Exercise stress echo showed no ischemia but he had several episodes of PM VT upwards of 6 beats.  Left heart catheterization in February of 2019 showed nonobstructive coronary artery disease.  He denies any palpitations.  Feels good.  At one point he was prescribed metoprolol but due to lack of symptoms did not start.  He is physically active.  .  Denies any limitations.  He exercises regularly without limitation.  He states he has cut down on his coffee/caffeine.    CARDIOVASCULAR HISTORY:     NSVT  Low normal LVEF    PAST MEDICAL HISTORY:     Past Medical History:   Diagnosis Date    Allergic rhinitis 12/17/2018    Family history of atherosclerosis     History of colon polyps 1/24/2019    Keloid cicatrix     S/P right inguinal hernia repair with mesh 9/1/2017       PAST SURGICAL HISTORY:     Past Surgical History:   Procedure Laterality Date    COLONOSCOPY N/A 1/24/2019    Procedure: COLONOSCOPY;  Surgeon: Mekhi Montez MD;  Location: [facility] ENDO ([address] [place] [address]);  Service: Endoscopy;  Laterality: N/A;    CORONARY ANGIOGRAPHY Bilateral 2/14/2019    Procedure: ANGIOGRAM, CORONARY ARTERY;  Surgeon: Hayden Barbosa MD;  Location: [facility] CATH LAB;  Service: Cardiology;  Laterality: Bilateral;    EYE SURGERY      HERNIA REPAIR Right     inguinal    LEFT HEART CATHETERIZATION Left 2/14/2019    Procedure: Left heart cath;  Surgeon: Hayden Barbosa MD;  Location: [facility] CATH LAB;  Service: Cardiology;  Laterality: Left;       ALLERGIES AND MEDICATION:     Review of patient's allergies indicates:    Allergen Reactions    Cat/feline products     Dog dander Other (See Comments)    Peanut     Egg derived Other (See Comments)     Runny nose        Medication List          Accurate as of December 17, 2020 10:36 AM. If you have any questions, ask your nurse or doctor.            CONTINUE taking these medications    aspirin 81 MG EC tablet  Commonly known as: ECOTRIN     co-enzyme Q-10 30 mg capsule     diclofenac sodium 1 % Gel  Commonly known as: VOLTAREN  Apply 2 g topically 4 (four) times daily.     fish oil-omega-3 fatty acids 300-1,000 mg capsule     ONE DAILY MULTIVITAMIN per tablet  Generic drug: multivitamin     zolpidem 5 MG Tab  Commonly known as: AMBIEN  Take 1 tablet (5 mg total) by mouth nightly as needed.            SOCIAL HISTORY:     Social History     Socioeconomic History    Marital status:      Spouse name: Not on file    Number of children: Not on file    Years of education: Not on file    Highest education level: Not on file   Occupational History    Not on file   Social Needs    Financial resource strain: Not on file    Food insecurity     Worry: Not on file     Inability: Not on file    Transportation needs     Medical: Not on file     Non-medical: Not on file   Tobacco Use    Smoking status: Never Smoker    Smokeless tobacco: Never Used   Substance and Sexual Activity    Alcohol use: Yes     Alcohol/week: 2.0 standard drinks     Types: 1 Glasses of wine, 1 Cans of beer per week     Comment: limited/sporadic    Drug use: No    Sexual activity: Not on file   Lifestyle    Physical activity     Days per week: Not on file     Minutes per session: Not on file    Stress: Not on file   Relationships    Social connections     Talks on phone: Not on file     Gets together: Not on file     Attends Tenriism service: Not on file     Active member of club or organization: Not on file     Attends meetings of clubs or organizations: Not on file     Relationship status: Not on  "file   Other Topics Concern    Not on file   Social History Narrative    Not on file       FAMILY HISTORY:     Family History   Problem Relation Age of Onset    Heart disease Mother     Stroke Mother     Cancer Father     No Known Problems Brother     Melanoma Neg Hx        REVIEW OF SYSTEMS:   Review of Systems   Respiratory: Negative for sputum production, shortness of breath and wheezing.    Cardiovascular: Negative for chest pain, palpitations, orthopnea, claudication, leg swelling and PND.   Gastrointestinal: Negative for abdominal pain, heartburn, nausea and vomiting.   Neurological: Negative for dizziness, tremors, sensory change, speech change, focal weakness, seizures, loss of consciousness, weakness and headaches.       PHYSICAL EXAM:     Vitals:    12/17/20 1016   BP: 122/80   Pulse: 70   Resp: 16    Body mass index is 25.51 kg/m².  Weight: 87.7 kg (193 lb 5.5 oz)   Height: 6' 1" (185.4 cm)     Physical Exam  Vitals signs reviewed.   Constitutional:       General: He is not in acute distress.     Appearance: He is well-developed. He is not diaphoretic.   Neck:      Vascular: No carotid bruit or JVD.   Cardiovascular:      Rate and Rhythm: Normal rate and regular rhythm.      Pulses: Normal pulses.      Heart sounds: Murmur present. Systolic murmur present with a grade of 2/6.   Pulmonary:      Effort: Pulmonary effort is normal.      Breath sounds: Normal breath sounds.   Abdominal:      General: Bowel sounds are normal.      Palpations: Abdomen is soft.      Tenderness: There is no abdominal tenderness.   Musculoskeletal:      Right lower leg: No edema.      Left lower leg: No edema.   Neurological:      Mental Status: He is alert and oriented to person, place, and time.   Psychiatric:         Speech: Speech normal.         Behavior: Behavior normal.         DATA:     Laboratory:  CBC:  Recent Labs   Lab 02/14/19  1029 07/29/19  1444 01/23/20  0836   WBC 5.51 7.01 5.72   Hemoglobin 15.8 16.2 16.2 "   Hematocrit 48.1 47.9 50.7   Platelets 208 223 190       CHEMISTRIES:  Recent Labs   Lab 02/14/19  1029 07/29/19  1444 01/23/20  0836   Glucose 95 123 H 103   Sodium 138 137 141   Potassium 4.2 4.1 4.2   BUN 16 21 20   Creatinine 0.8 0.9 0.9   eGFR if African American >60.0 >60.0 >60.0   eGFR if non African American >60.0 >60.0 >60.0   Calcium 9.1 9.4 9.6       CARDIAC BIOMARKERS:        COAGS:  Recent Labs   Lab 02/14/19  1029   INR 1.1       LIPIDS/LFTS:  Recent Labs   Lab 12/17/18  1402 01/25/19  0811 01/23/20  0836   Cholesterol 204 H  --  197   Triglycerides 56  --  59   HDL 57  --  60   LDL Cholesterol 135.8  --  125.2   Non-HDL Cholesterol 147  --  137   AST 32 23 26   ALT 46 H 31 38       Cardiovascular Testing:    Left heart catheterization 02/14/2019:     · LV end diastolic pressure is normal.  · Estimated blood loss: none  · Non-obstructive CAD.  · Myocardial bridge.     PET stress test 01/31/2019:     · There is a small sized, mild intensity inferobasilar stress induced perfusion abnormality involving 5% of the LV myocardium PLB territory.  · Within the perfusion abnormality absolute myocardial perfusion (cc/min/gm) averaged 0.77 at rest, 1.00 at stress and CFR was 1.31, which equates to moderately reduced coronary flow capacity in 5% of the myocardium (within the PLB territory).  · There is also a small sized, mild intensity lateral apical resting perfusion abnormality involving 5% of the LV myocardium consistent with non-transmural infarct.  · Whole heart absolute myocardial perfusion (cc/min/g) averaged 0.76 at rest (which is normal), 1.5 at stress (which is mildly reduced), and 1.98 CFR (which is mildly reduced).  · Stress flow is reduced diffusely throughout the heart consistent with the presence of CAD, but above ischemic thresholds.  · Gated perfusion images showed an ejection fraction of 46 % at rest and 56 % during stress. Normal is >51%.  · LV cavity size is normal at rest and normal at  stress.  · There is mild global hypokinesis at rest and normal wall motion at stress.  · The EKG portion of this study is negative for myocardial ischemia.  · There was no ST segment deviation noted during stress.  · The patient reported no symptoms during the stress test.  · Arrhythmias during stress: occasional PVCs.        Echocardiogram 12/21/2018:     · Low normal left ventricular systolic function. The estimated ejection fraction is 50%  · Normal LV diastolic function.  · Mild-moderate left atrial enlargement.  · Mild mitral sclerosis.  · Mild to moderate tricuspid regurgitation.  · Mild mitral regurgitation.  · Mild right ventricular enlargement.  · Normal right ventricular systolic function.    ASSESSMENT:     1.  NSVT  2.  Low normal left ventricular systolic function  3.  Left atrial enlargement  4.  Valvular heart disease:  Mild-to-moderate tricuspid regurgitation, mild mitral regurgitation  5.  Myocardial bridge  6.  Nonobstructive coronary artery disease    PLAN:     1.  NSVT:  No sustained episodes on monitor.  2.  Low normal left ventricular systolic function:  Follow-up echocardiogram  3.  Return to clinic 3-4 months.    Jose Maria Lloyd MD, MPH, FACC, [facility]

## 2021-01-11 ENCOUNTER — HOSPITAL ENCOUNTER (OUTPATIENT)
Dept: CARDIOLOGY | Facility: HOSPITAL | Age: 61
Discharge: HOME | End: 2021-01-11
Attending: INTERNAL MEDICINE
Payer: COMMERCIAL

## 2021-01-11 DIAGNOSIS — I47.29 POLYMORPHIC VENTRICULAR TACHYCARDIA: ICD-10-CM

## 2021-01-11 DIAGNOSIS — I49.3 PREMATURE VENTRICULAR CONTRACTIONS (PVCS) (VPCS): ICD-10-CM

## 2021-01-11 DIAGNOSIS — I47.29 NSVT (NONSUSTAINED VENTRICULAR TACHYCARDIA): ICD-10-CM

## 2021-01-11 LAB
ASCENDING AORTA: 3.03 CM
AV INDEX (PROSTH): 0.81
AV MEAN GRADIENT: 2 MMHG
AV PEAK GRADIENT: 3 MMHG
AV VALVE AREA: 3.71 CM2
AV VELOCITY RATIO: 0.84
CV ECHO LV RWT: 0.41 CM
DOP CALC AO PEAK VEL: 0.92 M/S
DOP CALC AO VTI: 18.47 CM
DOP CALC LVOT AREA: 4.6 CM2
DOP CALC LVOT DIAMETER: 2.41 CM
DOP CALC LVOT PEAK VEL: 0.77 M/S
DOP CALC LVOT STROKE VOLUME: 68.57 CM3
DOP CALCLVOT PEAK VEL VTI: 15.04 CM
E WAVE DECELERATION TIME: 261.66 MSEC
E/A RATIO: 0.98
E/E' RATIO: 5.37 M/S
ECHO LV POSTERIOR WALL: 1.05 CM (ref 0.6–1.1)
FRACTIONAL SHORTENING: 24 % (ref 28–44)
INTERVENTRICULAR SEPTUM: 1.43 CM (ref 0.6–1.1)
IVRT: 96.89 MSEC
LA MAJOR: 4.5 CM
LA MINOR: 5.52 CM
LA WIDTH: 4.85 CM
LEFT ATRIUM SIZE: 3.98 CM
LEFT ATRIUM VOLUME: 81.35 CM3
LEFT INTERNAL DIMENSION IN SYSTOLE: 3.88 CM (ref 2.1–4)
LEFT VENTRICLE DIASTOLIC VOLUME: 123.22 ML
LEFT VENTRICLE SYSTOLIC VOLUME: 65.15 ML
LEFT VENTRICULAR INTERNAL DIMENSION IN DIASTOLE: 5.09 CM (ref 3.5–6)
LEFT VENTRICULAR MASS: 251.8 G
LV LATERAL E/E' RATIO: 4.25 M/S
LV SEPTAL E/E' RATIO: 7.29 M/S
MV PEAK A VEL: 0.52 M/S
MV PEAK E VEL: 0.51 M/S
PISA TR MAX VEL: 2.68 M/S
PV PEAK VELOCITY: 1.35 CM/S
RA MAJOR: 3.46 CM
RA PRESSURE: 8 MMHG
RA WIDTH: 4.22 CM
RIGHT VENTRICULAR END-DIASTOLIC DIMENSION: 4.28 CM
RV TISSUE DOPPLER FREE WALL SYSTOLIC VELOCITY 1 (APICAL 4 CHAMBER VIEW): 13.93 CM/S
SINUS: 3.37 CM
STJ: 2.74 CM
TDI LATERAL: 0.12 M/S
TDI SEPTAL: 0.07 M/S
TDI: 0.1 M/S
TR MAX PG: 29 MMHG
TRICUSPID ANNULAR PLANE SYSTOLIC EXCURSION: 2.23 CM
TV REST PULMONARY ARTERY PRESSURE: 37 MMHG

## 2021-01-11 PROCEDURE — 93306 ECHO (CUPID ONLY): ICD-10-PCS | Mod: 26,,, | Performed by: INTERNAL MEDICINE

## 2021-01-11 PROCEDURE — 93306 TTE W/DOPPLER COMPLETE: CPT | Mod: 26,,, | Performed by: INTERNAL MEDICINE

## 2021-01-11 PROCEDURE — 93306 TTE W/DOPPLER COMPLETE: CPT

## 2021-03-09 ENCOUNTER — PATIENT OUTREACH (OUTPATIENT)
Dept: ADMINISTRATIVE | Facility: OTHER | Age: 61
End: 2021-03-09

## 2021-03-11 ENCOUNTER — OFFICE VISIT (OUTPATIENT)
Dept: CARDIOLOGY | Facility: CLINIC | Age: 61
End: 2021-03-11
Payer: COMMERCIAL

## 2021-03-11 VITALS
BODY MASS INDEX: 25.71 KG/M2 | SYSTOLIC BLOOD PRESSURE: 142 MMHG | WEIGHT: 194 LBS | OXYGEN SATURATION: 96 % | DIASTOLIC BLOOD PRESSURE: 72 MMHG | HEIGHT: 73 IN

## 2021-03-11 DIAGNOSIS — I47.29 NSVT (NONSUSTAINED VENTRICULAR TACHYCARDIA): Primary | ICD-10-CM

## 2021-03-11 DIAGNOSIS — I38 VALVULAR HEART DISEASE: ICD-10-CM

## 2021-03-11 DIAGNOSIS — I49.3 PREMATURE VENTRICULAR CONTRACTIONS (PVCS) (VPCS): ICD-10-CM

## 2021-03-11 DIAGNOSIS — I25.10 CORONARY ARTERY DISEASE DUE TO LIPID RICH PLAQUE: ICD-10-CM

## 2021-03-11 DIAGNOSIS — I25.83 CORONARY ARTERY DISEASE DUE TO LIPID RICH PLAQUE: ICD-10-CM

## 2021-03-11 DIAGNOSIS — Q24.5 CORONARY-MYOCARDIAL BRIDGE: ICD-10-CM

## 2021-03-11 DIAGNOSIS — I47.29 POLYMORPHIC VENTRICULAR TACHYCARDIA: ICD-10-CM

## 2021-03-11 DIAGNOSIS — I51.7 BIATRIAL ENLARGEMENT: ICD-10-CM

## 2021-03-11 DIAGNOSIS — I27.20 PULMONARY HYPERTENSION: ICD-10-CM

## 2021-03-11 PROCEDURE — 3008F PR BODY MASS INDEX (BMI) DOCUMENTED: ICD-10-PCS | Mod: CPTII,S$GLB,, | Performed by: INTERNAL MEDICINE

## 2021-03-11 PROCEDURE — 3008F BODY MASS INDEX DOCD: CPT | Mod: CPTII,S$GLB,, | Performed by: INTERNAL MEDICINE

## 2021-03-11 PROCEDURE — 99999 PR PBB SHADOW E&M-EST. PATIENT-LVL III: ICD-10-PCS | Mod: PBBFAC,,, | Performed by: INTERNAL MEDICINE

## 2021-03-11 PROCEDURE — 99213 OFFICE O/P EST LOW 20 MIN: CPT | Mod: S$GLB,,, | Performed by: INTERNAL MEDICINE

## 2021-03-11 PROCEDURE — 1126F PR PAIN SEVERITY QUANTIFIED, NO PAIN PRESENT: ICD-10-PCS | Mod: S$GLB,,, | Performed by: INTERNAL MEDICINE

## 2021-03-11 PROCEDURE — 99213 PR OFFICE/OUTPT VISIT, EST, LEVL III, 20-29 MIN: ICD-10-PCS | Mod: S$GLB,,, | Performed by: INTERNAL MEDICINE

## 2021-03-11 PROCEDURE — 1126F AMNT PAIN NOTED NONE PRSNT: CPT | Mod: S$GLB,,, | Performed by: INTERNAL MEDICINE

## 2021-03-11 PROCEDURE — 99999 PR PBB SHADOW E&M-EST. PATIENT-LVL III: CPT | Mod: PBBFAC,,, | Performed by: INTERNAL MEDICINE

## 2021-04-07 ENCOUNTER — PATIENT MESSAGE (OUTPATIENT)
Dept: OPTOMETRY | Facility: CLINIC | Age: 61
End: 2021-04-07

## 2021-04-07 ENCOUNTER — OFFICE VISIT (OUTPATIENT)
Dept: PODIATRY | Facility: CLINIC | Age: 61
End: 2021-04-07
Payer: COMMERCIAL

## 2021-04-07 ENCOUNTER — OFFICE VISIT (OUTPATIENT)
Dept: OPTOMETRY | Facility: CLINIC | Age: 61
End: 2021-04-07
Payer: COMMERCIAL

## 2021-04-07 VITALS
BODY MASS INDEX: 26.3 KG/M2 | HEIGHT: 73 IN | SYSTOLIC BLOOD PRESSURE: 125 MMHG | DIASTOLIC BLOOD PRESSURE: 84 MMHG | HEART RATE: 87 BPM | RESPIRATION RATE: 18 BRPM | WEIGHT: 198.44 LBS

## 2021-04-07 DIAGNOSIS — H18.30 CORNEAL EPITHELIAL DEFECT: Primary | ICD-10-CM

## 2021-04-07 DIAGNOSIS — H11.431 CONJUNCTIVAL HYPEREMIA OF RIGHT EYE: ICD-10-CM

## 2021-04-07 DIAGNOSIS — G57.82 SURAL NEURITIS, LEFT: Primary | ICD-10-CM

## 2021-04-07 PROCEDURE — 3008F PR BODY MASS INDEX (BMI) DOCUMENTED: ICD-10-PCS | Mod: CPTII,S$GLB,, | Performed by: PODIATRIST

## 2021-04-07 PROCEDURE — 99999 PR PBB SHADOW E&M-EST. PATIENT-LVL III: ICD-10-PCS | Mod: PBBFAC,,, | Performed by: PODIATRIST

## 2021-04-07 PROCEDURE — 1126F PR PAIN SEVERITY QUANTIFIED, NO PAIN PRESENT: ICD-10-PCS | Mod: S$GLB,,, | Performed by: PODIATRIST

## 2021-04-07 PROCEDURE — 99999 PR PBB SHADOW E&M-EST. PATIENT-LVL II: CPT | Mod: PBBFAC,,, | Performed by: OPTOMETRIST

## 2021-04-07 PROCEDURE — 99203 PR OFFICE/OUTPT VISIT, NEW, LEVL III, 30-44 MIN: ICD-10-PCS | Mod: S$GLB,,, | Performed by: PODIATRIST

## 2021-04-07 PROCEDURE — 3008F BODY MASS INDEX DOCD: CPT | Mod: CPTII,S$GLB,, | Performed by: PODIATRIST

## 2021-04-07 PROCEDURE — 92012 PR EYE EXAM, EST PATIENT,INTERMED: ICD-10-PCS | Mod: S$GLB,,, | Performed by: OPTOMETRIST

## 2021-04-07 PROCEDURE — 99203 OFFICE O/P NEW LOW 30 MIN: CPT | Mod: S$GLB,,, | Performed by: PODIATRIST

## 2021-04-07 PROCEDURE — 1126F AMNT PAIN NOTED NONE PRSNT: CPT | Mod: S$GLB,,, | Performed by: PODIATRIST

## 2021-04-07 PROCEDURE — 99999 PR PBB SHADOW E&M-EST. PATIENT-LVL II: ICD-10-PCS | Mod: PBBFAC,,, | Performed by: OPTOMETRIST

## 2021-04-07 PROCEDURE — 99999 PR PBB SHADOW E&M-EST. PATIENT-LVL III: CPT | Mod: PBBFAC,,, | Performed by: PODIATRIST

## 2021-04-07 PROCEDURE — 92012 INTRM OPH EXAM EST PATIENT: CPT | Mod: S$GLB,,, | Performed by: OPTOMETRIST

## 2021-04-07 RX ORDER — TOBRAMYCIN 3 MG/ML
1 SOLUTION/ DROPS OPHTHALMIC EVERY 4 HOURS
Qty: 5 ML | Refills: 0 | Status: SHIPPED | OUTPATIENT
Start: 2021-04-07 | End: 2021-04-11

## 2021-04-20 ENCOUNTER — OFFICE VISIT (OUTPATIENT)
Dept: INTERNAL MEDICINE | Facility: CLINIC | Age: 61
End: 2021-04-20
Payer: COMMERCIAL

## 2021-04-20 ENCOUNTER — LAB VISIT (OUTPATIENT)
Dept: LAB | Facility: HOSPITAL | Age: 61
End: 2021-04-20
Attending: INTERNAL MEDICINE
Payer: COMMERCIAL

## 2021-04-20 VITALS
DIASTOLIC BLOOD PRESSURE: 72 MMHG | BODY MASS INDEX: 26.11 KG/M2 | WEIGHT: 197 LBS | HEART RATE: 78 BPM | SYSTOLIC BLOOD PRESSURE: 118 MMHG | OXYGEN SATURATION: 98 % | HEIGHT: 73 IN
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
## 2021-04-20 PROCEDURE — 1126F AMNT PAIN NOTED NONE PRSNT: CPT | Mod: S$GLB,,, | Performed by: INTERNAL MEDICINE

## 2021-04-20 PROCEDURE — 86618 LYME DISEASE ANTIBODY: CPT | Performed by: INTERNAL MEDICINE

## 2021-04-20 PROCEDURE — 86666 EHRLICHIA ANTIBODY: CPT | Mod: 91 | Performed by: INTERNAL MEDICINE

## 2021-04-20 PROCEDURE — 3008F BODY MASS INDEX DOCD: CPT | Mod: CPTII,S$GLB,, | Performed by: INTERNAL MEDICINE

## 2021-04-20 PROCEDURE — 99213 OFFICE O/P EST LOW 20 MIN: CPT | Mod: S$GLB,,, | Performed by: INTERNAL MEDICINE

## 2021-04-20 PROCEDURE — 99999 PR PBB SHADOW E&M-EST. PATIENT-LVL III: CPT | Mod: PBBFAC,,, | Performed by: INTERNAL MEDICINE

## 2021-04-20 PROCEDURE — 99999 PR PBB SHADOW E&M-EST. PATIENT-LVL III: ICD-10-PCS | Mod: PBBFAC,,, | Performed by: INTERNAL MEDICINE

## 2021-04-20 PROCEDURE — 86757 RICKETTSIA ANTIBODY: CPT | Mod: 91 | Performed by: INTERNAL MEDICINE

## 2021-04-20 PROCEDURE — 3008F PR BODY MASS INDEX (BMI) DOCUMENTED: ICD-10-PCS | Mod: CPTII,S$GLB,, | Performed by: INTERNAL MEDICINE

## 2021-04-20 PROCEDURE — 99213 PR OFFICE/OUTPT VISIT, EST, LEVL III, 20-29 MIN: ICD-10-PCS | Mod: S$GLB,,, | Performed by: INTERNAL MEDICINE

## 2021-04-20 PROCEDURE — 1126F PR PAIN SEVERITY QUANTIFIED, NO PAIN PRESENT: ICD-10-PCS | Mod: S$GLB,,, | Performed by: INTERNAL MEDICINE

## 2021-04-23 LAB
A PHAGOCYTOPH IGG TITR SER IF: NORMAL TITER
B BURGDOR AB SER IA-ACNC: 0.04 INDEX VALUE
E CHAFFEENSIS IGG TITR SER IF: NORMAL TITER
RICK SF IGG TITR SER IF: NORMAL {TITER}
RICK SF IGM TITR SER IF: NORMAL {TITER}

## 2021-09-24 ENCOUNTER — TELEPHONE (OUTPATIENT)
Dept: ORTHOPEDICS | Facility: CLINIC | Age: 61
End: 2021-09-24

## 2021-09-29 ENCOUNTER — TELEPHONE (OUTPATIENT)
Dept: ORTHOPEDICS | Facility: CLINIC | Age: 61
End: 2021-09-29

## 2021-10-04 ENCOUNTER — LAB VISIT (OUTPATIENT)
Dept: LAB | Facility: HOSPITAL | Age: 61
End: 2021-10-04
Attending: INTERNAL MEDICINE
Payer: COMMERCIAL

## 2021-10-04 ENCOUNTER — OFFICE VISIT (OUTPATIENT)
Dept: INTERNAL MEDICINE | Facility: CLINIC | Age: 61
End: 2021-10-04
Payer: COMMERCIAL

## 2021-10-04 VITALS
WEIGHT: 185 LBS | HEIGHT: 73 IN | HEART RATE: 64 BPM | BODY MASS INDEX: 24.52 KG/M2 | DIASTOLIC BLOOD PRESSURE: 74 MMHG | SYSTOLIC BLOOD PRESSURE: 118 MMHG | OXYGEN SATURATION: 98 %

## 2021-10-04 DIAGNOSIS — Z00.00 ANNUAL PHYSICAL EXAM: ICD-10-CM

## 2021-10-04 DIAGNOSIS — N40.0 BENIGN PROSTATIC HYPERPLASIA WITHOUT LOWER URINARY TRACT SYMPTOMS: ICD-10-CM

## 2021-10-04 DIAGNOSIS — Z00.00 ANNUAL PHYSICAL EXAM: Primary | ICD-10-CM

## 2021-10-04 DIAGNOSIS — I49.3 PVC (PREMATURE VENTRICULAR CONTRACTION): ICD-10-CM

## 2021-10-04 PROCEDURE — 3008F PR BODY MASS INDEX (BMI) DOCUMENTED: ICD-10-PCS | Mod: CPTII,S$GLB,, | Performed by: INTERNAL MEDICINE

## 2021-10-04 PROCEDURE — 1159F MED LIST DOCD IN RCRD: CPT | Mod: CPTII,S$GLB,, | Performed by: INTERNAL MEDICINE

## 2021-10-04 PROCEDURE — 3008F BODY MASS INDEX DOCD: CPT | Mod: CPTII,S$GLB,, | Performed by: INTERNAL MEDICINE

## 2021-10-04 PROCEDURE — 99396 PR PREVENTIVE VISIT,EST,40-64: ICD-10-PCS | Mod: S$GLB,,, | Performed by: INTERNAL MEDICINE

## 2021-10-04 PROCEDURE — 3074F PR MOST RECENT SYSTOLIC BLOOD PRESSURE < 130 MM HG: ICD-10-PCS | Mod: CPTII,S$GLB,, | Performed by: INTERNAL MEDICINE

## 2021-10-04 PROCEDURE — 99396 PREV VISIT EST AGE 40-64: CPT | Mod: S$GLB,,, | Performed by: INTERNAL MEDICINE

## 2021-10-04 PROCEDURE — 93010 ELECTROCARDIOGRAM REPORT: CPT | Mod: S$GLB,,, | Performed by: INTERNAL MEDICINE

## 2021-10-04 PROCEDURE — 80053 COMPREHEN METABOLIC PANEL: CPT | Performed by: INTERNAL MEDICINE

## 2021-10-04 PROCEDURE — 93005 EKG 12-LEAD: ICD-10-PCS | Mod: S$GLB,,, | Performed by: INTERNAL MEDICINE

## 2021-10-04 PROCEDURE — 85025 COMPLETE CBC W/AUTO DIFF WBC: CPT | Performed by: INTERNAL MEDICINE

## 2021-10-04 PROCEDURE — 36415 COLL VENOUS BLD VENIPUNCTURE: CPT | Performed by: INTERNAL MEDICINE

## 2021-10-04 PROCEDURE — 93005 ELECTROCARDIOGRAM TRACING: CPT | Mod: S$GLB,,, | Performed by: INTERNAL MEDICINE

## 2021-10-04 PROCEDURE — 3074F SYST BP LT 130 MM HG: CPT | Mod: CPTII,S$GLB,, | Performed by: INTERNAL MEDICINE

## 2021-10-04 PROCEDURE — 84443 ASSAY THYROID STIM HORMONE: CPT | Performed by: INTERNAL MEDICINE

## 2021-10-04 PROCEDURE — 3078F PR MOST RECENT DIASTOLIC BLOOD PRESSURE < 80 MM HG: ICD-10-PCS | Mod: CPTII,S$GLB,, | Performed by: INTERNAL MEDICINE

## 2021-10-04 PROCEDURE — 3078F DIAST BP <80 MM HG: CPT | Mod: CPTII,S$GLB,, | Performed by: INTERNAL MEDICINE

## 2021-10-04 PROCEDURE — 80061 LIPID PANEL: CPT | Performed by: INTERNAL MEDICINE

## 2021-10-04 PROCEDURE — 84153 ASSAY OF PSA TOTAL: CPT | Performed by: INTERNAL MEDICINE

## 2021-10-04 PROCEDURE — 93010 EKG 12-LEAD: ICD-10-PCS | Mod: S$GLB,,, | Performed by: INTERNAL MEDICINE

## 2021-10-04 PROCEDURE — 99999 PR PBB SHADOW E&M-EST. PATIENT-LVL III: ICD-10-PCS | Mod: PBBFAC,,, | Performed by: INTERNAL MEDICINE

## 2021-10-04 PROCEDURE — 1159F PR MEDICATION LIST DOCUMENTED IN MEDICAL RECORD: ICD-10-PCS | Mod: CPTII,S$GLB,, | Performed by: INTERNAL MEDICINE

## 2021-10-04 PROCEDURE — 99999 PR PBB SHADOW E&M-EST. PATIENT-LVL III: CPT | Mod: PBBFAC,,, | Performed by: INTERNAL MEDICINE

## 2021-10-04 RX ORDER — FLUTICASONE PROPIONATE 50 MCG
SPRAY, SUSPENSION (ML) NASAL
COMMUNITY
Start: 2021-06-25 | End: 2023-05-29 | Stop reason: CLARIF

## 2021-10-04 RX ORDER — AZELASTINE 1 MG/ML
SPRAY, METERED NASAL
COMMUNITY
Start: 2021-06-25 | End: 2023-05-29 | Stop reason: CLARIF

## 2021-10-05 LAB
ALBUMIN SERPL BCP-MCNC: 4.1 G/DL (ref 3.5–5.2)
ALP SERPL-CCNC: 49 U/L (ref 55–135)
ALT SERPL W/O P-5'-P-CCNC: 28 U/L (ref 10–44)
ANION GAP SERPL CALC-SCNC: 10 MMOL/L (ref 8–16)
AST SERPL-CCNC: 25 U/L (ref 10–40)
BASOPHILS # BLD AUTO: 0.03 K/UL (ref 0–0.2)
BASOPHILS NFR BLD: 0.4 % (ref 0–1.9)
BILIRUB SERPL-MCNC: 0.7 MG/DL (ref 0.1–1)
BUN SERPL-MCNC: 18 MG/DL (ref 8–23)
CALCIUM SERPL-MCNC: 9.5 MG/DL (ref 8.7–10.5)
CHLORIDE SERPL-SCNC: 104 MMOL/L (ref 95–110)
CHOLEST SERPL-MCNC: 174 MG/DL (ref 120–199)
CHOLEST/HDLC SERPL: 3 {RATIO} (ref 2–5)
CO2 SERPL-SCNC: 24 MMOL/L (ref 23–29)
COMPLEXED PSA SERPL-MCNC: 3.9 NG/ML (ref 0–4)
CREAT SERPL-MCNC: 0.9 MG/DL (ref 0.5–1.4)
DIFFERENTIAL METHOD: ABNORMAL
EOSINOPHIL # BLD AUTO: 0.1 K/UL (ref 0–0.5)
EOSINOPHIL NFR BLD: 0.9 % (ref 0–8)
ERYTHROCYTE [DISTWIDTH] IN BLOOD BY AUTOMATED COUNT: 13.2 % (ref 11.5–14.5)
EST. GFR  (AFRICAN AMERICAN): >60 ML/MIN/1.73 M^2
EST. GFR  (NON AFRICAN AMERICAN): >60 ML/MIN/1.73 M^2
GLUCOSE SERPL-MCNC: 96 MG/DL (ref 70–110)
HCT VFR BLD AUTO: 45.1 % (ref 40–54)
HDLC SERPL-MCNC: 58 MG/DL (ref 40–75)
HDLC SERPL: 33.3 % (ref 20–50)
HGB BLD-MCNC: 14.8 G/DL (ref 14–18)
IMM GRANULOCYTES # BLD AUTO: 0.04 K/UL (ref 0–0.04)
IMM GRANULOCYTES NFR BLD AUTO: 0.6 % (ref 0–0.5)
LDLC SERPL CALC-MCNC: 105.2 MG/DL (ref 63–159)
LYMPHOCYTES # BLD AUTO: 1.8 K/UL (ref 1–4.8)
LYMPHOCYTES NFR BLD: 26.4 % (ref 18–48)
MCH RBC QN AUTO: 31.2 PG (ref 27–31)
MCHC RBC AUTO-ENTMCNC: 32.8 G/DL (ref 32–36)
MCV RBC AUTO: 95 FL (ref 82–98)
MONOCYTES # BLD AUTO: 0.5 K/UL (ref 0.3–1)
MONOCYTES NFR BLD: 7.5 % (ref 4–15)
NEUTROPHILS # BLD AUTO: 4.4 K/UL (ref 1.8–7.7)
NEUTROPHILS NFR BLD: 64.2 % (ref 38–73)
NONHDLC SERPL-MCNC: 116 MG/DL
NRBC BLD-RTO: 0 /100 WBC
PLATELET # BLD AUTO: 210 K/UL (ref 150–450)
PMV BLD AUTO: 10.5 FL (ref 9.2–12.9)
POTASSIUM SERPL-SCNC: 4.6 MMOL/L (ref 3.5–5.1)
PROT SERPL-MCNC: 6.6 G/DL (ref 6–8.4)
RBC # BLD AUTO: 4.74 M/UL (ref 4.6–6.2)
SODIUM SERPL-SCNC: 138 MMOL/L (ref 136–145)
TRIGL SERPL-MCNC: 54 MG/DL (ref 30–150)
TSH SERPL DL<=0.005 MIU/L-ACNC: 0.98 UIU/ML (ref 0.4–4)
WBC # BLD AUTO: 6.79 K/UL (ref 3.9–12.7)

## 2021-10-11 ENCOUNTER — OFFICE VISIT (OUTPATIENT)
Dept: CARDIOLOGY | Facility: CLINIC | Age: 61
End: 2021-10-11
Payer: COMMERCIAL

## 2021-10-11 VITALS
WEIGHT: 184 LBS | HEART RATE: 68 BPM | SYSTOLIC BLOOD PRESSURE: 128 MMHG | DIASTOLIC BLOOD PRESSURE: 80 MMHG | HEIGHT: 73 IN | BODY MASS INDEX: 24.39 KG/M2 | OXYGEN SATURATION: 98 %

## 2021-10-11 DIAGNOSIS — I51.7 BIATRIAL ENLARGEMENT: ICD-10-CM

## 2021-10-11 DIAGNOSIS — I36.1 NONRHEUMATIC TRICUSPID VALVE REGURGITATION: ICD-10-CM

## 2021-10-11 DIAGNOSIS — I34.0 NONRHEUMATIC MITRAL VALVE REGURGITATION: ICD-10-CM

## 2021-10-11 DIAGNOSIS — I49.3 PREMATURE VENTRICULAR CONTRACTIONS (PVCS) (VPCS): ICD-10-CM

## 2021-10-11 DIAGNOSIS — I47.29 POLYMORPHIC VENTRICULAR TACHYCARDIA: ICD-10-CM

## 2021-10-11 DIAGNOSIS — I27.20 PULMONARY HYPERTENSION: ICD-10-CM

## 2021-10-11 DIAGNOSIS — Q24.5 CORONARY-MYOCARDIAL BRIDGE: ICD-10-CM

## 2021-10-11 DIAGNOSIS — I47.29 NSVT (NONSUSTAINED VENTRICULAR TACHYCARDIA): Primary | ICD-10-CM

## 2021-10-11 PROCEDURE — 99214 PR OFFICE/OUTPT VISIT, EST, LEVL IV, 30-39 MIN: ICD-10-PCS | Mod: S$GLB,,, | Performed by: INTERNAL MEDICINE

## 2021-10-11 PROCEDURE — 99999 PR PBB SHADOW E&M-EST. PATIENT-LVL III: CPT | Mod: PBBFAC,,, | Performed by: INTERNAL MEDICINE

## 2021-10-11 PROCEDURE — 3008F BODY MASS INDEX DOCD: CPT | Mod: CPTII,S$GLB,, | Performed by: INTERNAL MEDICINE

## 2021-10-11 PROCEDURE — 3079F DIAST BP 80-89 MM HG: CPT | Mod: CPTII,S$GLB,, | Performed by: INTERNAL MEDICINE

## 2021-10-11 PROCEDURE — 99999 PR PBB SHADOW E&M-EST. PATIENT-LVL III: ICD-10-PCS | Mod: PBBFAC,,, | Performed by: INTERNAL MEDICINE

## 2021-10-11 PROCEDURE — 3074F SYST BP LT 130 MM HG: CPT | Mod: CPTII,S$GLB,, | Performed by: INTERNAL MEDICINE

## 2021-10-11 PROCEDURE — 3008F PR BODY MASS INDEX (BMI) DOCUMENTED: ICD-10-PCS | Mod: CPTII,S$GLB,, | Performed by: INTERNAL MEDICINE

## 2021-10-11 PROCEDURE — 99214 OFFICE O/P EST MOD 30 MIN: CPT | Mod: S$GLB,,, | Performed by: INTERNAL MEDICINE

## 2021-10-11 PROCEDURE — 1159F MED LIST DOCD IN RCRD: CPT | Mod: CPTII,S$GLB,, | Performed by: INTERNAL MEDICINE

## 2021-10-11 PROCEDURE — 3074F PR MOST RECENT SYSTOLIC BLOOD PRESSURE < 130 MM HG: ICD-10-PCS | Mod: CPTII,S$GLB,, | Performed by: INTERNAL MEDICINE

## 2021-10-11 PROCEDURE — 1159F PR MEDICATION LIST DOCUMENTED IN MEDICAL RECORD: ICD-10-PCS | Mod: CPTII,S$GLB,, | Performed by: INTERNAL MEDICINE

## 2021-10-11 PROCEDURE — 3079F PR MOST RECENT DIASTOLIC BLOOD PRESSURE 80-89 MM HG: ICD-10-PCS | Mod: CPTII,S$GLB,, | Performed by: INTERNAL MEDICINE

## 2021-10-30 ENCOUNTER — OFFICE VISIT (OUTPATIENT)
Dept: URGENT CARE | Facility: CLINIC | Age: 61
End: 2021-10-30
Payer: COMMERCIAL

## 2021-10-30 VITALS
TEMPERATURE: 98 F | RESPIRATION RATE: 20 BRPM | OXYGEN SATURATION: 97 % | HEART RATE: 76 BPM | SYSTOLIC BLOOD PRESSURE: 113 MMHG | DIASTOLIC BLOOD PRESSURE: 67 MMHG

## 2021-10-30 DIAGNOSIS — B34.9 VIRAL SYNDROME: ICD-10-CM

## 2021-10-30 DIAGNOSIS — R50.9 FEVER, UNSPECIFIED FEVER CAUSE: Primary | ICD-10-CM

## 2021-10-30 LAB
BILIRUB UR QL STRIP: NEGATIVE
CTP QC/QA: YES
CTP QC/QA: YES
GLUCOSE UR QL STRIP: NEGATIVE
KETONES UR QL STRIP: NEGATIVE
LEUKOCYTE ESTERASE UR QL STRIP: NEGATIVE
PH, POC UA: ABNORMAL
POC BLOOD, URINE: NEGATIVE
POC MOLECULAR INFLUENZA A AGN: NEGATIVE
POC MOLECULAR INFLUENZA B AGN: NEGATIVE
POC NITRATES, URINE: NEGATIVE
PROT UR QL STRIP: POSITIVE
SARS-COV-2 RDRP RESP QL NAA+PROBE: NEGATIVE
SP GR UR STRIP: 1.02 (ref 1–1.03)
UROBILINOGEN UR STRIP-ACNC: POSITIVE (ref 0.3–2.2)

## 2021-10-30 PROCEDURE — 1160F PR REVIEW ALL MEDS BY PRESCRIBER/CLIN PHARMACIST DOCUMENTED: ICD-10-PCS | Mod: CPTII,S$GLB,, | Performed by: FAMILY MEDICINE

## 2021-10-30 PROCEDURE — 1160F RVW MEDS BY RX/DR IN RCRD: CPT | Mod: CPTII,S$GLB,, | Performed by: FAMILY MEDICINE

## 2021-10-30 PROCEDURE — 87502 POCT INFLUENZA A/B MOLECULAR: ICD-10-PCS | Mod: QW,S$GLB,, | Performed by: FAMILY MEDICINE

## 2021-10-30 PROCEDURE — 1159F MED LIST DOCD IN RCRD: CPT | Mod: CPTII,S$GLB,, | Performed by: FAMILY MEDICINE

## 2021-10-30 PROCEDURE — 99214 OFFICE O/P EST MOD 30 MIN: CPT | Mod: 25,S$GLB,, | Performed by: FAMILY MEDICINE

## 2021-10-30 PROCEDURE — 99214 PR OFFICE/OUTPT VISIT, EST, LEVL IV, 30-39 MIN: ICD-10-PCS | Mod: 25,S$GLB,, | Performed by: FAMILY MEDICINE

## 2021-10-30 PROCEDURE — 1159F PR MEDICATION LIST DOCUMENTED IN MEDICAL RECORD: ICD-10-PCS | Mod: CPTII,S$GLB,, | Performed by: FAMILY MEDICINE

## 2021-10-30 PROCEDURE — U0002 COVID-19 LAB TEST NON-CDC: HCPCS | Mod: QW,S$GLB,, | Performed by: FAMILY MEDICINE

## 2021-10-30 PROCEDURE — 3074F PR MOST RECENT SYSTOLIC BLOOD PRESSURE < 130 MM HG: ICD-10-PCS | Mod: CPTII,S$GLB,, | Performed by: FAMILY MEDICINE

## 2021-10-30 PROCEDURE — 3074F SYST BP LT 130 MM HG: CPT | Mod: CPTII,S$GLB,, | Performed by: FAMILY MEDICINE

## 2021-10-30 PROCEDURE — 3078F DIAST BP <80 MM HG: CPT | Mod: CPTII,S$GLB,, | Performed by: FAMILY MEDICINE

## 2021-10-30 PROCEDURE — 81003 POCT URINALYSIS, DIPSTICK, AUTOMATED, W/O SCOPE: ICD-10-PCS | Mod: QW,S$GLB,, | Performed by: FAMILY MEDICINE

## 2021-10-30 PROCEDURE — 87502 INFLUENZA DNA AMP PROBE: CPT | Mod: QW,S$GLB,, | Performed by: FAMILY MEDICINE

## 2021-10-30 PROCEDURE — 3078F PR MOST RECENT DIASTOLIC BLOOD PRESSURE < 80 MM HG: ICD-10-PCS | Mod: CPTII,S$GLB,, | Performed by: FAMILY MEDICINE

## 2021-10-30 PROCEDURE — U0002: ICD-10-PCS | Mod: QW,S$GLB,, | Performed by: FAMILY MEDICINE

## 2021-10-30 PROCEDURE — 81003 URINALYSIS AUTO W/O SCOPE: CPT | Mod: QW,S$GLB,, | Performed by: FAMILY MEDICINE

## 2021-11-02 ENCOUNTER — PATIENT OUTREACH (OUTPATIENT)
Dept: ADMINISTRATIVE | Facility: OTHER | Age: 61
End: 2021-11-02
Payer: COMMERCIAL

## 2021-11-03 ENCOUNTER — PATIENT MESSAGE (OUTPATIENT)
Dept: UROLOGY | Facility: CLINIC | Age: 61
End: 2021-11-03
Payer: COMMERCIAL

## 2021-11-03 ENCOUNTER — TELEPHONE (OUTPATIENT)
Dept: UROLOGY | Facility: CLINIC | Age: 61
End: 2021-11-03
Payer: COMMERCIAL

## 2021-11-04 ENCOUNTER — OFFICE VISIT (OUTPATIENT)
Dept: UROLOGY | Facility: CLINIC | Age: 61
End: 2021-11-04
Payer: COMMERCIAL

## 2021-11-04 DIAGNOSIS — R82.71 BACTERIA IN URINE: ICD-10-CM

## 2021-11-04 DIAGNOSIS — R31.0 GROSS HEMATURIA: ICD-10-CM

## 2021-11-04 DIAGNOSIS — R30.0 DYSURIA: Primary | ICD-10-CM

## 2021-11-04 DIAGNOSIS — N32.81 URGENCY-FREQUENCY SYNDROME: ICD-10-CM

## 2021-11-04 LAB
BILIRUB SERPL-MCNC: NORMAL MG/DL
BLOOD URINE, POC: 50
CLARITY, POC UA: NORMAL
COLOR, POC UA: NORMAL
GLUCOSE UR QL STRIP: NORMAL
KETONES UR QL STRIP: NORMAL
LEUKOCYTE ESTERASE URINE, POC: NORMAL
NITRITE, POC UA: NORMAL
PH, POC UA: 5
PROTEIN, POC: 30
SPECIFIC GRAVITY, POC UA: 1.02
UROBILINOGEN, POC UA: NORMAL

## 2021-11-04 PROCEDURE — 1160F RVW MEDS BY RX/DR IN RCRD: CPT | Mod: CPTII,,, | Performed by: NURSE PRACTITIONER

## 2021-11-04 PROCEDURE — 81002 POCT URINE DIPSTICK WITHOUT MICROSCOPE: ICD-10-PCS | Mod: ,,, | Performed by: NURSE PRACTITIONER

## 2021-11-04 PROCEDURE — 88112 CYTOPATH CELL ENHANCE TECH: CPT | Mod: 26,,, | Performed by: PATHOLOGY

## 2021-11-04 PROCEDURE — 1159F MED LIST DOCD IN RCRD: CPT | Mod: CPTII,,, | Performed by: NURSE PRACTITIONER

## 2021-11-04 PROCEDURE — 99999 PR PBB SHADOW E&M-EST. PATIENT-LVL III: ICD-10-PCS | Mod: PBBFAC,,, | Performed by: NURSE PRACTITIONER

## 2021-11-04 PROCEDURE — 87186 SC STD MICRODIL/AGAR DIL: CPT | Performed by: NURSE PRACTITIONER

## 2021-11-04 PROCEDURE — 99214 PR OFFICE/OUTPT VISIT, EST, LEVL IV, 30-39 MIN: ICD-10-PCS | Mod: ,,, | Performed by: NURSE PRACTITIONER

## 2021-11-04 PROCEDURE — 87088 URINE BACTERIA CULTURE: CPT | Performed by: NURSE PRACTITIONER

## 2021-11-04 PROCEDURE — 99214 OFFICE O/P EST MOD 30 MIN: CPT | Mod: ,,, | Performed by: NURSE PRACTITIONER

## 2021-11-04 PROCEDURE — 88112 PR  CYTOPATH, CELL ENHANCE TECH: ICD-10-PCS | Mod: 26,,, | Performed by: PATHOLOGY

## 2021-11-04 PROCEDURE — 87077 CULTURE AEROBIC IDENTIFY: CPT | Performed by: NURSE PRACTITIONER

## 2021-11-04 PROCEDURE — 81002 URINALYSIS NONAUTO W/O SCOPE: CPT | Mod: ,,, | Performed by: NURSE PRACTITIONER

## 2021-11-04 PROCEDURE — 1159F PR MEDICATION LIST DOCUMENTED IN MEDICAL RECORD: ICD-10-PCS | Mod: CPTII,,, | Performed by: NURSE PRACTITIONER

## 2021-11-04 PROCEDURE — 87086 URINE CULTURE/COLONY COUNT: CPT | Performed by: NURSE PRACTITIONER

## 2021-11-04 PROCEDURE — 99999 PR PBB SHADOW E&M-EST. PATIENT-LVL III: CPT | Mod: PBBFAC,,, | Performed by: NURSE PRACTITIONER

## 2021-11-04 PROCEDURE — 88112 CYTOPATH CELL ENHANCE TECH: CPT | Performed by: PATHOLOGY

## 2021-11-04 PROCEDURE — 1160F PR REVIEW ALL MEDS BY PRESCRIBER/CLIN PHARMACIST DOCUMENTED: ICD-10-PCS | Mod: CPTII,,, | Performed by: NURSE PRACTITIONER

## 2021-11-04 RX ORDER — CIPROFLOXACIN 500 MG/1
500 TABLET ORAL ONCE
Status: CANCELLED | OUTPATIENT
Start: 2021-11-04 | End: 2021-11-04

## 2021-11-04 RX ORDER — NITROFURANTOIN 25; 75 MG/1; MG/1
100 CAPSULE ORAL 2 TIMES DAILY
Qty: 14 CAPSULE | Refills: 0 | Status: SHIPPED | OUTPATIENT
Start: 2021-11-04 | End: 2021-11-11

## 2021-11-04 RX ORDER — LIDOCAINE HYDROCHLORIDE 20 MG/ML
JELLY TOPICAL ONCE
Status: CANCELLED | OUTPATIENT
Start: 2021-11-04 | End: 2021-11-04

## 2021-11-05 LAB
FINAL PATHOLOGIC DIAGNOSIS: NORMAL
Lab: NORMAL

## 2021-11-06 LAB — BACTERIA UR CULT: ABNORMAL

## 2021-11-10 ENCOUNTER — HOSPITAL ENCOUNTER (OUTPATIENT)
Dept: RADIOLOGY | Facility: HOSPITAL | Age: 61
Discharge: HOME | End: 2021-11-10
Attending: NURSE PRACTITIONER
Payer: COMMERCIAL

## 2021-11-10 DIAGNOSIS — R82.71 BACTERIA IN URINE: ICD-10-CM

## 2021-11-10 DIAGNOSIS — R30.0 DYSURIA: ICD-10-CM

## 2021-11-10 PROCEDURE — 76770 US EXAM ABDO BACK WALL COMP: CPT | Mod: 26,,, | Performed by: RADIOLOGY

## 2021-11-10 PROCEDURE — 76770 US EXAM ABDO BACK WALL COMP: CPT | Mod: TC

## 2021-11-10 PROCEDURE — 76770 US RETROPERITONEAL COMPLETE: ICD-10-PCS | Mod: 26,,, | Performed by: RADIOLOGY

## 2021-11-19 ENCOUNTER — PROCEDURE VISIT (OUTPATIENT)
Dept: UROLOGY | Facility: CLINIC | Age: 61
End: 2021-11-19
Payer: COMMERCIAL

## 2021-11-19 VITALS
HEART RATE: 67 BPM | DIASTOLIC BLOOD PRESSURE: 88 MMHG | WEIGHT: 188.31 LBS | HEIGHT: 73 IN | BODY MASS INDEX: 24.96 KG/M2 | RESPIRATION RATE: 16 BRPM | TEMPERATURE: 98 F | SYSTOLIC BLOOD PRESSURE: 146 MMHG

## 2021-11-19 DIAGNOSIS — R30.0 DYSURIA: ICD-10-CM

## 2021-11-19 DIAGNOSIS — R82.71 BACTERIA IN URINE: ICD-10-CM

## 2021-11-19 PROCEDURE — 52000 PR CYSTOURETHROSCOPY: ICD-10-PCS | Mod: S$GLB,,, | Performed by: UROLOGY

## 2021-11-19 PROCEDURE — 52000 CYSTOURETHROSCOPY: CPT | Mod: S$GLB,,, | Performed by: UROLOGY

## 2021-11-19 RX ORDER — LIDOCAINE HYDROCHLORIDE 20 MG/ML
JELLY TOPICAL ONCE
Status: COMPLETED | OUTPATIENT
Start: 2021-11-19 | End: 2021-11-19

## 2021-11-19 RX ADMIN — LIDOCAINE HYDROCHLORIDE: 20 JELLY TOPICAL at 08:11

## 2021-12-01 ENCOUNTER — OFFICE VISIT (OUTPATIENT)
Dept: PODIATRY | Facility: CLINIC | Age: 61
End: 2021-12-01
Payer: COMMERCIAL

## 2021-12-01 VITALS
HEIGHT: 73 IN | WEIGHT: 188.25 LBS | SYSTOLIC BLOOD PRESSURE: 127 MMHG | BODY MASS INDEX: 24.95 KG/M2 | HEART RATE: 78 BPM | DIASTOLIC BLOOD PRESSURE: 78 MMHG

## 2021-12-01 DIAGNOSIS — D36.10 NEUROMA: Primary | ICD-10-CM

## 2021-12-01 PROCEDURE — 99999 PR PBB SHADOW E&M-EST. PATIENT-LVL III: ICD-10-PCS | Mod: PBBFAC,,, | Performed by: PODIATRIST

## 2021-12-01 PROCEDURE — 99214 OFFICE O/P EST MOD 30 MIN: CPT | Mod: S$GLB,,, | Performed by: PODIATRIST

## 2021-12-01 PROCEDURE — 99214 PR OFFICE/OUTPT VISIT, EST, LEVL IV, 30-39 MIN: ICD-10-PCS | Mod: S$GLB,,, | Performed by: PODIATRIST

## 2021-12-01 PROCEDURE — 99999 PR PBB SHADOW E&M-EST. PATIENT-LVL III: CPT | Mod: PBBFAC,,, | Performed by: PODIATRIST

## 2022-01-12 ENCOUNTER — OFFICE VISIT (OUTPATIENT)
Dept: OPTOMETRY | Facility: CLINIC | Age: 62
End: 2022-01-12
Payer: COMMERCIAL

## 2022-01-12 DIAGNOSIS — I65.22 STENOSIS OF LEFT CAROTID ARTERY: ICD-10-CM

## 2022-01-12 DIAGNOSIS — H52.4 PRESBYOPIA: ICD-10-CM

## 2022-01-12 DIAGNOSIS — H04.123 DRY EYE SYNDROME, BILATERAL: ICD-10-CM

## 2022-01-12 DIAGNOSIS — H25.13 NS (NUCLEAR SCLEROSIS), BILATERAL: Primary | ICD-10-CM

## 2022-01-12 DIAGNOSIS — Z98.890 HISTORY OF PHOTOREFRACTIVE KERATECTOMY (PRK): ICD-10-CM

## 2022-01-12 PROCEDURE — 99999 PR PBB SHADOW E&M-EST. PATIENT-LVL II: ICD-10-PCS | Mod: PBBFAC,,, | Performed by: OPTOMETRIST

## 2022-01-12 PROCEDURE — 92015 PR REFRACTION: ICD-10-PCS | Mod: S$GLB,,, | Performed by: OPTOMETRIST

## 2022-01-12 PROCEDURE — 92014 PR EYE EXAM, EST PATIENT,COMPREHESV: ICD-10-PCS | Mod: S$GLB,,, | Performed by: OPTOMETRIST

## 2022-01-12 PROCEDURE — 1159F PR MEDICATION LIST DOCUMENTED IN MEDICAL RECORD: ICD-10-PCS | Mod: CPTII,S$GLB,, | Performed by: OPTOMETRIST

## 2022-01-12 PROCEDURE — 92015 DETERMINE REFRACTIVE STATE: CPT | Mod: S$GLB,,, | Performed by: OPTOMETRIST

## 2022-01-12 PROCEDURE — 99999 PR PBB SHADOW E&M-EST. PATIENT-LVL II: CPT | Mod: PBBFAC,,, | Performed by: OPTOMETRIST

## 2022-01-12 PROCEDURE — 1159F MED LIST DOCD IN RCRD: CPT | Mod: CPTII,S$GLB,, | Performed by: OPTOMETRIST

## 2022-01-12 PROCEDURE — 92014 COMPRE OPH EXAM EST PT 1/>: CPT | Mod: S$GLB,,, | Performed by: OPTOMETRIST

## 2022-01-12 NOTE — PROGRESS NOTES
HPI     Annual eye exam  No c/o about [facility]  Wants to check overall ocular health   No irritation or dryness symptoms x last visit    Eye meds: Refresh BID OU    Last edited by Catarina Campos on 1/12/2022 11:27 AM. (History)            Assessment /Plan     For exam results, see Encounter Report.      Presbyopia  Ok to continue with OTC readers     NS (nuclear sclerosis), bilateral              Mild, monitor     Vitreous syneresis of both eyes              No holes, tears or RD  OD>OS, stable, monitor     Stenosis of left carotid artery              Good internal ocular health today     History of photorefractive keratectomy (PRK)  Dry eye syndrome, bilateral              Use refresh liquigel BID/ PRN     RTC 1 year

## 2022-03-10 ENCOUNTER — PATIENT MESSAGE (OUTPATIENT)
Dept: UROLOGY | Facility: CLINIC | Age: 62
End: 2022-03-10
Payer: COMMERCIAL

## 2022-03-12 ENCOUNTER — OFFICE VISIT (OUTPATIENT)
Dept: URGENT CARE | Facility: CLINIC | Age: 62
End: 2022-03-12
Payer: COMMERCIAL

## 2022-03-12 VITALS
HEART RATE: 72 BPM | HEIGHT: 73 IN | RESPIRATION RATE: 16 BRPM | SYSTOLIC BLOOD PRESSURE: 153 MMHG | WEIGHT: 188 LBS | TEMPERATURE: 98 F | OXYGEN SATURATION: 97 % | DIASTOLIC BLOOD PRESSURE: 93 MMHG | BODY MASS INDEX: 24.92 KG/M2

## 2022-03-12 DIAGNOSIS — R07.89 CHEST TIGHTNESS: ICD-10-CM

## 2022-03-12 DIAGNOSIS — R31.9 HEMATURIA, UNSPECIFIED TYPE: Primary | ICD-10-CM

## 2022-03-12 DIAGNOSIS — N30.01 ACUTE CYSTITIS WITH HEMATURIA: ICD-10-CM

## 2022-03-12 LAB
BILIRUB UR QL STRIP: NEGATIVE
GLUCOSE UR QL STRIP: NEGATIVE
KETONES UR QL STRIP: NEGATIVE
LEUKOCYTE ESTERASE UR QL STRIP: POSITIVE
PH, POC UA: 5 (ref 5–8)
POC BLOOD, URINE: POSITIVE
POC NITRATES, URINE: NEGATIVE
PROT UR QL STRIP: NEGATIVE
SP GR UR STRIP: 1.02 (ref 1–1.03)
UROBILINOGEN UR STRIP-ACNC: ABNORMAL (ref 0.3–2.2)

## 2022-03-12 PROCEDURE — 93005 EKG 12-LEAD: ICD-10-PCS | Mod: S$GLB,,,

## 2022-03-12 PROCEDURE — 87186 SC STD MICRODIL/AGAR DIL: CPT

## 2022-03-12 PROCEDURE — 3080F PR MOST RECENT DIASTOLIC BLOOD PRESSURE >= 90 MM HG: ICD-10-PCS | Mod: CPTII,S$GLB,,

## 2022-03-12 PROCEDURE — 3080F DIAST BP >= 90 MM HG: CPT | Mod: CPTII,S$GLB,,

## 2022-03-12 PROCEDURE — 3077F SYST BP >= 140 MM HG: CPT | Mod: CPTII,S$GLB,,

## 2022-03-12 PROCEDURE — 93010 ELECTROCARDIOGRAM REPORT: CPT | Mod: S$GLB,,, | Performed by: INTERNAL MEDICINE

## 2022-03-12 PROCEDURE — 87086 URINE CULTURE/COLONY COUNT: CPT

## 2022-03-12 PROCEDURE — 93010 EKG 12-LEAD: ICD-10-PCS | Mod: S$GLB,,, | Performed by: INTERNAL MEDICINE

## 2022-03-12 PROCEDURE — 81003 POCT URINALYSIS, DIPSTICK, AUTOMATED, W/O SCOPE: ICD-10-PCS | Mod: QW,S$GLB,,

## 2022-03-12 PROCEDURE — 93005 ELECTROCARDIOGRAM TRACING: CPT | Mod: S$GLB,,,

## 2022-03-12 PROCEDURE — 1159F PR MEDICATION LIST DOCUMENTED IN MEDICAL RECORD: ICD-10-PCS | Mod: CPTII,S$GLB,,

## 2022-03-12 PROCEDURE — 1159F MED LIST DOCD IN RCRD: CPT | Mod: CPTII,S$GLB,,

## 2022-03-12 PROCEDURE — 87088 URINE BACTERIA CULTURE: CPT

## 2022-03-12 PROCEDURE — 3077F PR MOST RECENT SYSTOLIC BLOOD PRESSURE >= 140 MM HG: ICD-10-PCS | Mod: CPTII,S$GLB,,

## 2022-03-12 PROCEDURE — 3008F PR BODY MASS INDEX (BMI) DOCUMENTED: ICD-10-PCS | Mod: CPTII,S$GLB,,

## 2022-03-12 PROCEDURE — 99213 PR OFFICE/OUTPT VISIT, EST, LEVL III, 20-29 MIN: ICD-10-PCS | Mod: S$GLB,,,

## 2022-03-12 PROCEDURE — 1160F RVW MEDS BY RX/DR IN RCRD: CPT | Mod: CPTII,S$GLB,,

## 2022-03-12 PROCEDURE — 1160F PR REVIEW ALL MEDS BY PRESCRIBER/CLIN PHARMACIST DOCUMENTED: ICD-10-PCS | Mod: CPTII,S$GLB,,

## 2022-03-12 PROCEDURE — 81003 URINALYSIS AUTO W/O SCOPE: CPT | Mod: QW,S$GLB,,

## 2022-03-12 PROCEDURE — 87077 CULTURE AEROBIC IDENTIFY: CPT

## 2022-03-12 PROCEDURE — 99213 OFFICE O/P EST LOW 20 MIN: CPT | Mod: S$GLB,,,

## 2022-03-12 PROCEDURE — 3008F BODY MASS INDEX DOCD: CPT | Mod: CPTII,S$GLB,,

## 2022-03-12 RX ORDER — NITROFURANTOIN 25; 75 MG/1; MG/1
100 CAPSULE ORAL 2 TIMES DAILY
Qty: 10 CAPSULE | Refills: 0 | Status: SHIPPED | OUTPATIENT
Start: 2022-03-12 | End: 2022-03-17

## 2022-03-12 NOTE — PROGRESS NOTES
"Subjective:       Patient ID: Jovi Pacheco is a 62 y.o. male.    Vitals:  height is 6' 1" (1.854 m) and weight is 85.3 kg (188 lb). His temperature is 98 °F (36.7 °C). His blood pressure is 153/93 (abnormal) and his pulse is 72. His respiration is 16 and oxygen saturation is 97%.     Chief Complaint: Hematuria    This is a 62 y.o. male who presents today with a chief complaint of   Blood in urine, mostly in the beginning of stream. No pain or sensation. Started Thursday. He has increased his water intake.     States he has been more short of breath with exertion lately. He has feeling some chest tightness. He is not sure if its anxiety related. He states he had covid 1 month ago, but was not hospitalized for it. Last stress test was about 4 years ago and it was normal. Denies chest pain radiating. Denies lower leg swelling. He has hx of PVCs. Denies passing out with exertion.     Hematuria  This is a new problem. The current episode started in the past 7 days. The problem is unchanged. He describes the hematuria as microscopic hematuria. The hematuria occurs during the initial portion of his urinary stream. He reports clotting at the beginning of his urine stream. He is experiencing no pain. He describes his urine color as light pink. Irritative symptoms do not include frequency or urgency. Pertinent negatives include no abdominal pain, chills, dysuria, fever, nausea or vomiting. He is sexually active.       Constitution: Negative for chills, sweating, fatigue and fever.   HENT: Negative for ear pain and congestion.    Neck: Negative for neck pain and neck stiffness.   Cardiovascular: Negative for chest trauma, chest pain, leg swelling, palpitations, sob on exertion and passing out.   Eyes: Negative for eye pain and eye redness.   Respiratory: Negative for cough and sputum production.    Gastrointestinal: Negative for abdominal pain, nausea, vomiting, constipation and diarrhea.   Genitourinary: Positive for " hematuria. Negative for dysuria, frequency, urgency, penile pain, penile swelling, scrotal swelling and testicular pain.   Musculoskeletal: Negative for pain and trauma.   Skin: Negative for color change and pale.   Neurological: Negative for dizziness, light-headedness, headaches, disorientation and altered mental status.   Psychiatric/Behavioral: Negative for altered mental status and disorientation.       Objective:      Physical Exam   Constitutional: He is oriented to person, place, and time. He appears well-developed. No distress.   HENT:   Head: Normocephalic and atraumatic.   Ears:   Right Ear: External ear normal.   Left Ear: External ear normal.   Nose: Nose normal. No nasal deformity. No epistaxis.   Mouth/Throat: Oropharynx is clear and moist and mucous membranes are normal.   Eyes: Conjunctivae and lids are normal.   Neck: Trachea normal and phonation normal. Neck supple.   Cardiovascular: Normal rate, regular rhythm and normal heart sounds.   Pulmonary/Chest: Effort normal and breath sounds normal.   Abdominal: Normal appearance and bowel sounds are normal. He exhibits no distension. Soft. There is no abdominal tenderness.   Musculoskeletal: Normal range of motion.         General: Normal range of motion.   Neurological: He is alert and oriented to person, place, and time. He has normal reflexes.   Skin: Skin is warm, dry, intact and not diaphoretic.   Psychiatric: His speech is normal and behavior is normal. Judgment and thought content normal.   Nursing note and vitals reviewed.        Results for orders placed or performed in visit on 03/12/22   POCT Urinalysis, Dipstick, Automated, W/O Scope   Result Value Ref Range    POC Blood, Urine Positive (A) Negative    POC Bilirubin, Urine Negative Negative    POC Urobilinogen, Urine NORM 0.3 - 2.2    POC Ketones, Urine Negative Negative    POC Protein, Urine Negative Negative    POC Nitrates, Urine Negative Negative    POC Glucose, Urine Negative Negative     pH, UA 5.0 5 - 8    POC Specific Gravity, Urine 1.020 1.003 - 1.029    POC Leukocytes, Urine Positive (A) Negative       Assessment:       1. Hematuria, unspecified type    2. Acute cystitis with hematuria    3. Chest tightness          Plan:         EKG: No acute changes from EKG done on 10/21. Sinus rhythm with PVCs. No ST or T wave abnormalities.     EKG and lab results reviewed with patient.     Hematuria, unspecified type  -     POCT Urinalysis, Dipstick, Automated, W/O Scope    Acute cystitis with hematuria  -     nitrofurantoin, macrocrystal-monohydrate, (MACROBID) 100 MG capsule; Take 1 capsule (100 mg total) by mouth 2 (two) times daily. for 5 days  Dispense: 10 capsule; Refill: 0  -     Culture, Urine    Chest tightness  -     IN OFFICE EKG 12-LEAD (to Muse)                 Patient Instructions   - Today your urine test shows that you have a UTI.   - We have sent out your urine for a urine culture. We will call you once the results come back and change your antibiotic if appropriate.     - Rest.    - Drink plenty of fluids.    - Acetaminophen (tylenol) or Ibuprofen (advil,motrin) as directed as needed for fever/pain. Avoid tylenol if you have a history of liver disease. Do not take ibuprofen if you have a history of GI bleeding, kidney disease, or if you take blood thinners.     - You must understand that you have received an Urgent Care treatment only and that you may be released before all of your medical problems are known or treated.   - You, the patient, will arrange for follow up care as instructed.   - If your condition worsens or fails to improve we recommend that you receive another evaluation at the ER immediately or contact your PCP to discuss your concerns or return here.   - Follow up with your PCP or specialty clinic as directed in the next 1-2 weeks if not improved or as needed.  You can call [phone] to schedule an appointment with the appropriate provider.    If your symptoms do  not improve or worsen, go to the emergency room immediately.

## 2022-03-12 NOTE — PATIENT INSTRUCTIONS
- Today your urine test shows that you have a UTI.   - We have sent out your urine for a urine culture. We will call you once the results come back and change your antibiotic if appropriate.     - Rest.    - Drink plenty of fluids.    - Acetaminophen (tylenol) or Ibuprofen (advil,motrin) as directed as needed for fever/pain. Avoid tylenol if you have a history of liver disease. Do not take ibuprofen if you have a history of GI bleeding, kidney disease, or if you take blood thinners.     - You must understand that you have received an Urgent Care treatment only and that you may be released before all of your medical problems are known or treated.   - You, the patient, will arrange for follow up care as instructed.   - If your condition worsens or fails to improve we recommend that you receive another evaluation at the ER immediately or contact your PCP to discuss your concerns or return here.   - Follow up with your PCP or specialty clinic as directed in the next 1-2 weeks if not improved or as needed.  You can call [phone] to schedule an appointment with the appropriate provider.    If your symptoms do not improve or worsen, go to the emergency room immediately.

## 2022-03-14 LAB — BACTERIA UR CULT: ABNORMAL

## 2022-03-15 ENCOUNTER — PATIENT MESSAGE (OUTPATIENT)
Dept: INTERNAL MEDICINE | Facility: CLINIC | Age: 62
End: 2022-03-15
Payer: COMMERCIAL

## 2022-03-21 ENCOUNTER — TELEPHONE (OUTPATIENT)
Dept: UROLOGY | Facility: CLINIC | Age: 62
End: 2022-03-21
Payer: COMMERCIAL

## 2022-03-21 ENCOUNTER — PATIENT MESSAGE (OUTPATIENT)
Dept: UROLOGY | Facility: CLINIC | Age: 62
End: 2022-03-21
Payer: COMMERCIAL

## 2022-03-23 ENCOUNTER — PATIENT MESSAGE (OUTPATIENT)
Dept: UROLOGY | Facility: CLINIC | Age: 62
End: 2022-03-23
Payer: COMMERCIAL

## 2022-03-23 ENCOUNTER — TELEPHONE (OUTPATIENT)
Dept: UROLOGY | Facility: CLINIC | Age: 62
End: 2022-03-23
Payer: COMMERCIAL

## 2022-03-23 NOTE — TELEPHONE ENCOUNTER
----- Message from Nery Brooks RN sent at 3/21/2022  5:29 PM CDT -----  Contact: pt    ----- Message -----  From: Noemi Lock  Sent: 3/21/2022   3:10 PM CDT  To: [facility] Staff    Pt requesting call back       Confirmed patient's contact info below:  Contact Name: Jovi Pacheco  Phone Number: [phone]

## 2022-04-05 ENCOUNTER — CLINICAL SUPPORT (OUTPATIENT)
Dept: INTERNAL MEDICINE | Facility: CLINIC | Age: 62
End: 2022-04-05

## 2022-04-05 ENCOUNTER — CLINICAL SUPPORT (OUTPATIENT)
Dept: INTERNAL MEDICINE | Facility: CLINIC | Age: 62
End: 2022-04-05
Payer: COMMERCIAL

## 2022-04-05 ENCOUNTER — OFFICE VISIT (OUTPATIENT)
Dept: INTERNAL MEDICINE | Facility: CLINIC | Age: 62
End: 2022-04-05
Payer: COMMERCIAL

## 2022-04-05 VITALS
HEART RATE: 89 BPM | DIASTOLIC BLOOD PRESSURE: 74 MMHG | HEIGHT: 73 IN | WEIGHT: 189 LBS | SYSTOLIC BLOOD PRESSURE: 120 MMHG | TEMPERATURE: 99 F | BODY MASS INDEX: 25.05 KG/M2

## 2022-04-05 DIAGNOSIS — Z00.00 ENCOUNTER FOR ANNUAL HEALTH EXAMINATION: Primary | ICD-10-CM

## 2022-04-05 DIAGNOSIS — Z00.00 ANNUAL PHYSICAL EXAM: Primary | ICD-10-CM

## 2022-04-05 DIAGNOSIS — Z00.00 ROUTINE GENERAL MEDICAL EXAMINATION AT A HEALTH CARE FACILITY: Primary | ICD-10-CM

## 2022-04-05 LAB
ALBUMIN SERPL BCP-MCNC: 4.2 G/DL (ref 3.5–5.2)
ALP SERPL-CCNC: 55 U/L (ref 55–135)
ALT SERPL W/O P-5'-P-CCNC: 32 U/L (ref 10–44)
ANION GAP SERPL CALC-SCNC: 8 MMOL/L (ref 8–16)
AST SERPL-CCNC: 21 U/L (ref 10–40)
BILIRUB SERPL-MCNC: 1.1 MG/DL (ref 0.1–1)
BUN SERPL-MCNC: 18 MG/DL (ref 8–23)
CALCIUM SERPL-MCNC: 9.7 MG/DL (ref 8.7–10.5)
CHLORIDE SERPL-SCNC: 103 MMOL/L (ref 95–110)
CHOLEST SERPL-MCNC: 198 MG/DL (ref 120–199)
CHOLEST/HDLC SERPL: 3.1 {RATIO} (ref 2–5)
CO2 SERPL-SCNC: 28 MMOL/L (ref 23–29)
CREAT SERPL-MCNC: 0.8 MG/DL (ref 0.5–1.4)
ERYTHROCYTE [DISTWIDTH] IN BLOOD BY AUTOMATED COUNT: 13.3 % (ref 11.5–14.5)
EST. GFR  (AFRICAN AMERICAN): >60 ML/MIN/1.73 M^2
EST. GFR  (NON AFRICAN AMERICAN): >60 ML/MIN/1.73 M^2
ESTIMATED AVG GLUCOSE: 117 MG/DL (ref 68–131)
GLUCOSE SERPL-MCNC: 111 MG/DL (ref 70–110)
HBA1C MFR BLD: 5.7 % (ref 4–5.6)
HCT VFR BLD AUTO: 50.3 % (ref 40–54)
HDLC SERPL-MCNC: 63 MG/DL (ref 40–75)
HDLC SERPL: 31.8 % (ref 20–50)
HGB BLD-MCNC: 16.6 G/DL (ref 14–18)
LDLC SERPL CALC-MCNC: 124.6 MG/DL (ref 63–159)
MCH RBC QN AUTO: 31.3 PG (ref 27–31)
MCHC RBC AUTO-ENTMCNC: 33 G/DL (ref 32–36)
MCV RBC AUTO: 95 FL (ref 82–98)
NONHDLC SERPL-MCNC: 135 MG/DL
PLATELET # BLD AUTO: 209 K/UL (ref 150–450)
PMV BLD AUTO: 9.6 FL (ref 9.2–12.9)
POTASSIUM SERPL-SCNC: 4.2 MMOL/L (ref 3.5–5.1)
PROT SERPL-MCNC: 7.1 G/DL (ref 6–8.4)
RBC # BLD AUTO: 5.31 M/UL (ref 4.6–6.2)
SODIUM SERPL-SCNC: 139 MMOL/L (ref 136–145)
TRIGL SERPL-MCNC: 52 MG/DL (ref 30–150)
WBC # BLD AUTO: 6.53 K/UL (ref 3.9–12.7)

## 2022-04-05 PROCEDURE — 99396 PREV VISIT EST AGE 40-64: CPT | Mod: 25,S$GLB,, | Performed by: INTERNAL MEDICINE

## 2022-04-05 PROCEDURE — 3078F DIAST BP <80 MM HG: CPT | Mod: CPTII,S$GLB,, | Performed by: INTERNAL MEDICINE

## 2022-04-05 PROCEDURE — 99999 PR PBB SHADOW E&M-EST. PATIENT-LVL I: CPT | Mod: PBBFAC,,,

## 2022-04-05 PROCEDURE — 3008F BODY MASS INDEX DOCD: CPT | Mod: CPTII,S$GLB,, | Performed by: INTERNAL MEDICINE

## 2022-04-05 PROCEDURE — 99999 PR PBB SHADOW E&M-EST. PATIENT-LVL III: ICD-10-PCS | Mod: PBBFAC,,, | Performed by: INTERNAL MEDICINE

## 2022-04-05 PROCEDURE — 99999 PR PBB SHADOW E&M-EST. PATIENT-LVL I: ICD-10-PCS | Mod: PBBFAC,,,

## 2022-04-05 PROCEDURE — 99999 PR PBB SHADOW E&M-EST. PATIENT-LVL III: CPT | Mod: PBBFAC,,, | Performed by: INTERNAL MEDICINE

## 2022-04-05 PROCEDURE — 80053 COMPREHEN METABOLIC PANEL: CPT | Performed by: INTERNAL MEDICINE

## 2022-04-05 PROCEDURE — 3074F PR MOST RECENT SYSTOLIC BLOOD PRESSURE < 130 MM HG: ICD-10-PCS | Mod: CPTII,S$GLB,, | Performed by: INTERNAL MEDICINE

## 2022-04-05 PROCEDURE — 99396 PR PREVENTIVE VISIT,EST,40-64: ICD-10-PCS | Mod: 25,S$GLB,, | Performed by: INTERNAL MEDICINE

## 2022-04-05 PROCEDURE — 3074F SYST BP LT 130 MM HG: CPT | Mod: CPTII,S$GLB,, | Performed by: INTERNAL MEDICINE

## 2022-04-05 PROCEDURE — 90715 TDAP VACCINE GREATER THAN OR EQUAL TO 7YO IM: ICD-10-PCS | Mod: S$GLB,,, | Performed by: INTERNAL MEDICINE

## 2022-04-05 PROCEDURE — 3078F PR MOST RECENT DIASTOLIC BLOOD PRESSURE < 80 MM HG: ICD-10-PCS | Mod: CPTII,S$GLB,, | Performed by: INTERNAL MEDICINE

## 2022-04-05 PROCEDURE — 83036 HEMOGLOBIN GLYCOSYLATED A1C: CPT | Performed by: INTERNAL MEDICINE

## 2022-04-05 PROCEDURE — 90471 IMMUNIZATION ADMIN: CPT | Mod: S$GLB,,, | Performed by: INTERNAL MEDICINE

## 2022-04-05 PROCEDURE — 90715 TDAP VACCINE 7 YRS/> IM: CPT | Mod: S$GLB,,, | Performed by: INTERNAL MEDICINE

## 2022-04-05 PROCEDURE — 85027 COMPLETE CBC AUTOMATED: CPT | Performed by: INTERNAL MEDICINE

## 2022-04-05 PROCEDURE — 3044F HG A1C LEVEL LT 7.0%: CPT | Mod: CPTII,S$GLB,, | Performed by: INTERNAL MEDICINE

## 2022-04-05 PROCEDURE — 3008F PR BODY MASS INDEX (BMI) DOCUMENTED: ICD-10-PCS | Mod: CPTII,S$GLB,, | Performed by: INTERNAL MEDICINE

## 2022-04-05 PROCEDURE — 80061 LIPID PANEL: CPT | Performed by: INTERNAL MEDICINE

## 2022-04-05 PROCEDURE — 90471 TDAP VACCINE GREATER THAN OR EQUAL TO 7YO IM: ICD-10-PCS | Mod: S$GLB,,, | Performed by: INTERNAL MEDICINE

## 2022-04-05 PROCEDURE — 3044F PR MOST RECENT HEMOGLOBIN A1C LEVEL <7.0%: ICD-10-PCS | Mod: CPTII,S$GLB,, | Performed by: INTERNAL MEDICINE

## 2022-04-05 NOTE — PROGRESS NOTES
Subjective:       Patient ID: Jovi Pacheco is a 62 y.o. male.    Chief Complaint: No chief complaint on file.    HPI   Pt. Has no significant cardiovascular or pulmonary history.  Patient has a history of a positive stress test with subsequent insignificant carotid artery ultrasound and angiogram.  Patient has been advised to keep his heart rate at 140 bpm or below.      Physical Limitations:  Patient noted a history of bilateral foot bruising and numbness and left golfer's elbow but denied any limitations to physical activity    Current exercise routine:  Patient currently goes to the gym an average of 4-6 days a week.  Patient ellipticals for 50 minutes, 3-4 days a week and practices upper and lower body resistance training exercises, 2-3 days a week.  Patient does not follow nay formal flexibility routine at the current time    Goals:  Patient set a goal weight of 185 lbs.    Fun Facts:  Patient was very friendly and engaged.  Patient is former  and is dedicated to keeping active and fit.  Patient noted that he used to run but switched to lower impact aerobic exercise to help preserve his joints.  Patient asked questions and was receptive to all recommendations made.      Review of Systems      Objective:     The fitness evaluation results are as follows:  D.O.S. 4/5/2022   Height (in): 73   Weight (lbs): 189.6   BMI: 25.705082   Body Fat (%): 15.80   Waist (cm): 94   Hip (cm): 101   WHR: 0.93   RBP (mmHg): 142/94   RHR (bpm): 70    Strength R (lbs)t: 110    Strength Lt (lbs): 108.35903   Push-up Assessment: 52   Curl-up Assessment: 75   Flexibility Testing (cm): 34   REE (kcals): 1935       Physical Exam    Assessment:     Age/gender stratified assessment:  Resting BP: Elevated   Body Fat %: Excellent   WHR Risk Factor: Low Risk    Strength R: Average    Strength L: Above Average   Upper Body Endurance: Excellent   Abdominal Endurance: Well Above Average   Lower body Flexibiltiy:  Excellent       Problem List Items Addressed This Visit    None     Visit Diagnoses     Routine general medical examination at a health care facility    -  Primary          Plan:       Recommended fitness guidelines:    -150 minutes of moderate intensity aerobic exercise per week or 75 minutes of vigorous intensity aerobic exercise per week.    -2 to 4 days per week of resistance training for each muscle group.      -Daily stretching with a hold of at least 30 seconds per muscle group.

## 2022-04-05 NOTE — PROGRESS NOTES
Subjective:       Patient ID: Jovi Pacheco is a 62 y.o. male.    Chief Complaint: [facility]  Annual health exam. Reviewed medical, surgical, social and family history, medications, appropriate preventive health screenings, as well as vaccination history. Updates as noted below or in assessment and plan.    Close friend of Mrs. Yanique Garcia (my patient as well). Retired. Generally feels well.   Main issue recently was UTI twice in past several months. Hx of BPH. Rezum procedure and neg bx in 2019. Has developed mild LUTS again. Medically he prefers to avoid any daily medications. Never on Flomax or finasteride. Currently no concerning issues. Has had cystoscopy x2 as well.  Hx of possible atherosclerosis. Abnormal stress in past. Had LHC with non-obstructive dz noted in summary but no specific concerning areas that I see in procedural note. Pt notes physician mentioned some small areas of plaque. Hx of left ICA plaque 1 - 39% on US in 2018. On daily aspirin. Declined Lipitor in past. Uses elliptical for exercise. Watches diet generally, eats 2 eggs most mornings. Takes fish oil, co q10, vit c, vit d3, zinc supplements.    Review of Systems   Constitutional: Negative for chills, fatigue and fever.   HENT: Negative for hearing loss and sinus pain.    Eyes: Negative for visual disturbance.   Respiratory: Negative for cough, chest tightness and shortness of breath.    Cardiovascular: Negative for chest pain and leg swelling.   Gastrointestinal: Negative for abdominal pain, blood in stool, diarrhea and nausea.   Genitourinary: Positive for frequency. Negative for difficulty urinating, dysuria, hematuria and urgency.   Musculoskeletal: Positive for arthralgias (Dorsal feet intermittently). Negative for gait problem and joint swelling.   Skin: Negative for rash and wound.   Neurological: Negative for dizziness, syncope, weakness, numbness and headaches.   Psychiatric/Behavioral:  Negative for dysphoric mood. The patient is not nervous/anxious.        Past Medical History:   Diagnosis Date    Allergic rhinitis 12/17/2018    BPH (benign prostatic hyperplasia)     History of actinic keratosis     History of colon polyps 01/24/2019    History of UTI     Insomnia     Keloid cicatrix     PVCs (premature ventricular contractions)     S/P right inguinal hernia repair with mesh 09/01/2017         Current Outpatient Medications:     aspirin (ECOTRIN) 81 MG EC tablet, Take 81 mg by mouth once daily., Disp: , Rfl:     co-enzyme Q-10 30 mg capsule, Take 30 mg by mouth 3 (three) times daily., Disp: , Rfl:     fluticasone propionate (FLONASE) 50 mcg/actuation nasal spray, , Disp: , Rfl:     azelastine (ASTELIN) 137 mcg (0.1 %) nasal spray, , Disp: , Rfl:     fish oil-omega-3 fatty acids 300-1,000 mg capsule, Take by mouth once daily., Disp: , Rfl:     zolpidem (AMBIEN) 5 MG Tab, Take 1 tablet (5 mg total) by mouth nightly as needed., Disp: 30 tablet, Rfl: 1    Past Surgical History:   Procedure Laterality Date    COLONOSCOPY N/A 1/24/2019    Procedure: COLONOSCOPY;  Surgeon: Mekhi Montez MD;  Location: [facility] ENDO [address]);  Service: Endoscopy;  Laterality: N/A;    CORONARY ANGIOGRAPHY Bilateral 2/14/2019    Procedure: ANGIOGRAM, CORONARY ARTERY;  Surgeon: Hayden Barbosa MD;  Location: [facility] CATH LAB;  Service: Cardiology;  Laterality: Bilateral;    CYSTOSCOPY      EYE SURGERY      HERNIA REPAIR Right     inguinal    LEFT HEART CATHETERIZATION Left 2/14/2019    Procedure: Left heart cath;  Surgeon: Hayden Barbosa MD;  Location: [facility] CATH LAB;  Service: Cardiology;  Laterality: Left;       Family History   Problem Relation Age of Onset    Heart disease Mother     Stroke Mother     Cancer Father     Diabetes Mellitus Father     No Known Problems Brother     Melanoma Neg Hx        Social History     Tobacco Use    Smoking status: Never Smoker    Smokeless tobacco:  Never Used   Substance Use Topics    Alcohol use: Yes     Alcohol/week: 2.0 standard drinks     Types: 1 Glasses of wine, 1 Cans of beer per week     Comment: occasional    Drug use: No       Immunization History   Administered Date(s) Administered    COVID-19, MRNA, LN-S, PF (Pfizer) (Purple Cap) 02/25/2021, 03/16/2021    Hepatitis A / Hepatitis B 04/26/2010    Hepatitis A, Adult 01/30/2003, 06/24/2003    Hepatitis B, Adult 04/30/2005, 05/12/2005, 02/14/2007    IPV 01/30/2003    Influenza - Intranasal - Trivalent 10/14/2010    Influenza - Quadrivalent - PF *Preferred* (6 months and older) 01/23/2020, 09/01/2020    Influenza A (H1N1) 2009 Monovalent - IM 04/26/2010    Influenza Split 02/14/2007, 04/26/2010, 10/31/2011    MMR 01/30/2003    Td (ADULT) 01/30/2003    Tdap 04/26/2010, 04/05/2022    Typhoid - ViCPs 05/12/2005    Zoster Recombinant 08/12/2021, 12/21/2021    vaccinia (smallpox) 05/12/2005         Objective:      Vitals:    04/05/22 1030   BP: 120/74   Pulse: 89   Temp: 98.6 °F (37 °C)       Physical Exam  Constitutional:       General: He is not in acute distress.     Appearance: Normal appearance. He is well-developed. He is not ill-appearing.   HENT:      Head: Normocephalic and atraumatic.      Right Ear: Hearing and tympanic membrane normal. There is no impacted cerumen.      Left Ear: Hearing and tympanic membrane normal. There is no impacted cerumen.   Eyes:      Extraocular Movements: Extraocular movements intact.      Conjunctiva/sclera: Conjunctivae normal.      Pupils: Pupils are equal, round, and reactive to light.   Neck:      Vascular: No carotid bruit.   Cardiovascular:      Rate and Rhythm: Normal rate and regular rhythm.      Heart sounds: Normal heart sounds. No murmur heard.  Pulmonary:      Effort: Pulmonary effort is normal. No respiratory distress.      Breath sounds: Normal breath sounds. No wheezing, rhonchi or rales.   Abdominal:      General: Abdomen is flat. There  is no distension.      Palpations: Abdomen is soft.      Tenderness: There is no abdominal tenderness.   Musculoskeletal:         General: Tenderness (Right dorsal foot, between 2nd/3rd MTP, mild.) present. No swelling or deformity. Normal range of motion.      Cervical back: Normal range of motion. No tenderness.      Right lower leg: No edema.      Left lower leg: No edema.   Lymphadenopathy:      Cervical: No cervical adenopathy.   Skin:     General: Skin is warm and dry.      Findings: No lesion or rash.   Neurological:      General: No focal deficit present.      Mental Status: He is alert and oriented to person, place, and time.      Cranial Nerves: No cranial nerve deficit.      Coordination: Coordination normal.      Gait: Gait normal.      Deep Tendon Reflexes: Reflexes normal.   Psychiatric:         Mood and Affect: Mood normal.         Behavior: Behavior normal.         Thought Content: Thought content normal.         Judgment: Judgment normal.         Recent Results (from the past 2016 hour(s))   POCT Urinalysis, Dipstick, Automated, W/O Scope    Collection Time: 03/12/22 10:16 AM   Result Value Ref Range    POC Blood, Urine Positive (A) Negative    POC Bilirubin, Urine Negative Negative    POC Urobilinogen, Urine NORM 0.3 - 2.2    POC Ketones, Urine Negative Negative    POC Protein, Urine Negative Negative    POC Nitrates, Urine Negative Negative    POC Glucose, Urine Negative Negative    pH, UA 5.0 5 - 8    POC Specific Gravity, Urine 1.020 1.003 - 1.029    POC Leukocytes, Urine Positive (A) Negative   Culture, Urine    Collection Time: 03/12/22 10:35 AM    Specimen: Urine, Clean Catch   Result Value Ref Range    Urine Culture, Routine ESCHERICHIA COLI  >100,000 cfu/ml   (A)        Susceptibility    Escherichia coli - CULTURE, URINE     Amp/Sulbactam <=8/4 Sensitive mcg/mL     Ampicillin <=8 Sensitive mcg/mL     Amox/K Clav'ate <=8/4 Sensitive mcg/mL     Ceftriaxone <=1 Sensitive mcg/mL     Cefazolin  <=2 Sensitive mcg/mL     Ciprofloxacin <=1 Sensitive mcg/mL     Cefepime <=2 Sensitive mcg/mL     Ertapenem <=0.5 Sensitive mcg/mL     Nitrofurantoin <=32 Sensitive mcg/mL     Gentamicin <=4 Sensitive mcg/mL     Levofloxacin <=2 Sensitive mcg/mL     Meropenem <=1 Sensitive mcg/mL     Piperacillin/Tazo <=16 Sensitive mcg/mL     Trimeth/Sulfa <=2/38 Sensitive mcg/mL     Tobramycin <=4 Sensitive mcg/mL   Comprehensive Metabolic Panel    Collection Time: 04/05/22  9:25 AM   Result Value Ref Range    Sodium 139 136 - 145 mmol/L    Potassium 4.2 3.5 - 5.1 mmol/L    Chloride 103 95 - 110 mmol/L    CO2 28 23 - 29 mmol/L    Glucose 111 (H) 70 - 110 mg/dL    BUN 18 8 - 23 mg/dL    Creatinine 0.8 0.5 - 1.4 mg/dL    Calcium 9.7 8.7 - 10.5 mg/dL    Total Protein 7.1 6.0 - 8.4 g/dL    Albumin 4.2 3.5 - 5.2 g/dL    Total Bilirubin 1.1 (H) 0.1 - 1.0 mg/dL    Alkaline Phosphatase 55 55 - 135 U/L    AST 21 10 - 40 U/L    ALT 32 10 - 44 U/L    Anion Gap 8 8 - 16 mmol/L    eGFR if African American >60.0 >60 mL/min/1.73 m^2    eGFR if non African American >60.0 >60 mL/min/1.73 m^2   CBC Without Differential    Collection Time: 04/05/22  9:25 AM   Result Value Ref Range    WBC 6.53 3.90 - 12.70 K/uL    RBC 5.31 4.60 - 6.20 M/uL    Hemoglobin 16.6 14.0 - 18.0 g/dL    Hematocrit 50.3 40.0 - 54.0 %    MCV 95 82 - 98 fL    MCH 31.3 (H) 27.0 - 31.0 pg    MCHC 33.0 32.0 - 36.0 g/dL    RDW 13.3 11.5 - 14.5 %    Platelets 209 150 - 450 K/uL    MPV 9.6 9.2 - 12.9 fL   Hemoglobin A1C    Collection Time: 04/05/22  9:25 AM   Result Value Ref Range    Hemoglobin A1C 5.7 (H) 4.0 - 5.6 %    Estimated Avg Glucose 117 68 - 131 mg/dL   Lipid Panel    Collection Time: 04/05/22  9:25 AM   Result Value Ref Range    Cholesterol 198 120 - 199 mg/dL    Triglycerides 52 30 - 150 mg/dL    HDL 63 40 - 75 mg/dL    LDL Cholesterol 124.6 63.0 - 159.0 mg/dL    HDL/Cholesterol Ratio 31.8 20.0 - 50.0 %    Total Cholesterol/HDL Ratio 3.1 2.0 - 5.0    Non-HDL Cholesterol  135 mg/dL          Assessment/Plan:     1) Health Maintenance updates:  - Tdap updated today  - Colon polyp hx. Last scope 2019 with plan for 5 to 10 yr f/u. Got every 7 yrs previously.  - Hx of AK's, possible skin cancer of right eyebrow. Advised Derm f/u (around 2 yrs since last check).  - BP wnl    2) Atherosclerosis - Hx of US with possible left ICA plaque but less than 39%. Hx of [facility] with non-obstructive disease mentioned in summary, though no specific areas of plaque that I can see in procedural note. On daily aspirin. Has avoided meds, including statin. Lipids normal but discussed I would aim for LDL at least < 100. Working on wt, diet, exercise. Repeat lipids 6 mths. Will plan for f/u carotid US later this year, possibly CAC scoring.    3) BPH, hx of UTI x2 in past several mths - Mild LUTS but no recurrence of UTI symptoms recently. Follows with Urology. Rezum procedure in 2019. Borderline PSA level with negative bx in past. Last PSA 3.9 (6 mths ago). Plan for repeat vs Urology f/u in 6 mths. Prefers to avoid medication.    4) Rhinitis - Occasional use of nasal sprays prn.    5) Insomnia - Rare use of ambien prn.

## 2022-04-05 NOTE — PROGRESS NOTES
"Nutrition Assessment  Session Time:  60 minutes  (Initial  Physical)    Client name:  Jovi Pacheco  :  1960  Age:  62 y.o.  Gender:  male    Client states:  In past has been diagnosed with PVC's, however is aware that it is occurring and is mindful not to exceed 140 beats per minute during exercise. Has had skin allergy testing and was found to be allergic to peanuts, corn and eggs all of which cause a runny nose. Mentions that he reads about good nutrition, including the book, "Brain Fog" and has cut back on cheese intake and carbs. His significant other enjoys sweets and they are in the house regularly and he cannot resist them, however he now eats half and tosses the other half out. He intentionally avoids carbs at dinner meal, and reads the labels for corn sugar and sugar, however is honest that he is a grazer and if he cut out snacks he could lose 5#.  In the past he has done 17 hr. fast for nearly 5 months and felt great with an increase in energy. His goals by next visit are to resume the cold water dips for 20 minutes at [place] 5 days a week and to begin again piloting so that he is less bored and less snacking.      Anthropometrics  Height:  6'1"     Weight:  189.6#  BMI:  25.06  % Body Fat:  15.8    Clinical Signs/Symptoms  N/V/D:  none  Appetite:  good       Past Medical History:   Diagnosis Date    Allergic rhinitis 2018    Family history of atherosclerosis     History of colon polyps 2019    Keloid cicatrix     S/P right inguinal hernia repair with mesh 2017       Past Surgical History:   Procedure Laterality Date    COLONOSCOPY N/A 2019    Procedure: COLONOSCOPY;  Surgeon: Mekhi Montez MD;  Location: [facility] ENDO ([address] [place] [address]);  Service: Endoscopy;  Laterality: N/A;    CORONARY ANGIOGRAPHY Bilateral 2019    Procedure: ANGIOGRAM, CORONARY ARTERY;  Surgeon: Hayden Barbosa MD;  Location: [facility] CATH LAB;  Service: Cardiology;  Laterality: " Bilateral;    CYSTOSCOPY      EYE SURGERY      HERNIA REPAIR Right     inguinal    LEFT HEART CATHETERIZATION Left 2/14/2019    Procedure: Left heart cath;  Surgeon: Hayden Barbosa MD;  Location: [facility] CATH LAB;  Service: Cardiology;  Laterality: Left;       Medications    has a current medication list which includes the following prescription(s): aspirin, azelastine, co-enzyme q-10, fish oil-omega-3 fatty acids, fluticasone propionate, and zolpidem.    Vitamins, Minerals, and/or Supplements:  Fish oil, COQ10, zinc, Vitamins C & D, brain supplement (name not recalled)     Food/Medication Interactions:  Reviewed     Food Allergies or Intolerances:  Intolerances to peanuts, corn and eggs -  cause runny nose     Social History    Marital status:    Employment:  Retired     Social History     Tobacco Use    Smoking status: Never Smoker    Smokeless tobacco: Never Used   Substance Use Topics    Alcohol use: Yes     Alcohol/week: 2.0 standard drinks     Types: 1 Glasses of wine, 1 Cans of beer per week     Comment: occasional        Lab Reports   Sodium   Date Value Ref Range Status   04/05/2022 139 136 - 145 mmol/L Final     Potassium   Date Value Ref Range Status   04/05/2022 4.2 3.5 - 5.1 mmol/L Final     Chloride   Date Value Ref Range Status   04/05/2022 103 95 - 110 mmol/L Final     CO2   Date Value Ref Range Status   04/05/2022 28 23 - 29 mmol/L Final     Glucose   Date Value Ref Range Status   04/05/2022 111 (H) 70 - 110 mg/dL Final     BUN   Date Value Ref Range Status   04/05/2022 18 8 - 23 mg/dL Final     Creatinine   Date Value Ref Range Status   04/05/2022 0.8 0.5 - 1.4 mg/dL Final     Calcium   Date Value Ref Range Status   04/05/2022 9.7 8.7 - 10.5 mg/dL Final     Total Protein   Date Value Ref Range Status   04/05/2022 7.1 6.0 - 8.4 g/dL Final     Albumin   Date Value Ref Range Status   04/05/2022 4.2 3.5 - 5.2 g/dL Final     Total Bilirubin   Date Value Ref Range Status   04/05/2022  1.1 (H) 0.1 - 1.0 mg/dL Final     Comment:     For infants and newborns, interpretation of results should be based  on gestational age, weight and in agreement with clinical  observations.    Premature Infant recommended reference ranges:  Up to 24 hours.............<8.0 mg/dL  Up to 48 hours............<12.0 mg/dL  3-5 days..................<15.0 mg/dL  6-29 days.................<15.0 mg/dL       Alkaline Phosphatase   Date Value Ref Range Status   04/05/2022 55 55 - 135 U/L Final     AST   Date Value Ref Range Status   04/05/2022 21 10 - 40 U/L Final     ALT   Date Value Ref Range Status   04/05/2022 32 10 - 44 U/L Final     Anion Gap   Date Value Ref Range Status   04/05/2022 8 8 - 16 mmol/L Final     eGFR if    Date Value Ref Range Status   04/05/2022 >60.0 >60 mL/min/1.73 m^2 Final     eGFR if non    Date Value Ref Range Status   04/05/2022 >60.0 >60 mL/min/1.73 m^2 Final     Comment:     Calculation used to obtain the estimated glomerular filtration  rate (eGFR) is the CKD-EPI equation.         Lab Results   Component Value Date    WBC 6.53 04/05/2022    HGB 16.6 04/05/2022    HCT 50.3 04/05/2022    MCV 95 04/05/2022     04/05/2022        Lab Results   Component Value Date    CHOL 198 04/05/2022     Lab Results   Component Value Date    HDL 63 04/05/2022     Lab Results   Component Value Date    LDLCALC 124.6 04/05/2022     Lab Results   Component Value Date    TRIG 52 04/05/2022     Lab Results   Component Value Date    CHOLHDL 31.8 04/05/2022     Lab Results   Component Value Date    HGBA1C 5.7 (H) 04/05/2022     BP Readings from Last 1 Encounters:   04/05/22 120/74       Food History  Breakfast:  2 - 12 oz. Cups coffee, 1 liter water  Mid-morning Snack:  Eggs, sweet potato hash with pate and ham  Lunch:  none  Mid-afternoon Snack:  Nuts, cheese, fruit  Dinner:  Fish or chicken + veggies, 2% milk or water  H.S. Snack:  sweets  *Fluid intake:  Coffee, water, milk,  ETOH    Exercise History:  4-5x/wk alternates strength and cardio 50 minutes on elliptical    Cultural/Spiritual/Personal Preferences:  None identified    Support System:  significant other    State of Change:  Preparation    Barriers to Change:  temptations of sweets in home, boredom snacker    Diagnosis    Other: No nutrition related diagnosis at this time.     Intervention    RMR (Method:  InBody):  1935 kcal  Activity Factor:  1.4    PETE:  2709 kcal    Goals:  1.  Resume cold water dips in [employer] for 20 minutes 5x/wk (client's choice)  2.  Reduce sugar intake in desserts and sweets by 50%  3.  Resume work as a  whereas less bored and snacking (client's choice)  4.  Daily fluid goal: 94 oz.    Nutrition Education  The following education was provided to the patient:  Complimented patient on proactive role in health maintenance.  Complimented patient on dietary compliance/modifications and resulting health improvements.  Complimented patient on physical activity efforts.  Discussed healthy snacking.   Discussed weight management.  Discussed Heart Healthy Eating.  Suggested dietary modifications based on current dietary behaviors and individual food preferences.  *Lab results were pending at time of consult and so, not discussed with patient.  Discussed sugary foods and/or beverages (potential health consequences of excessive sugar intake, ways to reduce sugar intake, healthy beverage alternatives, etc.).  Discussed recommended servings of fruit/day and food sources of such.  Discussed recommended servings of non-starchy vegetables/day and food sources of such.  Discussed benefits of adequate hydration and recommended fluid intake.  Discussed [facility] client resources (may include but not limited to [facility] Eat Fit Shopping List, Fast Food Guide, Lite Restaurant Guide, and Meal Planning Guide).  Discussed importance of small behavior/habit changes in improving long-term adherence and sustainability.  Discussed  goal setting.  Provided ongoing support, encouragement, and guidance toward improved health efforts.    Patient verbalized understanding of nutrition education and recommendations received.    Handouts Provided  Meal Planning Guide  Restaurant Guide  Eat Fit Shopping List  Eat Fit Alix  Fueling Well On-The-Go  Vitamin/Mineral Guide    Monitoring/Evaluation    Monitor the following:  Weight  BMI  % Body Fat  Caloric intake  Labs: Lipids/HAIC    Follow Up Plan:  Communication with referring healthcare provider is unnecessary at this time as patient presented as part of annual wellness exam.  However, will follow up with patient in 1-2 years.

## 2022-05-10 ENCOUNTER — OFFICE VISIT (OUTPATIENT)
Dept: INFECTIOUS DISEASES | Facility: CLINIC | Age: 62
End: 2022-05-10
Payer: COMMERCIAL

## 2022-05-10 VITALS
WEIGHT: 188.94 LBS | HEART RATE: 84 BPM | DIASTOLIC BLOOD PRESSURE: 75 MMHG | BODY MASS INDEX: 25.04 KG/M2 | SYSTOLIC BLOOD PRESSURE: 123 MMHG | HEIGHT: 73 IN | TEMPERATURE: 99 F

## 2022-05-10 DIAGNOSIS — Z71.84 TRAVEL ADVICE ENCOUNTER: Primary | ICD-10-CM

## 2022-05-10 PROCEDURE — 99999 PR PBB SHADOW E&M-EST. PATIENT-LVL III: CPT | Mod: PBBFAC,,,

## 2022-05-10 PROCEDURE — 99999 PR PBB SHADOW E&M-EST. PATIENT-LVL III: ICD-10-PCS | Mod: PBBFAC,,,

## 2022-05-10 RX ORDER — AZITHROMYCIN 500 MG/1
500 TABLET, FILM COATED ORAL DAILY
Qty: 3 TABLET | Refills: 0 | Status: SHIPPED | OUTPATIENT
Start: 2022-05-10 | End: 2022-05-10

## 2022-05-10 RX ORDER — AZITHROMYCIN 500 MG/1
500 TABLET, FILM COATED ORAL DAILY
Qty: 3 TABLET | Refills: 0 | Status: SHIPPED | OUTPATIENT
Start: 2022-05-10 | End: 2022-05-13

## 2022-05-11 NOTE — PROGRESS NOTES
Infectious Disease Clinic Note  05/11/2022       Subjective:       Patient ID: Yanique Garcia is a 62 y.o. female being seen for an new visit.    Chief Complaint: Travel Consult    HPI   Mr. Pacheco is a 61y/o F with hx of BPH and nonobstructive CAD who presents for pre-travel counseling. He and his wife plan to travel to [facility] for vacation in 8/2022 for 10 days. They are undecided of where they plan to travel after that, but may visit [facility] as well prior to returning to the [place]. [place] staying in [place]      Currently feeling well. No signs or symptoms of acute infection. Was recently treated for a UTI.    Family History   Problem Relation Age of Onset    Heart disease Mother     Hypertension Mother     Stroke Mother     Heart failure Mother     Heart disease Father     Colon polyps Father     Breast cancer Sister     Cancer Sister     Diabetes Sister     Colon cancer Neg Hx     Ovarian cancer Neg Hx      Social History     Socioeconomic History    Marital status:     Number of children: 1   Occupational History    Occupation: Self employed     Employer: OTHER     Employer: [employer]    Tobacco Use    Smoking status: Never Smoker    Smokeless tobacco: Never Used   Substance and Sexual Activity    Alcohol use: Yes     Alcohol/week: 7.0 standard drinks     Types: 7 Glasses of wine per week     Comment: Social     Drug use: No    Sexual activity: Yes     Partners: Male     Birth control/protection: Surgical     Comment: Engaged:      TVH/ NO BSO  1994     Past Surgical History:   Procedure Laterality Date    BLADDER SUSPENSION      CERVIX LESION DESTRUCTION      CHOLECYSTECTOMY  2011    COLONOSCOPY N/A 2/18/2021    Procedure: COLONOSCOPY;  Surgeon: Rosette Solano MD;  Location: [address];  Service: Endoscopy;  Laterality: N/A;  COVID [facility] on 2/15-GT    COLPOSCOPY      DILATION AND CURETTAGE OF UTERUS       GYNECOLOGIC CRYOSURGERY      HYSTERECTOMY  1994    menorrhagia    TONSILLECTOMY  1988    uterine suspension  1987       Patient's Medications   New Prescriptions    AZITHROMYCIN (ZITHROMAX) 500 MG TABLET    Take 1 tablet (500 mg total) by mouth once daily. For severe traveler's diarrhea   Previous Medications    ATORVASTATIN (LIPITOR) 20 MG TABLET    Take 1 tablet (20 mg total) by mouth once daily.    B COMPLEX VITAMINS TABLET    Take 1 tablet by mouth once daily.    CALCIUM-VITAMIN D3 (OS-RODRICK 500 + D3) 500 MG(1,250MG) -200 UNIT PER TABLET    Take 1 tablet by mouth 2 (two) times daily with meals.    ESTRADIOL (ESTRACE) 1 MG TABLET    TAKE 1 TABLET BY MOUTH ONCE DAILY    MULTIVITAMIN WITH MINERALS TABLET    Take 1 tablet by mouth once daily.    NABUMETONE (RELAFEN) 750 MG TABLET    Take 1 tablet (750 mg total) by mouth 2 (two) times daily.    OMEGA-3 FATTY ACIDS-VITAMIN E 1,000 MG CAP    Take 1 capsule by mouth once daily.    PENCICLOVIR (DENAVIR) 1 % CREAM    Apply topically every 2 (two) hours.    PROPRANOLOL (INDERAL LA) 60 MG 24 HR CAPSULE    TAKE 1 CAPSULE(60 MG) BY MOUTH EVERY DAY    TRIAMTERENE-HYDROCHLOROTHIAZIDE 37.5-25 MG (DYAZIDE) 37.5-25 MG PER CAPSULE    Take 1 capsule by mouth every morning. for 7 days    VALACYCLOVIR (VALTREX) 1000 MG TABLET    Take 2 tablets p.o. q.12 hours for 1 day, for each outbreak    ZINC GLUCONATE 50 MG TABLET    Take 50 mg by mouth once daily.   Modified Medications    No medications on file   Discontinued Medications    No medications on file       Patient Active Problem List    Diagnosis Date Noted    Encounter for screening colonoscopy 02/18/2021    Hip pain 01/12/2021    Bilateral leg edema     Neck pain on right side 12/18/2015    Sprain of cervical neck 12/11/2015    Osteopenia 05/07/2014     Note: Unchanged      Migraine 04/24/2014    Gastroesophageal reflux disease 04/25/2013     Note: Unchanged      Anxiety disorder 04/25/2013     Note: Unchanged       "Calculus of gallbladder 04/25/2013     Note: Unchanged      Peptic ulcer 04/25/2013     Note: Unchanged      Migraines, neuralgic 09/20/2012    Hyperlipidemia 09/20/2012     Note: Unchanged         Review of Systems   Review of Systems   Constitutional: Negative for chills, fever and malaise/fatigue.   HENT: Negative for congestion and sore throat.    Eyes: Negative for blurred vision and double vision.   Respiratory: Negative for cough and shortness of breath.    Cardiovascular: Negative for chest pain and palpitations.   Gastrointestinal: Negative for diarrhea and vomiting.   Genitourinary: Negative for dysuria.   Musculoskeletal: Negative for myalgias and neck pain.   Neurological: Negative for dizziness and headaches.   Psychiatric/Behavioral: Negative for substance abuse. The patient is not nervous/anxious.    All other systems reviewed and are negative.          Objective:      /67 (BP Location: Right arm, Patient Position: Sitting)   Pulse 62   Temp 98.3 °F (36.8 °C)   Ht 5' 1" (1.549 m)   Wt 63.2 kg (139 lb 5.3 oz)   LMP 01/01/1994 (Approximate) Comment: TVH/ NO BSO  1994  BMI 26.33 kg/m²   Estimated body mass index is 26.33 kg/m² as calculated from the following:    Height as of this encounter: 5' 1" (1.549 m).    Weight as of this encounter: 63.2 kg (139 lb 5.3 oz).    Physical Exam  Constitutional:       General: She is not in acute distress.     Appearance: She is well-developed.   HENT:      Head: Normocephalic and atraumatic.      Nose: Nose normal.   Eyes:      Conjunctiva/sclera: Conjunctivae normal.      Pupils: Pupils are equal, round, and reactive to light.   Cardiovascular:      Rate and Rhythm: Normal rate and regular rhythm.      Heart sounds: Normal heart sounds.   Pulmonary:      Effort: Pulmonary effort is normal.      Breath sounds: Normal breath sounds.   Abdominal:      General: Bowel sounds are normal. There is no distension.      Palpations: Abdomen is soft. "   Musculoskeletal:         General: No swelling. Normal range of motion.      Cervical back: Normal range of motion and neck supple.   Skin:     General: Skin is warm and dry.   Neurological:      Mental Status: She is alert and oriented to person, place, and time.      Cranial Nerves: No cranial nerve deficit.   Psychiatric:         Mood and Affect: Mood normal.         Behavior: Behavior normal.              No PMHx,   Meds lipitor, inderal (for migraines)  No allergies     Traveling in august for pleasure to [facility].     Assessment:     - Pre-Travel clinic assessment  - Vaccine counseling    Plan:     The patient was provided with an extensive travel guidance packet which provides travel information specific to the patients itinerary.     The patient's medical history was reviewed and the patient was counseled on:  -Dietary precautions.  -Personal protective measures to prevent insect-borne diseases (e.g., malaria, dengue).  -Precautions to prevent exposure to rabies and seek treatment for possible exposures.  -Precautions against sun exposure.  -Precautions against development of DVT during flight.  -Personal and travel safety.    Vaccinations:  The patient's immunization history was reviewed and, based on the patient's itinerary, the following immunizations were ordered:     - Typhoid IM x1  - Yellow fever vaccine  - Menveo    The patient was counseled on the potential side effects of the vaccines. He is aware that given his age (>60) he is at higher risk for adverse events related to the yellow fever vaccine including yellow fever viscerotropic disease. He understands said risk and is opting for immunization.   The patient was encouraged to contact us about any problems that may develop after immunization and possible side effects were reviewed.    Traveler's diarrhea:  The patient was instructed to purchase Imodium over the counter to take in case diarrhea (without blood or fever) develops.     Azithromycin  was ordered for treatment if severe or bloody diarrhea develops and the patient was instructed on use and possible side effects.  - Azithromycin 500 mg PO x 3 days    Insect precautions:   The patient was also instructed to purchase insect repellent containing DEET or [place] and apply according to repellent label instructions.      An anti-malarial agent was prescribed for malaria prophylaxis and possible side effects were reviewed.  - Atovaquone-proguanil 250-100 mg PO qdaily, start 2 days before expected exposure and end 7 days after last day of exposure. (will send script once patient notifies me of final travel plans and duration of stay.)        I spent 30 minutes with the patient.  The entire visit was face-to-face with greater than 50% of the time spent on counseling.    Case discussed with Dr. Dr. Wagoner.  Maria T Reyes PGY-5  Infectious Disease

## 2022-06-08 ENCOUNTER — DOCUMENTATION ONLY (OUTPATIENT)
Dept: INTERNAL MEDICINE | Facility: CLINIC | Age: 62
End: 2022-06-08
Payer: COMMERCIAL

## 2022-06-08 NOTE — PROGRESS NOTES
Initial Health Coaching Assessment  Client name:  Jovi Pacheco  :  1960  Age:  62 y.o.  Gender:  male    Subjective    Client states: that he is doing great in most areas of his life except for sleep; there are many things that affect his sleep.  He had prostate surgery but still gets up in the middle of the night; 1-2x/night.  Mr. Espana is concerned because he realizes the importance of sleep when it comes to his health.  His mother  of a heart attack and had a couple of strokes and another heart attack prior to passing away.  He is wants to improve his sleeping regimen and started wearing his Fitbit watch to bed.  The room where he currently sleeps in with his partner has too many small lights on (distractions).  He sometimes sleeps in another room. His partner recently got a puppy and it has been waking them up in the middle of the night. We finished setting up [employer] thao with his watch.  He will complete assessment as soon as possible.      Vision: I want to maintain my BMI    Strengths: I don't like eating junk    Challenges: not getting enough sleep    Motivator: feeling good    Support: friend    Hobbies: flying his plane    Objective    Anthropometrics  Vitals - 1 value per visit 5/10/2022   SYSTOLIC 123   DIASTOLIC 75   Pulse 84   Temp 98.6   Resp -   SPO2 -   Weight (lb) 188.93   Weight (kg) 85.7   Height 73   BMI (Calculated) 24.9   VISIT REPORT -   Pain Score  -       % Body Fat: 15.8 %  Waist Circumference: 94 cm    Past Medical History:   Diagnosis Date    Allergic rhinitis 2018    BPH (benign prostatic hyperplasia)     History of actinic keratosis     History of colon polyps 2019    History of UTI     Insomnia     Keloid cicatrix     PVCs (premature ventricular contractions)     S/P right inguinal hernia repair with mesh 2017       Past Surgical History:   Procedure Laterality Date    COLONOSCOPY N/A 2019    Procedure: COLONOSCOPY;  Surgeon: Mekhi  MARY KAY Montez MD;  Location: [facility] ENDO (4TH FLR);  Service: Endoscopy;  Laterality: N/A;    CORONARY ANGIOGRAPHY Bilateral 2/14/2019    Procedure: ANGIOGRAM, CORONARY ARTERY;  Surgeon: Hayden Barbosa MD;  Location: [facility] CATH LAB;  Service: Cardiology;  Laterality: Bilateral;    CYSTOSCOPY      EYE SURGERY      HERNIA REPAIR Right     inguinal    LEFT HEART CATHETERIZATION Left 2/14/2019    Procedure: Left heart cath;  Surgeon: Hayden Barbosa MD;  Location: [facility] CATH LAB;  Service: Cardiology;  Laterality: Left;       Medications    has a current medication list which includes the following prescription(s): aspirin, azelastine, co-enzyme q-10, fish oil-omega-3 fatty acids, fluticasone propionate, and zolpidem.    Vitamins, Minerals, and/or Supplements:  Fish oil, COQ10, zinc, Vitamins C & D, brain supplement (name not recalled)     Food Allergies or Intolerances: Intolerances to peanuts, corn and eggs -  cause runny nose      Social History    Marital status:  Significant other  Employment: Retired     Social History     Tobacco Use    Smoking status: Never Smoker    Smokeless tobacco: Never Used   Substance Use Topics    Alcohol use: Yes     Alcohol/week: 2.0 standard drinks     Types: 1 Glasses of wine, 1 Cans of beer per week     Comment: occasional       Lab Reports     Lab Results   Component Value Date    WBC 6.53 04/05/2022    HGB 16.6 04/05/2022    HCT 50.3 04/05/2022    MCV 95 04/05/2022     04/05/2022       Sodium   Date Value Ref Range Status   04/05/2022 139 136 - 145 mmol/L Final     Potassium   Date Value Ref Range Status   04/05/2022 4.2 3.5 - 5.1 mmol/L Final     Chloride   Date Value Ref Range Status   04/05/2022 103 95 - 110 mmol/L Final     CO2   Date Value Ref Range Status   04/05/2022 28 23 - 29 mmol/L Final     Glucose   Date Value Ref Range Status   04/05/2022 111 (H) 70 - 110 mg/dL Final     BUN   Date Value Ref Range Status   04/05/2022 18 8 - 23 mg/dL Final      Creatinine   Date Value Ref Range Status   04/05/2022 0.8 0.5 - 1.4 mg/dL Final     Calcium   Date Value Ref Range Status   04/05/2022 9.7 8.7 - 10.5 mg/dL Final     Total Protein   Date Value Ref Range Status   04/05/2022 7.1 6.0 - 8.4 g/dL Final     Albumin   Date Value Ref Range Status   04/05/2022 4.2 3.5 - 5.2 g/dL Final     Total Bilirubin   Date Value Ref Range Status   04/05/2022 1.1 (H) 0.1 - 1.0 mg/dL Final     Comment:     For infants and newborns, interpretation of results should be based  on gestational age, weight and in agreement with clinical  observations.    Premature Infant recommended reference ranges:  Up to 24 hours.............<8.0 mg/dL  Up to 48 hours............<12.0 mg/dL  3-5 days..................<15.0 mg/dL  6-29 days.................<15.0 mg/dL       Alkaline Phosphatase   Date Value Ref Range Status   04/05/2022 55 55 - 135 U/L Final     AST   Date Value Ref Range Status   04/05/2022 21 10 - 40 U/L Final     ALT   Date Value Ref Range Status   04/05/2022 32 10 - 44 U/L Final     Anion Gap   Date Value Ref Range Status   04/05/2022 8 8 - 16 mmol/L Final     eGFR if    Date Value Ref Range Status   04/05/2022 >60.0 >60 mL/min/1.73 m^2 Final     eGFR if non    Date Value Ref Range Status   04/05/2022 >60.0 >60 mL/min/1.73 m^2 Final     Comment:     Calculation used to obtain the estimated glomerular filtration  rate (eGFR) is the CKD-EPI equation.         Lab Results   Component Value Date    WBC 6.53 04/05/2022    HGB 16.6 04/05/2022    HCT 50.3 04/05/2022    MCV 95 04/05/2022     04/05/2022        Lab Results   Component Value Date    CHOL 198 04/05/2022     Lab Results   Component Value Date    HDL 63 04/05/2022     Lab Results   Component Value Date    LDLCALC 124.6 04/05/2022     Lab Results   Component Value Date    TRIG 52 04/05/2022     Lab Results   Component Value Date    CHOLHDL 31.8 04/05/2022     Lab Results   Component Value Date     HGBA1C 5.7 (H) 04/05/2022     BP Readings from Last 1 Encounters:   05/10/22 123/75       Food History (information available on nutritionist note on 4/5/22)  Breakfast:    Mid-morning Snack:    Lunch:    Mid-afternoon Snack:    Dinner:    H.S. Snack:    *Fluid intake:      Exercise History: He works out at least 5-6 days per week; both weight training and cardio on the [employer]    Support System:  friends    State of Change:  Action    Barriers to Change: snacks a lot at night from boredom    Assessment     Goals Addressed    I will talk my doctor about my prostate and possibly having another surgery.   I will complete the assessment on Go365         Plan    Education: he is having problems sleeping and we talked about going through the sleep hygiene tips and talking to his partner about the things that keep him up at night.  I suggested that he continue to wear his Fitbit so that he can track his sleeping pattern and have that visual.      Handouts Provided: None    HRA Wellness Score: N/A    Follow Up Plan: Follow up in 8 weeks (on 8/3/2022).

## 2022-08-03 ENCOUNTER — DOCUMENTATION ONLY (OUTPATIENT)
Dept: INTERNAL MEDICINE | Facility: CLINIC | Age: 62
End: 2022-08-03

## 2022-08-05 NOTE — PROGRESS NOTES
Follow Up Health Coaching Assessment  Client name:  Jovi Pacheco  :  1960  Age:  62 y.o.  Gender:  male    Subjective    Client states: that he has started wearing his watch to bed and realized just how horrible his sleep is; it is worse than he thought. His best night of sleep is 6:45 and gets 4:45 most nights of the week.  Jovi usually drags when he gets up after a horrible night of sleep.  He notices that his sleep is much better when he is in [place] (second home).  He thinks that the different setting in [place] is more conducive to his sleep.  He would like to live in [place] but feels like renting or selling his property could be beneficial.  Jovi is retired but wants to get back to work; he is a  and wants to approach [employer] for an opportunity do work with them.  He completed his assessment on the  thao.      Objective  He is currently sleeping an average of 5 hours.  His main focus is on getting 7 hours of sleep daily    State of Change:  Preparation     Assessment    Goals:     Goals    Sleep goal: I will try to get 7 hours of sleep a few times per week       Health goal: I will complete the Biometric screening ([employer] Go365)       Plan    Education: None    Follow Up:  Follow up in 5 weeks (on 2022).

## 2022-09-07 ENCOUNTER — DOCUMENTATION ONLY (OUTPATIENT)
Dept: INTERNAL MEDICINE | Facility: CLINIC | Age: 62
End: 2022-09-07

## 2022-09-08 NOTE — PROGRESS NOTES
Follow Up Health Coaching Assessment  Client name:  Jovi Pacheco  :  1960  Age:  62 y.o.  Gender:  male    Subjective    Client states: that he has been exercising a lot and concerned because the [employer] application is not showing any of his exercise routines since the end of July.  We did some troubleshooting and disconnected his watch to see if it would sync once he reconnected it.  Jovi could not remember his password and could not reconnect his watch.  He will call [employer] for some guidance.  Jovi is not sleeping well; the only way to get a good night sleep is if he takes Melatonin and if he is extremely exhausted from the night before.  This only happens once in a while.  He averaged between 4:52 to 5:52 hours.  One night he had over 8 hours of sleep because he had only slept about 3 hours the night before.  He will continue to work on getting more sleep, at least 7 hours/night.  Jovi did not get the Biometric screening completed.      Objective  He is currently sleeping an average of 5 hours.  His main focus is on getting 7 hours of sleep daily    State of Change:  Preparation     Assessment    Goals:     Goals    Sleep goal: I will try to get 7 hours of sleep a few times per week       Health goal: I will complete the Biometric screening ([employer] Go365) and make        my watch is connected and updating.       Plan    Education: None    Follow Up:  Follow up in 7 weeks (on 10/27/2022).

## 2022-10-27 ENCOUNTER — DOCUMENTATION ONLY (OUTPATIENT)
Dept: INTERNAL MEDICINE | Facility: CLINIC | Age: 62
End: 2022-10-27

## 2022-10-27 NOTE — PROGRESS NOTES
Follow Up Health Coaching Assessment  Client name:  Jovi Pacheco  :  1960  Age:  62 y.o.  Gender:  male    Subjective    Client states: that he has been in [place] for the past month or so; he has been getting better sleep; an average of 6 hours.  His watch has not been giving him credit of the 1-2 hours that he is sleeping after he gets up at 4 AM to use the bathroom. Jovi did not hike at all while in [place]; he has been working on fixing the windows in his house most of the month. He will be back in [place] tomorrow and hopes to continue to get at least 6 hours of sleep. Jovi will continue to track his sleep and aim towards 7 hours.      Objective  He is currently sleeping an average of 5 hours.  His main focus is on getting 7 hours of sleep daily    State of Change:  Preparation     Assessment    Goals:     Goals    Sleep goal: I will try to get 7 hours of sleep a few times per week              Plan    Education: None    Follow Up:  Follow up in 8 weeks (on 2022).

## 2022-11-10 ENCOUNTER — LAB VISIT (OUTPATIENT)
Dept: INTERNAL MEDICINE | Facility: CLINIC | Age: 62
End: 2022-11-10
Payer: COMMERCIAL

## 2022-11-10 DIAGNOSIS — Z20.822 ENCOUNTER FOR LABORATORY TESTING FOR COVID-19 VIRUS: ICD-10-CM

## 2022-11-10 LAB — SARS-COV-2 RNA RESP QL NAA+PROBE: NOT DETECTED

## 2022-11-10 PROCEDURE — U0003 INFECTIOUS AGENT DETECTION BY NUCLEIC ACID (DNA OR RNA); SEVERE ACUTE RESPIRATORY SYNDROME CORONAVIRUS 2 (SARS-COV-2) (CORONAVIRUS DISEASE [COVID-19]), AMPLIFIED PROBE TECHNIQUE, MAKING USE OF HIGH THROUGHPUT TECHNOLOGIES AS DESCRIBED BY CMS-2020-01-R: HCPCS | Performed by: INTERNAL MEDICINE

## 2022-11-10 PROCEDURE — U0005 INFEC AGEN DETEC AMPLI PROBE: HCPCS | Performed by: INTERNAL MEDICINE

## 2022-12-20 ENCOUNTER — DOCUMENTATION ONLY (OUTPATIENT)
Dept: INTERNAL MEDICINE | Facility: CLINIC | Age: 62
End: 2022-12-20

## 2022-12-20 ENCOUNTER — CLINICAL SUPPORT (OUTPATIENT)
Dept: INTERNAL MEDICINE | Facility: CLINIC | Age: 62
End: 2022-12-20
Payer: COMMERCIAL

## 2022-12-20 DIAGNOSIS — Z23 NEED FOR IMMUNIZATION AGAINST INFLUENZA: Primary | ICD-10-CM

## 2022-12-20 PROCEDURE — 90686 IIV4 VACC NO PRSV 0.5 ML IM: CPT | Mod: S$GLB,,, | Performed by: INTERNAL MEDICINE

## 2022-12-20 PROCEDURE — 90686 FLU VACCINE (QUAD) GREATER THAN OR EQUAL TO 3YO PRESERVATIVE FREE IM: ICD-10-PCS | Mod: S$GLB,,, | Performed by: INTERNAL MEDICINE

## 2022-12-20 PROCEDURE — 90471 FLU VACCINE (QUAD) GREATER THAN OR EQUAL TO 3YO PRESERVATIVE FREE IM: ICD-10-PCS | Mod: S$GLB,,, | Performed by: INTERNAL MEDICINE

## 2022-12-20 PROCEDURE — 90471 IMMUNIZATION ADMIN: CPT | Mod: S$GLB,,, | Performed by: INTERNAL MEDICINE

## 2022-12-20 NOTE — PROGRESS NOTES
Follow Up Health Coaching Assessment  Client name:  Jovi Pacheco  :  1960  Age:  62 y.o.  Gender:  male    Subjective    Client states: that his sleep has improved some; he started taking a shower and taking a Zyrtec before bed. He is also able to fall asleep faster when he gets up to go to the bathroom in the middle of the night.  Jovi is sleeping an average of 6 hours daily.  He feels tired whenever he does not get enough sleep.  Jovi is able to take a short nap to get through the day.        Objective  Jovi wants to get back on track with his workout regimen    State of Change:  Preparation     Assessment    Goals:     Goals    Sleep goal: I will try to get 7 hours of sleep a few times per week   Exercise goal: I will get back to cardio regimen and light weight lifting - most days of the week              Plan    Education: None    Follow Up:  Follow up in 6 weeks (on 2023).

## 2023-05-07 NOTE — PROGRESS NOTES
MEDICAL HISTORY:  BPH, status post rezum therapy  Nonsustained VT on stress test, left catheterization  revealing nonobstructive plaque  Allergic rhinitis,allergy injections.  Right inguinal hernia repair.  Eye surgery     SOCIAL HISTORY:  Tobacco use , occasional cigar   Alcohol use social but limited  Exercise aerobic activity with walking and walking is dark and occasional weightlifting     FAMILY HISTORY:  Mother is , stroke, heart disease, Alzheimer's.  Father is , unknown type of cancer.  One brother, no known major health Conditions     Screening  Colonoscopy , adenomatous colon polyp      Medications  Prescriptions of Astelin, Flonase, Ambien, rarely uses them , just on occasion    63-year-old male    Presents for an annual routine visit.    Issues that he brought up, to mention, most being musculoskeletal    For the past 4-5 months he reports having golfer's elbow  right elbow.  Episodic discomfort over the medial epicondyle .  He attributes this to weightlifting and  after modifying his workout routine the discomfort has improved    He reports that he has been diagnosed with rotator cuff tear involving his right shoulder.  Lately he has been feeling a degree of discomfort in the anterior aspect of the shoulder.  Because of such he has been doing certain exercise and involving light weights and has notice improvement in the discomfort     He reports a degree of discomfort at the base of both thumbs, particularly when he asked to use certain tools     He reports that he can now feel his knees.  It is not a pain.  It does not feel uncomfortable.  Is just that he can start feeling.  It does not limit him.     He reports that his urination is getting back to what he used to be in the sense of getting up 2-3 times a night to urinate.  A lot will be depending on how much he drinks.  The urine flow is fair.  At times it will be a degree of urgency and incomplete emptying.  He underwent prostate  rezum therapy in 2019 for which afterwards there was improvement in urination.  He is requesting to meet with Urology again as well he inquired about the use of daily Cialis to help with this    In addition he would like to meet with Urology regarding the possibility of circumcision.  He reports in the year 2021 he had recurrent urinary tract infections, 3 in a row that require antibiotic use.  He expresses a concern that not being circumcised could be a factor.  He reports no inflammation around the foreskin and has no difficulty in retracting it    He would like to get an electrocardiogram.  He has had a history of PVCs and on occasion, not frequent he will notice palpitations        Review of symptoms  Reports no chest pain shortness a breath or abdominal pain.  No problems with headaches.  No indigestion heartburn    Examination   Weight 190   Pulse 72   Blood pressure 118/64   HEENT exam no abnormal findings   Neck no thyromegaly no masses   Chest clear breath sounds good effort  Heart regular rate rhythm, no murmurs or gallops   Abdominal exam nontender, soft, no hepatosplenomegaly abdominal masses  Pulses 2+ carotid pulses no bruits 2+ pedal pulses  Extremities no edema  Lymph gland palpable adenopathy  Genitalia no scrotal masses no hernias no abnormality with the foreskin   Digital rectal exam stool is brown, prostate is mildly enlarged smooth no nodules    No pain with internal rotation of both arms at the shoulder joint.  He finds it may be little bit more of a strain with the right shoulder. .  He is able to hold it up against resistance   Not tender over the medial epicondyle   Not tender palpation over the CMC joints of the thumb    Impression   General examination  BPH with nocturia  PVCs with history of nonsustained VT   Medial epicondylitis   Shoulder pain which I think might be more bicipital tendinitis   Thumb pain prior due to CMC arthritis   Bilateral knee arthralgia        Plan   ECG  Routine  labs  X-rays of the thumb and x-rays of the knees to determine if arthritis is developing  Referral to Urology regarding BPH, can discuss the use of Cialis or that of tamsulosin as well as recommendation for circumcision  In regard to the musculoskeletal complaints above, it appears that he is able to function without difficulty but offered option for him to meet with Orthopedics particularly if the medial epicondylitis, thumb pain, shoulder pain gets worse.  In the meantime he can use over-the-counter anti-inflammatory such as Advil Alleve

## 2023-05-08 ENCOUNTER — PATIENT MESSAGE (OUTPATIENT)
Dept: INTERNAL MEDICINE | Facility: CLINIC | Age: 63
End: 2023-05-08
Payer: MEDICARE

## 2023-05-08 ENCOUNTER — OFFICE VISIT (OUTPATIENT)
Dept: INTERNAL MEDICINE | Facility: CLINIC | Age: 63
End: 2023-05-08
Payer: MEDICARE

## 2023-05-08 VITALS
BODY MASS INDEX: 25.19 KG/M2 | SYSTOLIC BLOOD PRESSURE: 122 MMHG | HEIGHT: 73 IN | OXYGEN SATURATION: 96 % | HEART RATE: 75 BPM | WEIGHT: 190.06 LBS | DIASTOLIC BLOOD PRESSURE: 80 MMHG

## 2023-05-08 DIAGNOSIS — R82.81 PYURIA: Primary | ICD-10-CM

## 2023-05-08 DIAGNOSIS — M77.01 MEDIAL EPICONDYLITIS OF RIGHT ELBOW: ICD-10-CM

## 2023-05-08 DIAGNOSIS — M75.21 BICEPS TENDONITIS ON RIGHT: ICD-10-CM

## 2023-05-08 DIAGNOSIS — Z41.2 ENCOUNTER FOR CIRCUMCISION: ICD-10-CM

## 2023-05-08 DIAGNOSIS — N40.1 BENIGN PROSTATIC HYPERPLASIA WITH NOCTURIA: ICD-10-CM

## 2023-05-08 DIAGNOSIS — I49.3 PVC'S (PREMATURE VENTRICULAR CONTRACTIONS): ICD-10-CM

## 2023-05-08 DIAGNOSIS — M25.562 ARTHRALGIA OF BOTH KNEES: ICD-10-CM

## 2023-05-08 DIAGNOSIS — M79.644 BILATERAL THUMB PAIN: ICD-10-CM

## 2023-05-08 DIAGNOSIS — M79.645 BILATERAL THUMB PAIN: ICD-10-CM

## 2023-05-08 DIAGNOSIS — M25.561 ARTHRALGIA OF BOTH KNEES: ICD-10-CM

## 2023-05-08 DIAGNOSIS — R35.1 BENIGN PROSTATIC HYPERPLASIA WITH NOCTURIA: ICD-10-CM

## 2023-05-08 DIAGNOSIS — Z12.5 PROSTATE CANCER SCREENING: ICD-10-CM

## 2023-05-08 DIAGNOSIS — Z00.00 ANNUAL PHYSICAL EXAM: Primary | ICD-10-CM

## 2023-05-08 DIAGNOSIS — Z13.6 ENCOUNTER FOR SCREENING FOR CARDIOVASCULAR DISORDERS: ICD-10-CM

## 2023-05-08 LAB
BACTERIA #/AREA URNS AUTO: ABNORMAL /HPF
BILIRUB UR QL STRIP: NEGATIVE
CLARITY UR REFRACT.AUTO: CLEAR
COLOR UR AUTO: YELLOW
GLUCOSE UR QL STRIP: NEGATIVE
HGB UR QL STRIP: ABNORMAL
KETONES UR QL STRIP: NEGATIVE
LEUKOCYTE ESTERASE UR QL STRIP: ABNORMAL
MICROSCOPIC COMMENT: ABNORMAL
NITRITE UR QL STRIP: NEGATIVE
PH UR STRIP: 7 [PH] (ref 5–8)
PROT UR QL STRIP: NEGATIVE
RBC #/AREA URNS AUTO: 0 /HPF (ref 0–4)
SP GR UR STRIP: 1 (ref 1–1.03)
URN SPEC COLLECT METH UR: ABNORMAL
WBC #/AREA URNS AUTO: 6 /HPF (ref 0–5)

## 2023-05-08 PROCEDURE — 99214 OFFICE O/P EST MOD 30 MIN: CPT | Mod: PBBFAC | Performed by: INTERNAL MEDICINE

## 2023-05-08 PROCEDURE — 93005 ELECTROCARDIOGRAM TRACING: CPT | Mod: PBBFAC | Performed by: INTERNAL MEDICINE

## 2023-05-08 PROCEDURE — 81001 URINALYSIS AUTO W/SCOPE: CPT | Performed by: INTERNAL MEDICINE

## 2023-05-08 PROCEDURE — 99397 PR PREVENTIVE VISIT,EST,65 & OVER: ICD-10-PCS | Mod: GZ,S$PBB,, | Performed by: INTERNAL MEDICINE

## 2023-05-08 PROCEDURE — 99397 PER PM REEVAL EST PAT 65+ YR: CPT | Mod: GZ,S$PBB,, | Performed by: INTERNAL MEDICINE

## 2023-05-08 PROCEDURE — 93010 ELECTROCARDIOGRAM REPORT: CPT | Mod: S$PBB,,, | Performed by: INTERNAL MEDICINE

## 2023-05-08 PROCEDURE — 90677 PCV20 VACCINE IM: CPT | Mod: PBBFAC

## 2023-05-08 PROCEDURE — 99999 PR PBB SHADOW E&M-EST. PATIENT-LVL IV: ICD-10-PCS | Mod: PBBFAC,,, | Performed by: INTERNAL MEDICINE

## 2023-05-08 PROCEDURE — 93010 EKG 12-LEAD: ICD-10-PCS | Mod: S$PBB,,, | Performed by: INTERNAL MEDICINE

## 2023-05-08 PROCEDURE — 99999 PR PBB SHADOW E&M-EST. PATIENT-LVL IV: CPT | Mod: PBBFAC,,, | Performed by: INTERNAL MEDICINE

## 2023-05-08 RX ORDER — TADALAFIL 5 MG/1
5 TABLET ORAL DAILY PRN
Qty: 30 TABLET | Refills: 1 | Status: SHIPPED | OUTPATIENT
Start: 2023-05-08 | End: 2023-07-05

## 2023-05-09 ENCOUNTER — PATIENT MESSAGE (OUTPATIENT)
Dept: INTERNAL MEDICINE | Facility: CLINIC | Age: 63
End: 2023-05-09
Payer: MEDICARE

## 2023-05-09 ENCOUNTER — TELEPHONE (OUTPATIENT)
Dept: UROLOGY | Facility: CLINIC | Age: 63
End: 2023-05-09

## 2023-05-09 ENCOUNTER — OFFICE VISIT (OUTPATIENT)
Dept: UROLOGY | Facility: CLINIC | Age: 63
End: 2023-05-09
Payer: MEDICARE

## 2023-05-09 ENCOUNTER — HOSPITAL ENCOUNTER (OUTPATIENT)
Dept: RADIOLOGY | Facility: HOSPITAL | Age: 63
Discharge: HOME | End: 2023-05-09
Attending: INTERNAL MEDICINE
Payer: MEDICARE

## 2023-05-09 VITALS
WEIGHT: 185.19 LBS | BODY MASS INDEX: 24.43 KG/M2 | DIASTOLIC BLOOD PRESSURE: 98 MMHG | HEART RATE: 62 BPM | SYSTOLIC BLOOD PRESSURE: 140 MMHG

## 2023-05-09 DIAGNOSIS — N40.1 BPH WITH URINARY OBSTRUCTION: ICD-10-CM

## 2023-05-09 DIAGNOSIS — M25.562 ARTHRALGIA OF BOTH KNEES: ICD-10-CM

## 2023-05-09 DIAGNOSIS — M79.645 BILATERAL THUMB PAIN: ICD-10-CM

## 2023-05-09 DIAGNOSIS — N30.00 ACUTE CYSTITIS WITHOUT HEMATURIA: Primary | ICD-10-CM

## 2023-05-09 DIAGNOSIS — M25.561 ARTHRALGIA OF BOTH KNEES: ICD-10-CM

## 2023-05-09 DIAGNOSIS — N40.1 BENIGN PROSTATIC HYPERPLASIA WITH NOCTURIA: ICD-10-CM

## 2023-05-09 DIAGNOSIS — N13.8 BPH WITH URINARY OBSTRUCTION: ICD-10-CM

## 2023-05-09 DIAGNOSIS — N52.9 ERECTILE DYSFUNCTION, UNSPECIFIED ERECTILE DYSFUNCTION TYPE: ICD-10-CM

## 2023-05-09 DIAGNOSIS — Z41.2 ENCOUNTER FOR CIRCUMCISION: ICD-10-CM

## 2023-05-09 DIAGNOSIS — R35.1 BENIGN PROSTATIC HYPERPLASIA WITH NOCTURIA: ICD-10-CM

## 2023-05-09 DIAGNOSIS — M79.644 BILATERAL THUMB PAIN: ICD-10-CM

## 2023-05-09 PROCEDURE — 73565 XR KNEE AP STANDING BILATERAL: ICD-10-PCS | Mod: 26,,, | Performed by: RADIOLOGY

## 2023-05-09 PROCEDURE — 99999 PR PBB SHADOW E&M-EST. PATIENT-LVL IV: ICD-10-PCS | Mod: PBBFAC,,, | Performed by: UROLOGY

## 2023-05-09 PROCEDURE — 99214 OFFICE O/P EST MOD 30 MIN: CPT | Mod: PBBFAC | Performed by: UROLOGY

## 2023-05-09 PROCEDURE — 73140 XR FINGER 2 OR MORE VIEWS LEFT: ICD-10-PCS | Mod: 26,LT,, | Performed by: RADIOLOGY

## 2023-05-09 PROCEDURE — 99204 PR OFFICE/OUTPT VISIT, NEW, LEVL IV, 45-59 MIN: ICD-10-PCS | Mod: S$PBB,,, | Performed by: UROLOGY

## 2023-05-09 PROCEDURE — 73140 X-RAY EXAM OF FINGER(S): CPT | Mod: 26,RT,, | Performed by: RADIOLOGY

## 2023-05-09 PROCEDURE — 73140 X-RAY EXAM OF FINGER(S): CPT | Mod: 26,LT,, | Performed by: RADIOLOGY

## 2023-05-09 PROCEDURE — 73565 X-RAY EXAM OF KNEES: CPT | Mod: TC

## 2023-05-09 PROCEDURE — 73140 X-RAY EXAM OF FINGER(S): CPT | Mod: TC,RT

## 2023-05-09 PROCEDURE — 73140 X-RAY EXAM OF FINGER(S): CPT | Mod: TC,LT

## 2023-05-09 PROCEDURE — 73565 X-RAY EXAM OF KNEES: CPT | Mod: 26,,, | Performed by: RADIOLOGY

## 2023-05-09 PROCEDURE — 99999 PR PBB SHADOW E&M-EST. PATIENT-LVL IV: CPT | Mod: PBBFAC,,, | Performed by: UROLOGY

## 2023-05-09 PROCEDURE — 99204 OFFICE O/P NEW MOD 45 MIN: CPT | Mod: S$PBB,,, | Performed by: UROLOGY

## 2023-05-09 NOTE — H&P (VIEW-ONLY)
Urology - [facility]  Clinic Note    SUBJECTIVE:     Chief Complaint: concerns for Mami    History of Present Illness:  Jovi Pacheco is a 63 y.o. male who presents with concerns for Mami. He has been experiencing dysuria and urinary frequency with urgency (worst than usual) for the past 2 weeks. He does have a culture proven UTI from last year. Ucx currently pending from his PCP today. He was recently given Cialis 5mg qD by his PCP for his LUTS and ED. He had a Rezum with Dr. Zabala in 2019, though states over the past year he has developed the sensation of incomplete bladder emptying, frequency and nocturia x3.    His previous US and cysto from 2021 was negative of remarkable findings.     PVR today: 35cc  UA: negative for all components        PATIENT HISTORY:  Past Medical History:   Diagnosis Date    Allergic rhinitis 12/17/2018    BPH (benign prostatic hyperplasia)     History of actinic keratosis     History of colon polyps 01/24/2019    History of UTI     Insomnia     Keloid cicatrix     PVCs (premature ventricular contractions)     S/P right inguinal hernia repair with mesh 09/01/2017     Past Surgical History:   Procedure Laterality Date    COLONOSCOPY N/A 1/24/2019    Procedure: COLONOSCOPY;  Surgeon: Mekhi Montez MD;  Location: [facility] ENDO ([address] [place] [address]);  Service: Endoscopy;  Laterality: N/A;    CORONARY ANGIOGRAPHY Bilateral 2/14/2019    Procedure: ANGIOGRAM, CORONARY ARTERY;  Surgeon: Hayden Barbosa MD;  Location: [facility] CATH LAB;  Service: Cardiology;  Laterality: Bilateral;    CYSTOSCOPY      EYE SURGERY      HERNIA REPAIR Right     inguinal    LEFT HEART CATHETERIZATION Left 2/14/2019    Procedure: Left heart cath;  Surgeon: Hayden Barbosa MD;  Location: [facility] CATH LAB;  Service: Cardiology;  Laterality: Left;     Family History   Problem Relation Age of Onset    Heart disease Mother     Stroke Mother     Cancer Father     Diabetes Mellitus Father     No Known Problems  Brother     Melanoma Neg Hx      Social History     Socioeconomic History    Marital status:    Tobacco Use    Smoking status: Never    Smokeless tobacco: Never   Substance and Sexual Activity    Alcohol use: Yes     Alcohol/week: 2.0 standard drinks     Types: 1 Glasses of wine, 1 Cans of beer per week     Comment: occasional    Drug use: No       Medications:  Outpatient Encounter Medications as of 5/9/2023   Medication Sig Dispense Refill    aspirin (ECOTRIN) 81 MG EC tablet Take 81 mg by mouth once daily.      azelastine (ASTELIN) 137 mcg (0.1 %) nasal spray       co-enzyme Q-10 30 mg capsule Take 30 mg by mouth 3 (three) times daily.      fish oil-omega-3 fatty acids 300-1,000 mg capsule Take by mouth once daily.      fluticasone propionate (FLONASE) 50 mcg/actuation nasal spray       tadalafiL (CIALIS) 5 MG tablet Take 1 tablet (5 mg total) by mouth daily as needed for Erectile Dysfunction. 30 tablet 1    zolpidem (AMBIEN) 5 MG Tab Take 1 tablet (5 mg total) by mouth nightly as needed. 30 tablet 1     No facility-administered encounter medications on file as of 5/9/2023.     Allergies:  Cat/feline products, Corn, Dog dander, Peanut, and Egg derived    Review of Systems:  Review of Systems   Constitutional:  Negative for chills, fever and malaise/fatigue.   HENT:  Negative for congestion and hearing loss.    Eyes:  Negative for blurred vision.   Respiratory:  Negative for cough and shortness of breath.    Cardiovascular:  Negative for chest pain and palpitations.   Gastrointestinal:  Negative for abdominal pain, blood in stool, constipation, diarrhea, nausea and vomiting.   Genitourinary:  Negative for dysuria and hematuria.   Musculoskeletal:  Negative for back pain and myalgias.   Skin:  Negative for rash.   Neurological:  Negative for sensory change, focal weakness and headaches.   Endo/Heme/Allergies:  Does not bruise/bleed easily.   Psychiatric/Behavioral:  Negative for memory loss.      OBJECTIVE:  "    Estimated body mass index is 24.43 kg/m² as calculated from the following:    Height as of 5/8/23: 6' 1" (1.854 m).    Weight as of this encounter: 84 kg (185 lb 3 oz).    Vital Signs (Most Recent)  Pulse: 62 (05/09/23 0841)  BP: (!) 140/98 (05/09/23 0841)    Physical Exam  Vitals and nursing note reviewed.   Constitutional:       Appearance: Normal appearance.   HENT:      Head: Atraumatic.      Nose: Nose normal.   Eyes:      Extraocular Movements: Extraocular movements intact.      Pupils: Pupils are equal, round, and reactive to light.   Cardiovascular:      Rate and Rhythm: Normal rate.   Pulmonary:      Effort: Pulmonary effort is normal.   Abdominal:      General: Abdomen is flat. There is no distension.      Tenderness: There is no abdominal tenderness. There is no right CVA tenderness or left CVA tenderness.   Musculoskeletal:         General: Normal range of motion.      Cervical back: Normal range of motion.   Skin:     Coloration: Skin is not jaundiced.   Neurological:      General: No focal deficit present.      Mental Status: He is alert and oriented to person, place, and time.   Psychiatric:         Mood and Affect: Mood normal.         Behavior: Behavior normal.     Labs:  Lab Results   Component Value Date    BUN 18 04/05/2022    CREATININE 0.8 04/05/2022    WBC 6.53 04/05/2022    HGB 16.6 04/05/2022    HCT 50.3 04/05/2022     04/05/2022    AST 21 04/05/2022    ALT 32 04/05/2022    ALKPHOS 55 04/05/2022    ALBUMIN 4.2 04/05/2022    HGBA1C 5.7 (H) 04/05/2022        Lab Results   Component Value Date    PSA 3.9 10/04/2021    PSA 4.6 (H) 12/17/2018    PSA 2.6 02/11/2010    PSADIAG 3.2 01/23/2020    PSADIAG 4.6 (H) 07/03/2019    PSADIAG 4.2 (H) 01/25/2019    PSAFREE 0.27 02/15/2010    PSAFREEPCT 10.80 02/15/2010         Imaging:  None recent    ASSESSMENT     1. Acute cystitis without hematuria    2. Benign prostatic hyperplasia with nocturia    3. Encounter for circumcision    4. BPH with " urinary obstruction    5. Erectile dysfunction, unspecified erectile dysfunction type        PLAN:   Jovi was seen today for urinary frequency.    Diagnoses and all orders for this visit:    Acute cystitis without hematuria  -     Cystoscopy; Future  -     US Retroperitoneal Complete; Future  -     US Retroperitoneal Complete    Benign prostatic hyperplasia with nocturia  -     Ambulatory referral/consult to Urology    Encounter for circumcision  -     Ambulatory referral/consult to Urology    BPH with urinary obstruction    Erectile dysfunction, unspecified erectile dysfunction type       Discussed various etiologies for recurrent UTIs as well as the difference between recurrent and persistent UTIs.   Will f/u Ucx sent from 5/8/23  Discussed preventive measures including adequate hydration, cranberry juice, regular voiding, D-Mannose and voiding after intercourse.  FU after US for cysto   Patient desires a circumcision, we explicitly stated that this procedure would most likely not reduce his Mami's, he expressed clear understanding  Continue daily Cialis for LUTS and for ED      Letter to Saturnino Hernandez MD, Saturnino Hernandez MD

## 2023-05-09 NOTE — PROGRESS NOTES
Urology - [facility]  Clinic Note    SUBJECTIVE:     Chief Complaint: concerns for Mami    History of Present Illness:  Jovi Pacheco is a 63 y.o. male who presents with concerns for Mami. He has been experiencing dysuria and urinary frequency with urgency (worst than usual) for the past 2 weeks. He does have a culture proven UTI from last year. Ucx currently pending from his PCP today. He was recently given Cialis 5mg qD by his PCP for his LUTS and ED. He had a Rezum with Dr. Zabala in 2019, though states over the past year he has developed the sensation of incomplete bladder emptying, frequency and nocturia x3.    His previous US and cysto from 2021 was negative of remarkable findings.     PVR today: 35cc  UA: negative for all components        PATIENT HISTORY:  Past Medical History:   Diagnosis Date    Allergic rhinitis 12/17/2018    BPH (benign prostatic hyperplasia)     History of actinic keratosis     History of colon polyps 01/24/2019    History of UTI     Insomnia     Keloid cicatrix     PVCs (premature ventricular contractions)     S/P right inguinal hernia repair with mesh 09/01/2017     Past Surgical History:   Procedure Laterality Date    COLONOSCOPY N/A 1/24/2019    Procedure: COLONOSCOPY;  Surgeon: Mekhi Montez MD;  Location: [facility] ENDO ([address] [place] [address]);  Service: Endoscopy;  Laterality: N/A;    CORONARY ANGIOGRAPHY Bilateral 2/14/2019    Procedure: ANGIOGRAM, CORONARY ARTERY;  Surgeon: Hayden Barbosa MD;  Location: [facility] CATH LAB;  Service: Cardiology;  Laterality: Bilateral;    CYSTOSCOPY      EYE SURGERY      HERNIA REPAIR Right     inguinal    LEFT HEART CATHETERIZATION Left 2/14/2019    Procedure: Left heart cath;  Surgeon: Hayden Barbosa MD;  Location: [facility] CATH LAB;  Service: Cardiology;  Laterality: Left;     Family History   Problem Relation Age of Onset    Heart disease Mother     Stroke Mother     Cancer Father     Diabetes Mellitus Father     No Known Problems  Brother     Melanoma Neg Hx      Social History     Socioeconomic History    Marital status:    Tobacco Use    Smoking status: Never    Smokeless tobacco: Never   Substance and Sexual Activity    Alcohol use: Yes     Alcohol/week: 2.0 standard drinks     Types: 1 Glasses of wine, 1 Cans of beer per week     Comment: occasional    Drug use: No       Medications:  Outpatient Encounter Medications as of 5/9/2023   Medication Sig Dispense Refill    aspirin (ECOTRIN) 81 MG EC tablet Take 81 mg by mouth once daily.      azelastine (ASTELIN) 137 mcg (0.1 %) nasal spray       co-enzyme Q-10 30 mg capsule Take 30 mg by mouth 3 (three) times daily.      fish oil-omega-3 fatty acids 300-1,000 mg capsule Take by mouth once daily.      fluticasone propionate (FLONASE) 50 mcg/actuation nasal spray       tadalafiL (CIALIS) 5 MG tablet Take 1 tablet (5 mg total) by mouth daily as needed for Erectile Dysfunction. 30 tablet 1    zolpidem (AMBIEN) 5 MG Tab Take 1 tablet (5 mg total) by mouth nightly as needed. 30 tablet 1     No facility-administered encounter medications on file as of 5/9/2023.     Allergies:  Cat/feline products, Corn, Dog dander, Peanut, and Egg derived    Review of Systems:  Review of Systems   Constitutional:  Negative for chills, fever and malaise/fatigue.   HENT:  Negative for congestion and hearing loss.    Eyes:  Negative for blurred vision.   Respiratory:  Negative for cough and shortness of breath.    Cardiovascular:  Negative for chest pain and palpitations.   Gastrointestinal:  Negative for abdominal pain, blood in stool, constipation, diarrhea, nausea and vomiting.   Genitourinary:  Negative for dysuria and hematuria.   Musculoskeletal:  Negative for back pain and myalgias.   Skin:  Negative for rash.   Neurological:  Negative for sensory change, focal weakness and headaches.   Endo/Heme/Allergies:  Does not bruise/bleed easily.   Psychiatric/Behavioral:  Negative for memory loss.      OBJECTIVE:  "    Estimated body mass index is 24.43 kg/m² as calculated from the following:    Height as of 5/8/23: 6' 1" (1.854 m).    Weight as of this encounter: 84 kg (185 lb 3 oz).    Vital Signs (Most Recent)  Pulse: 62 (05/09/23 0841)  BP: (!) 140/98 (05/09/23 0841)    Physical Exam  Vitals and nursing note reviewed.   Constitutional:       Appearance: Normal appearance.   HENT:      Head: Atraumatic.      Nose: Nose normal.   Eyes:      Extraocular Movements: Extraocular movements intact.      Pupils: Pupils are equal, round, and reactive to light.   Cardiovascular:      Rate and Rhythm: Normal rate.   Pulmonary:      Effort: Pulmonary effort is normal.   Abdominal:      General: Abdomen is flat. There is no distension.      Tenderness: There is no abdominal tenderness. There is no right CVA tenderness or left CVA tenderness.   Musculoskeletal:         General: Normal range of motion.      Cervical back: Normal range of motion.   Skin:     Coloration: Skin is not jaundiced.   Neurological:      General: No focal deficit present.      Mental Status: He is alert and oriented to person, place, and time.   Psychiatric:         Mood and Affect: Mood normal.         Behavior: Behavior normal.     Labs:  Lab Results   Component Value Date    BUN 18 04/05/2022    CREATININE 0.8 04/05/2022    WBC 6.53 04/05/2022    HGB 16.6 04/05/2022    HCT 50.3 04/05/2022     04/05/2022    AST 21 04/05/2022    ALT 32 04/05/2022    ALKPHOS 55 04/05/2022    ALBUMIN 4.2 04/05/2022    HGBA1C 5.7 (H) 04/05/2022        Lab Results   Component Value Date    PSA 3.9 10/04/2021    PSA 4.6 (H) 12/17/2018    PSA 2.6 02/11/2010    PSADIAG 3.2 01/23/2020    PSADIAG 4.6 (H) 07/03/2019    PSADIAG 4.2 (H) 01/25/2019    PSAFREE 0.27 02/15/2010    PSAFREEPCT 10.80 02/15/2010         Imaging:  None recent    ASSESSMENT     1. Acute cystitis without hematuria    2. Benign prostatic hyperplasia with nocturia    3. Encounter for circumcision    4. BPH with " urinary obstruction    5. Erectile dysfunction, unspecified erectile dysfunction type        PLAN:   Jovi was seen today for urinary frequency.    Diagnoses and all orders for this visit:    Acute cystitis without hematuria  -     Cystoscopy; Future  -     US Retroperitoneal Complete; Future  -     US Retroperitoneal Complete    Benign prostatic hyperplasia with nocturia  -     Ambulatory referral/consult to Urology    Encounter for circumcision  -     Ambulatory referral/consult to Urology    BPH with urinary obstruction    Erectile dysfunction, unspecified erectile dysfunction type       Discussed various etiologies for recurrent UTIs as well as the difference between recurrent and persistent UTIs.   Will f/u Ucx sent from 5/8/23  Discussed preventive measures including adequate hydration, cranberry juice, regular voiding, D-Mannose and voiding after intercourse.  FU after US for cysto   Patient desires a circumcision, we explicitly stated that this procedure would most likely not reduce his Mami's, he expressed clear understanding  Continue daily Cialis for LUTS and for ED      Letter to Saturnino Hernandez MD, Saturnino Hernandez MD

## 2023-05-10 ENCOUNTER — PATIENT MESSAGE (OUTPATIENT)
Dept: INTERNAL MEDICINE | Facility: CLINIC | Age: 63
End: 2023-05-10
Payer: MEDICARE

## 2023-05-10 DIAGNOSIS — M18.0 ARTHRITIS OF CARPOMETACARPAL (CMC) JOINT OF BOTH THUMBS: Primary | ICD-10-CM

## 2023-05-11 ENCOUNTER — PATIENT MESSAGE (OUTPATIENT)
Dept: INTERNAL MEDICINE | Facility: CLINIC | Age: 63
End: 2023-05-11
Payer: MEDICARE

## 2023-05-11 RX ORDER — SULFAMETHOXAZOLE AND TRIMETHOPRIM 800; 160 MG/1; MG/1
1 TABLET ORAL 2 TIMES DAILY
Qty: 60 TABLET | Refills: 0 | Status: SHIPPED | OUTPATIENT
Start: 2023-05-11 | End: 2023-06-10

## 2023-05-17 ENCOUNTER — OFFICE VISIT (OUTPATIENT)
Dept: OPTOMETRY | Facility: CLINIC | Age: 63
End: 2023-05-17
Payer: MEDICARE

## 2023-05-17 DIAGNOSIS — I65.22 STENOSIS OF LEFT CAROTID ARTERY: ICD-10-CM

## 2023-05-17 DIAGNOSIS — H25.13 NS (NUCLEAR SCLEROSIS), BILATERAL: Primary | ICD-10-CM

## 2023-05-17 DIAGNOSIS — H04.123 DRY EYE SYNDROME, BILATERAL: ICD-10-CM

## 2023-05-17 DIAGNOSIS — H40.013 OPEN ANGLE WITH BORDERLINE FINDINGS AND LOW GLAUCOMA RISK IN BOTH EYES: ICD-10-CM

## 2023-05-17 DIAGNOSIS — Z98.890 HISTORY OF PHOTOREFRACTIVE KERATECTOMY (PRK): ICD-10-CM

## 2023-05-17 DIAGNOSIS — H52.4 PRESBYOPIA: ICD-10-CM

## 2023-05-17 DIAGNOSIS — H47.092 OPTIC NERVE ASYMMETRY, LEFT: ICD-10-CM

## 2023-05-17 PROCEDURE — 99214 OFFICE O/P EST MOD 30 MIN: CPT | Mod: S$PBB,,, | Performed by: OPTOMETRIST

## 2023-05-17 PROCEDURE — 99999 PR PBB SHADOW E&M-EST. PATIENT-LVL II: ICD-10-PCS | Mod: PBBFAC,,, | Performed by: OPTOMETRIST

## 2023-05-17 PROCEDURE — 92015 PR REFRACTION: ICD-10-PCS | Mod: ,,, | Performed by: OPTOMETRIST

## 2023-05-17 PROCEDURE — 92133 POSTERIOR SEGMENT OCT OPTIC NERVE(OCULAR COHERENCE TOMOGRAPHY) - OU - BOTH EYES: ICD-10-PCS | Mod: 26,S$PBB,, | Performed by: OPTOMETRIST

## 2023-05-17 PROCEDURE — 92133 CPTRZD OPH DX IMG PST SGM ON: CPT | Mod: PBBFAC | Performed by: OPTOMETRIST

## 2023-05-17 PROCEDURE — 99999 PR PBB SHADOW E&M-EST. PATIENT-LVL II: CPT | Mod: PBBFAC,,, | Performed by: OPTOMETRIST

## 2023-05-17 PROCEDURE — 92015 DETERMINE REFRACTIVE STATE: CPT | Mod: ,,, | Performed by: OPTOMETRIST

## 2023-05-17 PROCEDURE — 99212 OFFICE O/P EST SF 10 MIN: CPT | Mod: PBBFAC | Performed by: OPTOMETRIST

## 2023-05-17 PROCEDURE — 99214 PR OFFICE/OUTPT VISIT, EST, LEVL IV, 30-39 MIN: ICD-10-PCS | Mod: S$PBB,,, | Performed by: OPTOMETRIST

## 2023-05-18 NOTE — PROGRESS NOTES
HPI    Patient here today for REE. Noticeable change in VA OS, no pain or f/f.  Intermittent dry eye  Last edited by Daniella Johnson, OD on 5/17/2023  2:15 PM.            Assessment /Plan     For exam results, see Encounter Report.       NS (nuclear sclerosis), bilateral              Mild, monitor     Vitreous syneresis of both eyes              No holes, tears or RD  OD>OS, stable, monitor     Stenosis of left carotid artery              Good internal ocular health today     History of photorefractive keratectomy (PRK)  Dry eye syndrome, bilateral              Use refresh liquigel BID/ PRN     Optic nerve asymmetry, left  Open angle with borderline findings and low glaucoma risk in both eyes  -     Posterior Segment OCT Optic Nerve- Mild/ borderline thinning OU   Repeat next year      Presbyopia  Rx specs    RTC 1 year, sooner PRN

## 2023-05-29 NOTE — ANESTHESIA PAT ROS NOTE
05/29/2023  Jovi Pacheco is a 63 y.o., male.      Pre-op Assessment          Review of Systems  Anesthesia Hx:  No problems with previous Anesthesia             Denies Family Hx of Anesthesia complications.    Denies Personal Hx of Anesthesia complications.                    Social:  Social Alcohol Use, Non-Smoker       Hematology/Oncology:  Hematology Normal                       --  Cancer in past history:       Other (see Oncology comments)    right   surgery   Oncology Comments: Right eyebrow-Skin cancer removed     EENT/Dental:  EENT/Dental Normal           Cardiovascular:  Exercise tolerance: good       Dysrhythmias           Hx of PVCs/Exercise-Walking the dog daily x1-3 miles.Housework/Clibs stairs in home daily/                         Pulmonary:  Pulmonary Normal                       Renal/:     Acute cystitis without hematuria/Recent UTI             Hepatic/GI:  Hepatic/GI Normal                 Musculoskeletal:  Arthritis   Bilateral thumbs            Neurological:  Neurology Normal                                      Endocrine:  Endocrine Normal            Dermatological:  Skin Normal    Psych:  Psychiatric Normal                 Ariela Montez RN  5/29/23      Anesthesia Assessment: Preoperative EQUATION    Planned Procedure: Procedure(s) (LRB):  CIRCUMCISION (N/A)  Requested Anesthesia Type:General  Surgeon: Last Flores Jr., MD  Service: Urology  Known or anticipated Date of Surgery:6/2/2023    Surgeon notes: reviewed and Acute cystitis without hematuria    Previous anesthesia records:No problems and 8/6/19- DESTRUCTION, PROSTATE, TRANSURETHRAL (Urethra)-MAC- no apparent anesthetic complications and tolerated procedure well- no nausea/vomiting    Anesthesia Hx:  No problems with previous Anesthesia Denies Family Hx of Anesthesia complications.   Denies Personal Hx of  Anesthesia complications.       Airway/Jaw/Neck:  Airway Findings: Mouth Opening: Normal Tongue: Normal  General Airway Assessment: Adult  Improves to I with phonation.  TM Distance: Normal, at least 6 cm       Last PCP note: within 1 month , within [place] , 5/8/23-aSturnino Hernandez MD-IM-Annual physical exam+9 more Dx-Annual Exam  Subspecialty notes: Cardiology: General and Infectious Disease, Ophthalmology/Optometry/Podiattry    Other important co-morbidities:Hx of UTIs/ PVCs/BPH /Acute cystitis without hematuria     Medical History    Diagnosis Date Comment Source   Allergic rhinitis 12/17/2018     BPH (benign prostatic hyperplasia)      History of actinic keratosis      History of colon polyps 01/24/2019     History of UTI      Insomnia      Keloid cicatrix      PVCs (premature ventricular contractions)      S/P right inguinal hernia repair with mesh 09/01/2017       Tests already available:  Results have been reviewed. Labs-5/9/23-PSA,screening/ Lipid Panel/CMP/CBC/UA/UA microscope/EKG-5/8/23         Plan: Phone pending & LM x2 on 5/29/23.     Testing:  None Needed        Patient  has previously scheduled Medical Appointment:Appointment on 6/1/23, prior to the surgery date.    Navigation: Phone Completed                        Consults scheduled.None needed at this time.                          Straight Line to surgery.               No tests, anesthesia preop clinic visit, or consult required.       Ariela Montez RN  5/29/23

## 2023-06-01 ENCOUNTER — ANESTHESIA EVENT (OUTPATIENT)
Dept: SURGERY | Facility: HOSPITAL | Age: 63
End: 2023-06-01
Payer: MEDICARE

## 2023-06-01 ENCOUNTER — TELEPHONE (OUTPATIENT)
Dept: UROLOGY | Facility: CLINIC | Age: 63
End: 2023-06-01
Payer: MEDICARE

## 2023-06-01 ENCOUNTER — HOSPITAL ENCOUNTER (OUTPATIENT)
Dept: RADIOLOGY | Facility: HOSPITAL | Age: 63
Discharge: HOME | End: 2023-06-01
Payer: MEDICARE

## 2023-06-01 PROCEDURE — 76770 US EXAM ABDO BACK WALL COMP: CPT | Mod: 26,,, | Performed by: RADIOLOGY

## 2023-06-01 PROCEDURE — 76770 US EXAM ABDO BACK WALL COMP: CPT | Mod: TC

## 2023-06-01 PROCEDURE — 76770 US RETROPERITONEAL COMPLETE: ICD-10-PCS | Mod: 26,,, | Performed by: RADIOLOGY

## 2023-06-01 NOTE — TELEPHONE ENCOUNTER
----- Message from [place] sent at 6/1/2023  2:53 PM CDT -----  Regarding: appt  Contact: [phone]  Pt is schedule for procedure on tomorrow and would like to know If able to drink water before procedure. Please call pt  . Pt would like to discuss plan of care.

## 2023-06-01 NOTE — ANESTHESIA PREPROCEDURE EVALUATION
[facility]  Anesthesia Pre-Operative Evaluation         Patient Name: Jovi Pacheco  YOB: 1960  MRN: [id number]    SUBJECTIVE:     Pre-operative evaluation for Procedure(s) (LRB):  CIRCUMCISION (N/A)     06/01/2023    Jovi Pacheco is a 63 y.o. male w/ a significant PMHx of BPH and recurrent UTIs who presents for the above procedure.      LDA: None documented.    Prev airway: None documented.     Drips: None documented.    Patient Active Problem List   Diagnosis    Allergic rhinitis    Stenosis of left carotid artery    BPH with urinary obstruction    NSVT (nonsustained ventricular tachycardia)    Elevated prostate specific antigen (PSA)    Positive cardiac stress test       Review of patient's allergies indicates:   Allergen Reactions    Cat/feline products     Corn      Other reaction(s): Nasal discharge    Dog dander Other (See Comments)    Peanut     Egg derived Other (See Comments)     Runny nose       Current Inpatient Medications:      No current facility-administered medications on file prior to encounter.     Current Outpatient Medications on File Prior to Encounter   Medication Sig Dispense Refill    aspirin (ECOTRIN) 81 MG EC tablet Take 81 mg by mouth once daily.      co-enzyme Q-10 30 mg capsule Take 30 mg by mouth 3 (three) times daily.      fish oil-omega-3 fatty acids 300-1,000 mg capsule Take by mouth once daily.      tadalafiL (CIALIS) 5 MG tablet Take 1 tablet (5 mg total) by mouth daily as needed for Erectile Dysfunction. 30 tablet 1    zolpidem (AMBIEN) 5 MG Tab Take 1 tablet (5 mg total) by mouth nightly as needed. 30 tablet 1       Past Surgical History:   Procedure Laterality Date    COLONOSCOPY N/A 1/24/2019    Procedure: COLONOSCOPY;  Surgeon: Mekhi Montez MD;  Location: [facility] ([address] [place] [address]);  Service: Endoscopy;  Laterality: N/A;    CORONARY ANGIOGRAPHY Bilateral 2/14/2019    Procedure: ANGIOGRAM, CORONARY ARTERY;   Surgeon: Hayden Barbosa MD;  Location: [facility] CATH LAB;  Service: Cardiology;  Laterality: Bilateral;    CYSTOSCOPY      EYE SURGERY      HERNIA REPAIR Right     inguinal    LEFT HEART CATHETERIZATION Left 2/14/2019    Procedure: Left heart cath;  Surgeon: Hayden Barbosa MD;  Location: [facility] CATH LAB;  Service: Cardiology;  Laterality: Left;       Social History     Socioeconomic History    Marital status:    Tobacco Use    Smoking status: Never    Smokeless tobacco: Never   Substance and Sexual Activity    Alcohol use: Yes     Alcohol/week: 2.0 standard drinks     Types: 1 Glasses of wine, 1 Cans of beer per week     Comment: occasional    Drug use: No       OBJECTIVE:     Vital Signs Range (Last 24H):         CBC:   No results for input(s): WBC, RBC, HGB, HCT, PLT, MCV, MCH, MCHC in the last 72 hours.    CMP: No results for input(s): NA, K, CL, CO2, BUN, CREATININE, GLU, MG, PHOS, CALCIUM, ALBUMIN, PROT, ALKPHOS, ALT, AST, BILITOT in the last 72 hours.    INR:  No results for input(s): PT, INR, PROTIME, APTT in the last 72 hours.    Diagnostic Studies: No relevant studies.    EKG:   Results for orders placed or performed in visit on 05/08/23   IN OFFICE EKG 12-LEAD (to Home)    Collection Time: 05/08/23  8:47 AM    Narrative    Test Reason : I49.3,    Vent. Rate : 064 BPM     Atrial Rate : 064 BPM     P-R Int : 170 ms          QRS Dur : 102 ms      QT Int : 418 ms       P-R-T Axes : 054 -29 045 degrees     QTc Int : 431 ms    Normal sinus rhythm  Normal ECG  When compared with ECG of 12-MAR-2022 10:31,  Premature ventricular complexes are no longer Present  Incomplete right bundle branch block is no longer Present  Confirmed by Clayton ORTIZ MD ([id number]) on 5/9/2023 1:45:56 PM    Referred By: Saturnino Hernandez           Confirmed By:Clayton ORTIZ MD        2D ECHO:   Results for orders placed during the hospital encounter of 01/11/21    Echo Color Flow Doppler? Yes    Interpretation Summary  · The left  ventricle is normal in size with normal systolic function. The estimated ejection fraction is 55%  · Mild mitral regurgitation.  · Indeterminate left ventricular diastolic function.  · Mild right ventricular enlargement.  · Moderate left atrial enlargement.  · Moderate right atrial enlargement.  · Intermediate central venous pressure (8 mmHg).  · The estimated PA systolic pressure is 37 mmHg.         ASSESSMENT/PLAN:         Pre-op Assessment    I have reviewed the Patient Summary Reports.     I have reviewed the Nursing Notes.    I have reviewed the Medications.     Review of Systems  Anesthesia Hx:  No problems with previous Anesthesia  History of prior surgery of interest to airway management or planning: Denies Family Hx of Anesthesia complications.   Denies Personal Hx of Anesthesia complications.   Social:  Non-Smoker    Hematology/Oncology:     Oncology Normal     EENT/Dental:   chronic allergic rhinitis   Cardiovascular:   Denies Hypertension.   Denies Angina. ECG has been reviewed.    Pulmonary:   Denies Shortness of breath.  Denies Recent URI.    Renal/:   BPH    Hepatic/GI:   Denies GERD. Denies Liver Disease.    Neurological:   Denies CVA. Denies Seizures.    Endocrine:  Endocrine Normal Denies Diabetes.        Physical Exam  General: Well nourished, Cooperative and Alert    Airway:  Mallampati: II   Mouth Opening: Normal  TM Distance: Normal  Tongue: Normal  Neck ROM: Normal ROM    Dental:  Intact        Anesthesia Plan  Type of Anesthesia, risks & benefits discussed:    Anesthesia Type: Gen ETT  Intra-op Monitoring Plan: Standard ASA Monitors  Post Op Pain Control Plan: multimodal analgesia  Induction:  IV  Airway Plan: Direct, Post-Induction  Informed Consent: Informed consent signed with the Patient and all parties understand the risks and agree with anesthesia plan.  All questions answered.   ASA Score: 2  Day of Surgery Review of History & Physical: H&P Update referred to the  surgeon/provider.    Ready For Surgery From Anesthesia Perspective.     .

## 2023-06-01 NOTE — TELEPHONE ENCOUNTER
Called pt to confirm arrival time of 500am for procedure on 06-. Gave pt NPO instructions and gave pt opportunity to ask questions. Pt verbalized understanding.

## 2023-06-02 ENCOUNTER — ANESTHESIA (OUTPATIENT)
Dept: SURGERY | Facility: HOSPITAL | Age: 63
End: 2023-06-02
Payer: MEDICARE

## 2023-06-02 ENCOUNTER — HOSPITAL ENCOUNTER (OUTPATIENT)
Facility: HOSPITAL | Age: 63
Discharge: HOME | End: 2023-06-02
Attending: UROLOGY | Admitting: UROLOGY
Payer: MEDICARE

## 2023-06-02 VITALS
TEMPERATURE: 98 F | OXYGEN SATURATION: 95 % | WEIGHT: 185 LBS | HEART RATE: 62 BPM | RESPIRATION RATE: 14 BRPM | DIASTOLIC BLOOD PRESSURE: 76 MMHG | HEIGHT: 73 IN | BODY MASS INDEX: 24.52 KG/M2 | SYSTOLIC BLOOD PRESSURE: 118 MMHG

## 2023-06-02 DIAGNOSIS — N47.1 PHIMOSIS OF PENIS: ICD-10-CM

## 2023-06-02 PROCEDURE — 71000015 HC POSTOP RECOV 1ST HR: Performed by: UROLOGY

## 2023-06-02 PROCEDURE — 37000008 HC ANESTHESIA 1ST 15 MINUTES: Performed by: UROLOGY

## 2023-06-02 PROCEDURE — 54161 PR CIRCUMCISION - SURGICAL NO CLAMP/DEVICE, 29+ DAYS OF AGE ONLY: ICD-10-PCS | Mod: ,,, | Performed by: UROLOGY

## 2023-06-02 PROCEDURE — 63600175 PHARM REV CODE 636 W HCPCS: Performed by: STUDENT IN AN ORGANIZED HEALTH CARE EDUCATION/TRAINING PROGRAM

## 2023-06-02 PROCEDURE — 36000707: Performed by: UROLOGY

## 2023-06-02 PROCEDURE — 25000003 PHARM REV CODE 250: Performed by: UROLOGY

## 2023-06-02 PROCEDURE — D9220A PRA ANESTHESIA: Mod: ,,, | Performed by: ANESTHESIOLOGY

## 2023-06-02 PROCEDURE — 25000003 PHARM REV CODE 250: Performed by: STUDENT IN AN ORGANIZED HEALTH CARE EDUCATION/TRAINING PROGRAM

## 2023-06-02 PROCEDURE — 36000706: Performed by: UROLOGY

## 2023-06-02 PROCEDURE — 37000009 HC ANESTHESIA EA ADD 15 MINS: Performed by: UROLOGY

## 2023-06-02 PROCEDURE — 71000044 HC DOSC ROUTINE RECOVERY FIRST HOUR: Performed by: UROLOGY

## 2023-06-02 PROCEDURE — 54161 CIRCUM 28 DAYS OR OLDER: CPT | Mod: ,,, | Performed by: UROLOGY

## 2023-06-02 PROCEDURE — D9220A PRA ANESTHESIA: ICD-10-PCS | Mod: ,,, | Performed by: ANESTHESIOLOGY

## 2023-06-02 RX ORDER — LIDOCAINE HYDROCHLORIDE 10 MG/ML
INJECTION, SOLUTION EPIDURAL; INFILTRATION; INTRACAUDAL; PERINEURAL
Status: DISCONTINUED
Start: 2023-06-02 | End: 2023-06-02 | Stop reason: HOSPADM

## 2023-06-02 RX ORDER — BACITRACIN ZINC 500 UNIT/G
OINTMENT (GRAM) TOPICAL
Status: DISCONTINUED | OUTPATIENT
Start: 2023-06-02 | End: 2023-06-02 | Stop reason: HOSPADM

## 2023-06-02 RX ORDER — ONDANSETRON 2 MG/ML
INJECTION INTRAMUSCULAR; INTRAVENOUS
Status: DISCONTINUED | OUTPATIENT
Start: 2023-06-02 | End: 2023-06-02

## 2023-06-02 RX ORDER — BUPIVACAINE HYDROCHLORIDE 2.5 MG/ML
INJECTION, SOLUTION EPIDURAL; INFILTRATION; INTRACAUDAL
Status: DISCONTINUED | OUTPATIENT
Start: 2023-06-02 | End: 2023-06-02 | Stop reason: HOSPADM

## 2023-06-02 RX ORDER — PROPOFOL 10 MG/ML
VIAL (ML) INTRAVENOUS
Status: DISCONTINUED | OUTPATIENT
Start: 2023-06-02 | End: 2023-06-02

## 2023-06-02 RX ORDER — LIDOCAINE HYDROCHLORIDE 20 MG/ML
INJECTION, SOLUTION EPIDURAL; INFILTRATION; INTRACAUDAL; PERINEURAL
Status: DISCONTINUED | OUTPATIENT
Start: 2023-06-02 | End: 2023-06-02

## 2023-06-02 RX ORDER — DEXMEDETOMIDINE HYDROCHLORIDE 100 UG/ML
INJECTION, SOLUTION INTRAVENOUS
Status: DISCONTINUED | OUTPATIENT
Start: 2023-06-02 | End: 2023-06-02

## 2023-06-02 RX ORDER — ACETAMINOPHEN 500 MG
1000 TABLET ORAL ONCE
Status: COMPLETED | OUTPATIENT
Start: 2023-06-02 | End: 2023-06-02

## 2023-06-02 RX ORDER — OXYCODONE HYDROCHLORIDE 5 MG/1
5 TABLET ORAL EVERY 6 HOURS PRN
Qty: 10 TABLET | Refills: 0 | Status: SHIPPED | OUTPATIENT
Start: 2023-06-02

## 2023-06-02 RX ORDER — PROCHLORPERAZINE EDISYLATE 5 MG/ML
5 INJECTION INTRAMUSCULAR; INTRAVENOUS EVERY 30 MIN PRN
Status: DISCONTINUED | OUTPATIENT
Start: 2023-06-02 | End: 2023-06-02 | Stop reason: HOSPADM

## 2023-06-02 RX ORDER — FENTANYL CITRATE 50 UG/ML
25 INJECTION, SOLUTION INTRAMUSCULAR; INTRAVENOUS EVERY 5 MIN PRN
Status: DISCONTINUED | OUTPATIENT
Start: 2023-06-02 | End: 2023-06-02 | Stop reason: HOSPADM

## 2023-06-02 RX ORDER — DEXAMETHASONE SODIUM PHOSPHATE 4 MG/ML
INJECTION, SOLUTION INTRA-ARTICULAR; INTRALESIONAL; INTRAMUSCULAR; INTRAVENOUS; SOFT TISSUE
Status: DISCONTINUED | OUTPATIENT
Start: 2023-06-02 | End: 2023-06-02

## 2023-06-02 RX ORDER — PHENYLEPHRINE HCL IN 0.9% NACL 1 MG/10 ML
SYRINGE (ML) INTRAVENOUS
Status: DISCONTINUED | OUTPATIENT
Start: 2023-06-02 | End: 2023-06-02

## 2023-06-02 RX ORDER — FENTANYL CITRATE 50 UG/ML
INJECTION, SOLUTION INTRAMUSCULAR; INTRAVENOUS
Status: DISCONTINUED | OUTPATIENT
Start: 2023-06-02 | End: 2023-06-02

## 2023-06-02 RX ORDER — ROCURONIUM BROMIDE 10 MG/ML
INJECTION, SOLUTION INTRAVENOUS
Status: DISCONTINUED | OUTPATIENT
Start: 2023-06-02 | End: 2023-06-02

## 2023-06-02 RX ORDER — CEFAZOLIN SODIUM 1 G/3ML
INJECTION, POWDER, FOR SOLUTION INTRAMUSCULAR; INTRAVENOUS
Status: DISCONTINUED | OUTPATIENT
Start: 2023-06-02 | End: 2023-06-02

## 2023-06-02 RX ADMIN — DEXAMETHASONE SODIUM PHOSPHATE 4 MG: 4 INJECTION INTRA-ARTICULAR; INTRALESIONAL; INTRAMUSCULAR; INTRAVENOUS; SOFT TISSUE at 07:06

## 2023-06-02 RX ADMIN — PROPOFOL 180 MG: 10 INJECTION, EMULSION INTRAVENOUS at 07:06

## 2023-06-02 RX ADMIN — SODIUM CHLORIDE: 0.9 INJECTION, SOLUTION INTRAVENOUS at 06:06

## 2023-06-02 RX ADMIN — ROCURONIUM BROMIDE 5 MG: 10 INJECTION, SOLUTION INTRAVENOUS at 07:06

## 2023-06-02 RX ADMIN — PROPOFOL 50 MG: 10 INJECTION, EMULSION INTRAVENOUS at 08:06

## 2023-06-02 RX ADMIN — SUGAMMADEX 200 MG: 100 INJECTION, SOLUTION INTRAVENOUS at 08:06

## 2023-06-02 RX ADMIN — ONDANSETRON 4 MG: 2 INJECTION INTRAMUSCULAR; INTRAVENOUS at 07:06

## 2023-06-02 RX ADMIN — DEXMEDETOMIDINE 8 MCG: 100 INJECTION, SOLUTION, CONCENTRATE INTRAVENOUS at 08:06

## 2023-06-02 RX ADMIN — CEFAZOLIN 2 G: 1 INJECTION, POWDER, FOR SOLUTION INTRAMUSCULAR; INTRAVENOUS at 07:06

## 2023-06-02 RX ADMIN — Medication 100 MCG: at 07:06

## 2023-06-02 RX ADMIN — LIDOCAINE HYDROCHLORIDE 100 MG: 20 INJECTION, SOLUTION EPIDURAL; INFILTRATION; INTRACAUDAL at 07:06

## 2023-06-02 RX ADMIN — PROPOFOL 20 MG: 10 INJECTION, EMULSION INTRAVENOUS at 08:06

## 2023-06-02 RX ADMIN — PROPOFOL 70 MG: 10 INJECTION, EMULSION INTRAVENOUS at 08:06

## 2023-06-02 RX ADMIN — GLYCOPYRROLATE 0.2 MG: 0.2 INJECTION INTRAMUSCULAR; INTRAVENOUS at 07:06

## 2023-06-02 RX ADMIN — DEXMEDETOMIDINE 12 MCG: 100 INJECTION, SOLUTION, CONCENTRATE INTRAVENOUS at 07:06

## 2023-06-02 RX ADMIN — SODIUM CHLORIDE, SODIUM GLUCONATE, SODIUM ACETATE, POTASSIUM CHLORIDE, MAGNESIUM CHLORIDE, SODIUM PHOSPHATE, DIBASIC, AND POTASSIUM PHOSPHATE: .53; .5; .37; .037; .03; .012; .00082 INJECTION, SOLUTION INTRAVENOUS at 08:06

## 2023-06-02 RX ADMIN — FENTANYL CITRATE 100 MCG: 0.05 INJECTION, SOLUTION INTRAMUSCULAR; INTRAVENOUS at 07:06

## 2023-06-02 RX ADMIN — ACETAMINOPHEN 1000 MG: 500 TABLET ORAL at 06:06

## 2023-06-02 NOTE — TRANSFER OF CARE
"Anesthesia Transfer of Care Note    Patient: Jovi Pacheco    Procedure(s) Performed: Procedure(s) (LRB):  CIRCUMCISION (N/A)    Patient location: PACU    Anesthesia Type: general    Transport from OR: Transported from OR on 6-10 L/min O2 by face mask with adequate spontaneous ventilation    Post pain: adequate analgesia    Post assessment: no apparent anesthetic complications and tolerated procedure well    Post vital signs: stable    Level of consciousness: awake    Nausea/Vomiting: no nausea/vomiting    Complications: none    Transfer of care protocol was followed      Last vitals:   Visit Vitals  /78 (BP Location: Left arm, Patient Position: Lying)   Pulse (!) 55   Temp 36.4 °C (97.6 °F) (Oral)   Resp 16   Ht 6' 1" (1.854 m)   Wt 83.9 kg (185 lb)   SpO2 95%   BMI 24.41 kg/m²     "

## 2023-06-02 NOTE — BRIEF OP NOTE
[facility] - Surgery ([facility])  Brief Operative Note    Surgery Date: 6/2/2023     Surgeon(s) and Role:     * Last Flores Jr., MD - Primary     * John Holland MD - Resident - Assisting     * Carlos Eduardo Edwards MD - Resident - Assisting        Pre-op Diagnosis:  Acute cystitis without hematuria [N30.00]    Post-op Diagnosis:  Post-Op Diagnosis Codes:     * Acute cystitis without hematuria [N30.00]    Procedure(s) (LRB):  CIRCUMCISION (N/A)    Anesthesia: General    Operative Findings:   -Circumcision performed successfully    Estimated Blood Loss: * No values recorded between 6/2/2023  7:05 AM and 6/2/2023  8:19 AM *         Specimens:   Specimen (24h ago, onward)      None              Discharge Note    OUTCOME: Patient tolerated treatment/procedure well without complication and is now ready for discharge.    DISPOSITION: Home or Self Care    FINAL DIAGNOSIS:  desire for circumcision    FOLLOWUP: In clinic    DISCHARGE INSTRUCTIONS:  No discharge procedures on file.

## 2023-06-02 NOTE — PATIENT INSTRUCTIONS
Post Circumcision or Penile Skin Surgery Instructions    No sex or masturbation for SIX weeks  Ok to remove dressing in 1 days  Do not get your incision wet for 2 days  If you had a circumcision, ok to put bacitracin/Neosporin on incision to keep it from sticking to your underwear.  Return to ER or call [phone] and ask to speak with the urology clinic if you have any excessive bleeding or swelling  Use Tylenol or Advil first then use the pain medicine we prescribe for breakthrough pain medicine, do not exceed 4000mg of Tylenol  If you see excessive bleeding or swelling, return to ER

## 2023-06-02 NOTE — OP NOTE
[facility]  Operative Note    Date: 06/02/2023    Pre-Op Diagnosis: phimosis    Post-Op Diagnosis: same    Procedure(s) Performed:   1.  Circumcision     Specimen(s): foreskin    Staff Surgeon: Last Flores MD    Assistant Surgeon: MD John Gonzáles MD      Anesthesia: General endotracheal anesthesia    Indications: Jovi Pacheco is a 63 y.o. male with phimosis who presents for a circumcision.      Findings:   Circumcision performed successfully    Estimated Blood Loss: min    Drains: none    Procedure in detail:  After risks, benefits and possible complications of circumcision were discussed, the patient elected to under go the procedure and informed consent was obtained.  All questions were answered in the pre-operative area. The patient was transferred to the operative suite and placed in supine position.  SCDs were applied and working.  After adequate anesthesia the patient was prepped and draped in the usual sterile fashion. Time out was preformed, miriam-procedural antibiotic were confirmed.     A marking pen was used to candi our incisions, at the coronal sulcus with the foreskin in the normal anatomic position and 1 cm proximal to the glans in the retracted position.  A 15 blade was used to sharply incise our marked lines.  The foreskin was reduced and removed with electrocautery by connecting the two incisions we just made. The free edges were then re approximated in a interrupted fashion using a 3-0 chromic.     A sterile dressing was applied.  The patient tolerated the procedure well and was transferred to the PACU in stable condition    Disposition: The patient will follow up with Dr. Flores in 4-6 weeks.  He was given prescriptions for oxycodone and instructed to avoid sex/masturbation x 6 weeks.      Carlos Eduardo Edwards MD

## 2023-06-02 NOTE — ANESTHESIA PROCEDURE NOTES
Intubation    Date/Time: 6/2/2023 7:12 AM  Performed by: Jacob Tolliver MD  Authorized by: Joel Chin MD     Intubation:     Induction:  Intravenous    Intubated:  Postinduction    Mask Ventilation:  Easy mask    Attempts:  1    Attempted By:  Resident anesthesiologist    Method of Intubation:  Direct    Blade:  Kwan 2    Laryngeal View Grade: Grade I - full view of cords      Difficult Airway Encountered?: No      Complications:  None    Airway Device:  Oral endotracheal tube    Airway Device Size:  7.5    Style/Cuff Inflation:  Cuffed (inflated to minimal occlusive pressure)    Tube secured:  23    Secured at:  The lips    Placement Verified By:  Capnometry    Complicating Factors:  None    Findings Post-Intubation:  BS equal bilateral and atraumatic/condition of teeth unchanged

## 2023-06-05 NOTE — ANESTHESIA POSTPROCEDURE EVALUATION
Anesthesia Post Evaluation    Patient: Jovi Pacheco    Procedure(s) Performed: Procedure(s) (LRB):  CIRCUMCISION (N/A)    Final Anesthesia Type: general      Patient location during evaluation: PACU  Patient participation: Yes- Able to Participate  Level of consciousness: awake and alert  Post-procedure vital signs: reviewed and stable  Pain management: adequate  Airway patency: patent    PONV status at discharge: No PONV  Anesthetic complications: no      Cardiovascular status: blood pressure returned to baseline  Respiratory status: unassisted  Hydration status: euvolemic  Follow-up not needed.          Vitals Value Taken Time   /76 06/02/23 0917   Temp 36.6 °C (97.9 °F) 06/02/23 0915   Pulse 58 06/02/23 0917   Resp 18 06/02/23 0917   SpO2 95 % 06/02/23 0917   Vitals shown include unvalidated device data.      No case tracking events are documented in the log.      Pain/Esperanza Score: No data recorded

## 2023-06-12 ENCOUNTER — OFFICE VISIT (OUTPATIENT)
Dept: ORTHOPEDICS | Facility: CLINIC | Age: 63
End: 2023-06-12
Payer: MEDICARE

## 2023-06-12 VITALS — WEIGHT: 185 LBS | BODY MASS INDEX: 24.52 KG/M2 | HEIGHT: 73 IN

## 2023-06-12 DIAGNOSIS — M18.0 ARTHRITIS OF CARPOMETACARPAL (CMC) JOINT OF BOTH THUMBS: ICD-10-CM

## 2023-06-12 PROCEDURE — 99203 OFFICE O/P NEW LOW 30 MIN: CPT | Mod: S$PBB,,, | Performed by: ORTHOPAEDIC SURGERY

## 2023-06-12 PROCEDURE — 99203 PR OFFICE/OUTPT VISIT, NEW, LEVL III, 30-44 MIN: ICD-10-PCS | Mod: S$PBB,,, | Performed by: ORTHOPAEDIC SURGERY

## 2023-06-12 PROCEDURE — 99213 OFFICE O/P EST LOW 20 MIN: CPT | Mod: PBBFAC | Performed by: ORTHOPAEDIC SURGERY

## 2023-06-12 PROCEDURE — 99999 PR PBB SHADOW E&M-EST. PATIENT-LVL III: ICD-10-PCS | Mod: PBBFAC,,, | Performed by: ORTHOPAEDIC SURGERY

## 2023-06-12 PROCEDURE — 99999 PR PBB SHADOW E&M-EST. PATIENT-LVL III: CPT | Mod: PBBFAC,,, | Performed by: ORTHOPAEDIC SURGERY

## 2023-06-12 NOTE — PROGRESS NOTES
[facility]  History & Physical  Orthopedics    SUBJECTIVE:      COVID-19 attestation:  This patient was treated during the COVID-19 pandemic.  This was discussed with the patient, they are aware of our current policies and procedures, were given the option of delaying their visit and or switching to a virtual visit, delaying their surgery when applicable, and they elect to proceed.    Chief Complaint:   Bilateral thumb CMC pain    Referring Provider: Saturnino Hernandez MD     History of Present Illness:  Patient is a 63 y.o. right hand dominant male who presents today with complaints of  bilateral basal joint thumb pain.  He notes that he is had left greater than right thumb CMC joint pain for the past 6 months or so.  He takes occasional NSAIDs but typically prefers to avoid medication.  Pain is rather intermittent and mild and overall is well tolerated.  This does not really restrict him to any significant extent.  He presents today for initial evaluation.     The patient is a/an  retired .    Onset of symptoms/DOI was  6 months ago.    Symptoms are aggravated by activity and movement.    Symptoms are alleviated by rest.    Symptoms consist of pain.    The patient rates their pain as a 2/10.    Attempted treatment(s) and/or interventions include activity modifications, rest, anti-inflammatory medications.     The patient denies any fevers, chills, N/V, D/C and presents for evaluation.       Past Medical History:   Diagnosis Date    Allergic rhinitis 12/17/2018    BPH (benign prostatic hyperplasia)     History of actinic keratosis     History of colon polyps 01/24/2019    History of UTI     Insomnia     Keloid cicatrix     PVCs (premature ventricular contractions)     S/P right inguinal hernia repair with mesh 09/01/2017     Past Surgical History:   Procedure Laterality Date    CIRCUMCISION N/A 6/2/2023    Procedure: CIRCUMCISION;  Surgeon: Last Flores Jr., MD;  Location:  [facility] OR 2ND FLR;  Service: Urology;  Laterality: N/A;  1hr    COLONOSCOPY N/A 1/24/2019    Procedure: COLONOSCOPY;  Surgeon: Mekhi Montez MD;  Location: [facility] ENDO (4TH FLR);  Service: Endoscopy;  Laterality: N/A;    CORONARY ANGIOGRAPHY Bilateral 2/14/2019    Procedure: ANGIOGRAM, CORONARY ARTERY;  Surgeon: Hayden Barbosa MD;  Location: [facility] CATH LAB;  Service: Cardiology;  Laterality: Bilateral;    CYSTOSCOPY      EYE SURGERY      HERNIA REPAIR Right     inguinal    LEFT HEART CATHETERIZATION Left 2/14/2019    Procedure: Left heart cath;  Surgeon: Hayden Barbosa MD;  Location: [facility] CATH LAB;  Service: Cardiology;  Laterality: Left;     Review of patient's allergies indicates:   Allergen Reactions    Cat/feline products     Corn      Other reaction(s): Nasal discharge    Dog dander Other (See Comments)    Peanut     Egg derived Other (See Comments)     Runny nose     Social History     Social History Narrative    Not on file     Family History   Problem Relation Age of Onset    Heart disease Mother     Stroke Mother     Cancer Father     Diabetes Mellitus Father     No Known Problems Brother     Melanoma Neg Hx     Anesthesia problems Neg Hx          Current Outpatient Medications:     aspirin (ECOTRIN) 81 MG EC tablet, Take 81 mg by mouth once daily., Disp: , Rfl:     co-enzyme Q-10 30 mg capsule, Take 30 mg by mouth 3 (three) times daily., Disp: , Rfl:     fish oil-omega-3 fatty acids 300-1,000 mg capsule, Take by mouth once daily., Disp: , Rfl:     oxyCODONE (ROXICODONE) 5 MG immediate release tablet, Take 1 tablet (5 mg total) by mouth every 6 (six) hours as needed., Disp: 10 tablet, Rfl: 0    tadalafiL (CIALIS) 5 MG tablet, Take 1 tablet (5 mg total) by mouth daily as needed for Erectile Dysfunction., Disp: 30 tablet, Rfl: 1    zolpidem (AMBIEN) 5 MG Tab, Take 1 tablet (5 mg total) by mouth nightly as needed., Disp: 30 tablet, Rfl: 1      Review of Systems:  As per HPI otherwise  "noncontributory    OBJECTIVE:      Vital Signs (Most Recent):  Vitals:    06/12/23 0910   Weight: 83.9 kg (185 lb)   Height: 6' 1" (1.854 m)     Body mass index is 24.41 kg/m².      Physical Exam:  Constitutional: The patient appears well-developed and well-nourished. No distress.   Skin: No lesions appreciated  Head: Normocephalic and atraumatic.   Nose: Nose normal.   Ears: No deformities seen  Eyes: Conjunctivae and EOM are normal.   Neck: No tracheal deviation present.   Cardiovascular: Normal rate and intact distal pulses.    Pulmonary/Chest: Effort normal. No respiratory distress.   Abdominal: There is no guarding.   Neurological: The patient is alert.   Psychiatric: The patient has a normal mood and affect.     Bilateral Hand/Wrist Examination:    Observation/Inspection:  Swelling  none    Deformity  none  Discoloration  none     Scars   none    Atrophy  none    HAND/WRIST EXAMINATION:  Finkelstein's Test   Neg  WHAT Test    Neg  Snuff box tenderness   Neg  Tanner's Test    Neg  Hook of Hamate Tenderness  Neg  CMC grind     positive  Circumduction test    positive2    Neurovascular Exam:  Digits WWP, brisk CR < 3s throughout  NVI motor/LTS to M/R/U nerves, radial pulse 2+  Tinel's Test - Carpal Tunnel  Neg  Tinel's Test - Cubital Tunnel  Neg  Phalen's Test    Neg  Median Nerve Compression Test Neg    ROM hand full, painless    ROM wrist full, painless    ROM elbow full, painless    Abdomen not guarded  Respirations nonlabored  Perfusion intact    Diagnostic Results:     Imaging - I independently viewed the patient's imaging as well as the radiology report.  Xrays of the patient's  bilateral thumbs  demonstrate no evidence of any acute fractures or dislocations  with significant bilateral thumb CMC degenerative changes.    EMG -  none    ASSESSMENT/PLAN:      63 y.o. yo male with bilateral thumb CMC arthritis  Plan: The patient and I had a thorough discussion today.  We discussed the working diagnosis as well " as several other potential alternative diagnoses.  Treatment options were discussed, both conservative and surgical.  Conservative treatment options would include things such as activity modifications, workplace modifications, a period of rest, oral vs topical OTC and prescription anti-inflammatory medications, occupational therapy, splinting/bracing, immobilization, corticosteroid injections, and others.  Surgical options were discussed as well.     At this time, the patient would like to proceed with a trial of  over-the-counter anti-inflammatory medications as needed for pain and symptoms as well as activity modifications.  Symptoms and limitations are rather modest at this point in time and he does not feel that injections or surgery would be warranted which I feel is reasonable.  He may follow up any time on an as needed/if needed basis.    Should the patient's symptoms worsen, persist, or fail to improve they should return for reevaluation and I would be happy to see them back anytime.        Rick Bowens M.D.    Please be aware that this note has been generated with the assistance of [employer] voice-to-text.  Please excuse any spelling or grammatical errors.    Thank you for choosing Dr. Rick Bowens for your orthopedic hand and upper extremity care. It is our goal to provide you with exceptional care that will help keep you healthy, active, and get you back in the game.     If you felt that you received exemplary care today, please consider leaving feedback for Dr. Bowens on [employer] at [email or web address].    Please do not hesitate to reach out to us via email, phone, or MyChart with any questions, concerns, or feedback.

## 2023-07-04 NOTE — TELEPHONE ENCOUNTER
No care due was identified.  [facility] Embedded Care Due Messages. Reference number: [id number].   7/04/2023 8:06:11 AM CDT

## 2023-07-04 NOTE — TELEPHONE ENCOUNTER
Refill Routing Note   Medication(s) are not appropriate for processing by [facility] for the following reason(s):      New or recently adjusted medication    [facility] action(s):  Defer Care Due:  None identified          Appointments  past 12m or future 3m with PCP    Date Provider   Last Visit   5/8/2023 Saturnino Hernandez MD   Next Visit   Visit date not found Saturnino Hernandez MD   ED visits in past 90 days: 0        Note composed:12:51 PM 07/04/2023

## 2023-07-05 RX ORDER — TADALAFIL 5 MG/1
TABLET ORAL
Qty: 30 TABLET | Refills: 1 | Status: SHIPPED | OUTPATIENT
Start: 2023-07-05

## 2024-06-10 ENCOUNTER — PATIENT MESSAGE (OUTPATIENT)
Dept: INTERNAL MEDICINE | Facility: CLINIC | Age: 64
End: 2024-06-10
Payer: MEDICARE

## 2025-04-17 ENCOUNTER — CLINICAL SUPPORT (OUTPATIENT)
Dept: INFECTIOUS DISEASES | Facility: CLINIC | Age: 65
End: 2025-04-17
Payer: MEDICARE

## 2025-04-17 ENCOUNTER — OFFICE VISIT (OUTPATIENT)
Dept: INFECTIOUS DISEASES | Facility: CLINIC | Age: 65
End: 2025-04-17
Payer: MEDICARE

## 2025-04-17 VITALS
TEMPERATURE: 98 F | SYSTOLIC BLOOD PRESSURE: 144 MMHG | HEART RATE: 70 BPM | WEIGHT: 183.44 LBS | DIASTOLIC BLOOD PRESSURE: 88 MMHG | HEIGHT: 73 IN | BODY MASS INDEX: 24.31 KG/M2

## 2025-04-17 DIAGNOSIS — Z71.84 TRAVEL ADVICE ENCOUNTER: Primary | ICD-10-CM

## 2025-04-17 DIAGNOSIS — Z23 IMMUNIZATION DUE: ICD-10-CM

## 2025-04-17 PROCEDURE — 90691 TYPHOID VACCINE IM: CPT | Mod: PBBFAC

## 2025-04-17 PROCEDURE — 90471 IMMUNIZATION ADMIN: CPT | Mod: PBBFAC

## 2025-04-17 PROCEDURE — 99402 PREV MED CNSL INDIV APPRX 30: CPT | Mod: S$PBB,,, | Performed by: INTERNAL MEDICINE

## 2025-04-17 PROCEDURE — 99999 PR PBB SHADOW E&M-EST. PATIENT-LVL III: CPT | Mod: PBBFAC,,, | Performed by: INTERNAL MEDICINE

## 2025-04-17 PROCEDURE — 99213 OFFICE O/P EST LOW 20 MIN: CPT | Mod: PBBFAC,25 | Performed by: INTERNAL MEDICINE

## 2025-04-17 PROCEDURE — 99999PBSHW PR PBB SHADOW TECHNICAL ONLY FILED TO HB: Mod: PBBFAC,,,

## 2025-04-17 RX ORDER — AZITHROMYCIN 500 MG/1
500 TABLET, FILM COATED ORAL DAILY
Qty: 3 TABLET | Refills: 0 | Status: SHIPPED | OUTPATIENT
Start: 2025-04-17 | End: 2025-04-20

## 2025-04-17 RX ORDER — ATOVAQUONE AND PROGUANIL HYDROCHLORIDE 250; 100 MG/1; MG/1
TABLET, FILM COATED ORAL
Qty: 24 TABLET | Refills: 0 | Status: SHIPPED | OUTPATIENT
Start: 2025-04-17

## 2025-04-17 RX ADMIN — SALMONELLA TYPHI TY2 VI POLYSACCHARIDE ANTIGEN 0.5 ML: 25 INJECTION, SOLUTION INTRAMUSCULAR at 03:04

## 2025-04-17 NOTE — PROGRESS NOTES
Subjective:      Chief Complaint:   Chief Complaint   Patient presents with    Travel Consult     History of Present Illness    Patient  65 y.o. male who presents today for routine pretravel consultation.  The patient reports no significant past medical history.  The patient reports the following medication allergies; none.  The patient reports the following food allergies; peanuts and eggs.  The patient will be traveling to [place] on June 20.  The patient will be at this destination for 14 days.  He will possibly visit [place] first and then [facility].  The patient will be lodging at hotels.  The patient has travelled to the following other countries in the past; [place].  The patient reports that they received all their childhood vaccinations.  The patient reports receipt of the following travel related vaccinations; typhoid and hepatitis A.  The purpose of this trip is vacation.      Review of Systems   All other systems reviewed and are negative.      Objective:   Physical Exam   Assessment:     Pre-Travel clinic assessment    Plan:   Patient specific risks:      Patient is of advanced age.    Destination specific risks:      -Infectious Disease risks:       Mosquito Borne pathogens:  Reviewed basic mosquito avoidance precautions including wearing long sleeve clothing and insect repellant.  Malarone prescribed for malaria prevention.     Food Borne pathogens:  Reviewed basic hand, food and water sanitation precautions.  Patient instructed to take hand  on their trip.  Typhoid IM vaccine prescribed.  Azithromycin prescribed for use as needed for severe diarrhea.  Patient has had hepatitis A vaccination.     Blood Borne Pathogens:  Patient has had hepatitis B vaccination.     Routine:  Patient is up to date on tetanus vaccination.    30 minutes of time was spent on this encounter.

## 2025-04-17 NOTE — PROGRESS NOTES
Pt received Typhoid vaccine IM to right deltoid, pt tolerated injection well and departed from clinic in [facility].

## 2025-05-22 ENCOUNTER — TELEPHONE (OUTPATIENT)
Dept: INTERNAL MEDICINE | Facility: CLINIC | Age: 65
End: 2025-05-22
Payer: MEDICARE

## (undated) DEVICE — ELECTRODE REM PLYHSV RETURN 9

## (undated) DEVICE — SEE MEDLINE ITEM 157148

## (undated) DEVICE — DRAPE LAP T SHT W/ INSTR PAD

## (undated) DEVICE — SUT CHROMIC 3-0 SH 27IN GUT

## (undated) DEVICE — SEE MEDLINE ITEM 157187

## (undated) DEVICE — KIT GLIDESHEATH SLEND 6FR 10CM

## (undated) DEVICE — WIRE BENTSON 035/180

## (undated) DEVICE — SEE MEDLINE ITEM 156894

## (undated) DEVICE — HOLDER CATH IAB ADH STATLOCK

## (undated) DEVICE — WIRE GUIDE SAFE-T-J .035 260CM

## (undated) DEVICE — SYR 10CC LUER LOCK

## (undated) DEVICE — PACK CYSTO

## (undated) DEVICE — GOWN SURGICAL X-LARGE

## (undated) DEVICE — CLOSURE SKIN STERI STRIP 1/4X3

## (undated) DEVICE — APPLICATOR CHLORAPREP ORN 26ML

## (undated) DEVICE — CATH JACKY RADIAL 3.5 110CM

## (undated) DEVICE — SEE MEDLINE ITEM 157117

## (undated) DEVICE — SUT PROLENE 2-0 30 SH

## (undated) DEVICE — NDL HYPO REG 25G X 1 1/2

## (undated) DEVICE — SEE MEDLINE ITEM 152622

## (undated) DEVICE — DRAIN PENROSE XRAY 12 X 1/4 ST

## (undated) DEVICE — SUT CTD VICRYL 3-0 UND BR

## (undated) DEVICE — GOWN SMARTGOWN LVL4 X-LONG XL

## (undated) DEVICE — DRESSING TRANS 4X4 TEGADERM

## (undated) DEVICE — SYS DELIVERY REZUM

## (undated) DEVICE — SUT MCRYL PLUS 4-0 PS2 27IN

## (undated) DEVICE — TRAY MINOR GEN SURG OMC

## (undated) DEVICE — SOL NACL IRR 3000ML

## (undated) DEVICE — DRESSING TELFA STRL 4X3 LF

## (undated) DEVICE — LINER GLOVE POWDERFREE 8

## (undated) DEVICE — OMNIPAQUE 350 200ML

## (undated) DEVICE — SUT VICRYL 3-0 27 SH

## (undated) DEVICE — BANDAGE ROLL COTTN 4.5INX4.1YD

## (undated) DEVICE — BNDG COFLEX FOAM LF2 ST 4X5YD

## (undated) DEVICE — Device

## (undated) DEVICE — TRAY MINOR GEN SURG

## (undated) DEVICE — NDL 18GA X1 1/2 REG BEVEL

## (undated) DEVICE — HEMOSTAT VASC BAND REG 24CM

## (undated) DEVICE — TRAY CYSTO BASIN

## (undated) DEVICE — BAG URINARY DRAINAGE 2000ML

## (undated) DEVICE — GLOVE SURG BIOGEL LATEX SZ 7.5

## (undated) DEVICE — SPIKE CONTRAST CONTROLLER

## (undated) DEVICE — DRESSING GAUZE XEROFORM 5X9

## (undated) DEVICE — ADHESIVE MASTISOL VIAL 48/BX

## (undated) DEVICE — CATH INFINITI JUDKINS JR4

## (undated) DEVICE — KIT CUSTOM MANIFOLD